# Patient Record
Sex: MALE | Race: ASIAN | Employment: OTHER | URBAN - METROPOLITAN AREA
[De-identification: names, ages, dates, MRNs, and addresses within clinical notes are randomized per-mention and may not be internally consistent; named-entity substitution may affect disease eponyms.]

---

## 2017-05-19 ENCOUNTER — OFFICE VISIT (OUTPATIENT)
Dept: VASCULAR SURGERY | Age: 77
End: 2017-05-19

## 2017-05-19 ENCOUNTER — PROCEDURE VISIT (OUTPATIENT)
Dept: VASCULAR SURGERY | Age: 77
End: 2017-05-19

## 2017-05-19 VITALS
WEIGHT: 172 LBS | DIASTOLIC BLOOD PRESSURE: 60 MMHG | HEIGHT: 65 IN | BODY MASS INDEX: 28.66 KG/M2 | SYSTOLIC BLOOD PRESSURE: 112 MMHG

## 2017-05-19 DIAGNOSIS — Z48.812 POSTOP CAROTID ENDARTERECTOMY SURVEILLANCE, ENCOUNTER FOR: Primary | ICD-10-CM

## 2017-05-19 DIAGNOSIS — Z48.812 ENCOUNTER FOR POSTOPERATIVE CAROTID ENDARTERECTOMY SURVEILLANCE: Primary | ICD-10-CM

## 2017-05-19 PROCEDURE — G8598 ASA/ANTIPLAT THER USED: HCPCS | Performed by: SURGERY

## 2017-05-19 PROCEDURE — 1123F ACP DISCUSS/DSCN MKR DOCD: CPT | Performed by: SURGERY

## 2017-05-19 PROCEDURE — 93880 EXTRACRANIAL BILAT STUDY: CPT | Performed by: SURGERY

## 2017-05-19 PROCEDURE — 4040F PNEUMOC VAC/ADMIN/RCVD: CPT | Performed by: SURGERY

## 2017-05-19 PROCEDURE — G8427 DOCREV CUR MEDS BY ELIG CLIN: HCPCS | Performed by: SURGERY

## 2017-05-19 PROCEDURE — 1036F TOBACCO NON-USER: CPT | Performed by: SURGERY

## 2017-05-19 PROCEDURE — G8420 CALC BMI NORM PARAMETERS: HCPCS | Performed by: SURGERY

## 2017-05-19 PROCEDURE — 99214 OFFICE O/P EST MOD 30 MIN: CPT | Performed by: SURGERY

## 2017-05-19 RX ORDER — METHOCARBAMOL 500 MG/1
500 TABLET, FILM COATED ORAL 4 TIMES DAILY
COMMUNITY
End: 2017-08-17 | Stop reason: ALTCHOICE

## 2017-05-19 RX ORDER — DARIFENACIN HYDROBROMIDE 7.5 MG/1
7.5 TABLET, EXTENDED RELEASE ORAL DAILY
COMMUNITY
End: 2017-08-24 | Stop reason: CLARIF

## 2017-05-19 RX ORDER — GLIMEPIRIDE 4 MG/1
4 TABLET ORAL
COMMUNITY
End: 2018-06-08 | Stop reason: ALTCHOICE

## 2017-08-17 ENCOUNTER — OFFICE VISIT (OUTPATIENT)
Dept: CARDIOLOGY CLINIC | Age: 77
End: 2017-08-17

## 2017-08-17 VITALS
HEART RATE: 81 BPM | WEIGHT: 173 LBS | OXYGEN SATURATION: 94 % | HEIGHT: 65 IN | DIASTOLIC BLOOD PRESSURE: 60 MMHG | BODY MASS INDEX: 28.82 KG/M2 | SYSTOLIC BLOOD PRESSURE: 130 MMHG

## 2017-08-17 DIAGNOSIS — R06.02 SOB (SHORTNESS OF BREATH): ICD-10-CM

## 2017-08-17 DIAGNOSIS — Z98.61 CAD S/P PERCUTANEOUS CORONARY ANGIOPLASTY: Primary | ICD-10-CM

## 2017-08-17 DIAGNOSIS — I25.10 CAD S/P PERCUTANEOUS CORONARY ANGIOPLASTY: Primary | ICD-10-CM

## 2017-08-17 PROCEDURE — G8598 ASA/ANTIPLAT THER USED: HCPCS | Performed by: INTERNAL MEDICINE

## 2017-08-17 PROCEDURE — 99215 OFFICE O/P EST HI 40 MIN: CPT | Performed by: INTERNAL MEDICINE

## 2017-08-17 PROCEDURE — G8427 DOCREV CUR MEDS BY ELIG CLIN: HCPCS | Performed by: INTERNAL MEDICINE

## 2017-08-17 PROCEDURE — 1036F TOBACCO NON-USER: CPT | Performed by: INTERNAL MEDICINE

## 2017-08-17 PROCEDURE — 1123F ACP DISCUSS/DSCN MKR DOCD: CPT | Performed by: INTERNAL MEDICINE

## 2017-08-17 PROCEDURE — 4040F PNEUMOC VAC/ADMIN/RCVD: CPT | Performed by: INTERNAL MEDICINE

## 2017-08-17 PROCEDURE — G8419 CALC BMI OUT NRM PARAM NOF/U: HCPCS | Performed by: INTERNAL MEDICINE

## 2017-08-17 RX ORDER — TOLTERODINE TARTRATE 2 MG/1
2 TABLET, EXTENDED RELEASE ORAL 2 TIMES DAILY
COMMUNITY

## 2017-08-24 ENCOUNTER — OFFICE VISIT (OUTPATIENT)
Dept: CARDIOLOGY CLINIC | Age: 77
End: 2017-08-24

## 2017-08-24 ENCOUNTER — HOSPITAL ENCOUNTER (OUTPATIENT)
Dept: OTHER | Age: 77
Discharge: HOME | End: 2017-08-24
Attending: INTERNAL MEDICINE | Admitting: INTERNAL MEDICINE

## 2017-08-24 VITALS
WEIGHT: 173 LBS | SYSTOLIC BLOOD PRESSURE: 132 MMHG | OXYGEN SATURATION: 95 % | DIASTOLIC BLOOD PRESSURE: 60 MMHG | HEART RATE: 80 BPM | BODY MASS INDEX: 28.82 KG/M2 | HEIGHT: 65 IN

## 2017-08-24 DIAGNOSIS — I65.23 BILATERAL CAROTID ARTERY STENOSIS: ICD-10-CM

## 2017-08-24 DIAGNOSIS — I10 ESSENTIAL HYPERTENSION: ICD-10-CM

## 2017-08-24 DIAGNOSIS — E78.5 HYPERLIPIDEMIA, UNSPECIFIED HYPERLIPIDEMIA TYPE: Primary | ICD-10-CM

## 2017-08-24 DIAGNOSIS — I50.31 CHF (CONGESTIVE HEART FAILURE), NYHA CLASS III, ACUTE, DIASTOLIC (HCC): ICD-10-CM

## 2017-08-24 DIAGNOSIS — R06.02 SOB (SHORTNESS OF BREATH): ICD-10-CM

## 2017-08-24 DIAGNOSIS — Z98.61 CAD S/P PERCUTANEOUS CORONARY ANGIOPLASTY: ICD-10-CM

## 2017-08-24 DIAGNOSIS — I25.10 CAD S/P PERCUTANEOUS CORONARY ANGIOPLASTY: ICD-10-CM

## 2017-08-24 LAB — PRO-BNP: 630 PG/ML (ref 0–449)

## 2017-08-24 PROCEDURE — G8427 DOCREV CUR MEDS BY ELIG CLIN: HCPCS | Performed by: NURSE PRACTITIONER

## 2017-08-24 PROCEDURE — G8598 ASA/ANTIPLAT THER USED: HCPCS | Performed by: NURSE PRACTITIONER

## 2017-08-24 PROCEDURE — 1123F ACP DISCUSS/DSCN MKR DOCD: CPT | Performed by: NURSE PRACTITIONER

## 2017-08-24 PROCEDURE — 4040F PNEUMOC VAC/ADMIN/RCVD: CPT | Performed by: NURSE PRACTITIONER

## 2017-08-24 PROCEDURE — 1036F TOBACCO NON-USER: CPT | Performed by: NURSE PRACTITIONER

## 2017-08-24 PROCEDURE — G8419 CALC BMI OUT NRM PARAM NOF/U: HCPCS | Performed by: NURSE PRACTITIONER

## 2017-08-24 PROCEDURE — 99214 OFFICE O/P EST MOD 30 MIN: CPT | Performed by: NURSE PRACTITIONER

## 2017-08-24 PROCEDURE — 1111F DSCHRG MED/CURRENT MED MERGE: CPT | Performed by: NURSE PRACTITIONER

## 2017-08-24 RX ORDER — LOSARTAN POTASSIUM AND HYDROCHLOROTHIAZIDE 25; 100 MG/1; MG/1
0.5 TABLET ORAL DAILY
Qty: 30 TABLET | Refills: 5 | Status: SHIPPED | OUTPATIENT
Start: 2017-08-24 | End: 2017-08-24 | Stop reason: DRUGHIGH

## 2017-08-24 RX ORDER — ATORVASTATIN CALCIUM 40 MG/1
40 TABLET, FILM COATED ORAL DAILY
Qty: 30 TABLET | Refills: 11 | Status: SHIPPED | OUTPATIENT
Start: 2017-08-24 | End: 2018-10-16 | Stop reason: SDUPTHER

## 2017-08-24 RX ORDER — ATORVASTATIN CALCIUM 40 MG/1
40 TABLET, FILM COATED ORAL DAILY
Qty: 30 TABLET | Refills: 6 | Status: SHIPPED | OUTPATIENT
Start: 2017-08-24 | End: 2017-08-24 | Stop reason: SDUPTHER

## 2017-08-24 RX ORDER — LOSARTAN POTASSIUM AND HYDROCHLOROTHIAZIDE 12.5; 1 MG/1; MG/1
1 TABLET ORAL DAILY
Qty: 30 TABLET | Refills: 3
Start: 2017-08-24 | End: 2017-08-25 | Stop reason: DRUGHIGH

## 2017-08-25 RX ORDER — FUROSEMIDE 20 MG/1
20 TABLET ORAL DAILY
Qty: 30 TABLET | Refills: 5 | Status: SHIPPED | OUTPATIENT
Start: 2017-08-25 | End: 2018-04-18 | Stop reason: SDUPTHER

## 2017-08-25 RX ORDER — LOSARTAN POTASSIUM 100 MG/1
100 TABLET ORAL DAILY
Qty: 90 TABLET | Refills: 3 | Status: SHIPPED | OUTPATIENT
Start: 2017-08-25 | End: 2018-06-08 | Stop reason: CLARIF

## 2017-08-25 RX ORDER — LOSARTAN POTASSIUM 100 MG/1
100 TABLET ORAL DAILY
Qty: 30 TABLET | Refills: 3 | Status: SHIPPED | OUTPATIENT
Start: 2017-08-25 | End: 2017-08-25 | Stop reason: SDUPTHER

## 2017-08-29 ENCOUNTER — TELEPHONE (OUTPATIENT)
Dept: CARDIOLOGY CLINIC | Age: 77
End: 2017-08-29

## 2017-12-21 ENCOUNTER — PROCEDURE VISIT (OUTPATIENT)
Dept: VASCULAR SURGERY | Age: 77
End: 2017-12-21

## 2017-12-21 DIAGNOSIS — Z48.812 ENCOUNTER FOR POSTOPERATIVE CAROTID ENDARTERECTOMY SURVEILLANCE: Primary | ICD-10-CM

## 2018-04-18 ENCOUNTER — OFFICE VISIT (OUTPATIENT)
Dept: CARDIOLOGY CLINIC | Age: 78
End: 2018-04-18

## 2018-04-18 VITALS
SYSTOLIC BLOOD PRESSURE: 114 MMHG | DIASTOLIC BLOOD PRESSURE: 86 MMHG | HEIGHT: 65 IN | OXYGEN SATURATION: 95 % | BODY MASS INDEX: 27.99 KG/M2 | WEIGHT: 168 LBS | HEART RATE: 77 BPM

## 2018-04-18 DIAGNOSIS — Z98.61 CAD S/P PERCUTANEOUS CORONARY ANGIOPLASTY: ICD-10-CM

## 2018-04-18 DIAGNOSIS — I10 ESSENTIAL HYPERTENSION: Primary | ICD-10-CM

## 2018-04-18 DIAGNOSIS — I25.10 CAD S/P PERCUTANEOUS CORONARY ANGIOPLASTY: ICD-10-CM

## 2018-04-18 DIAGNOSIS — R06.02 SOB (SHORTNESS OF BREATH): ICD-10-CM

## 2018-04-18 PROCEDURE — 99214 OFFICE O/P EST MOD 30 MIN: CPT | Performed by: INTERNAL MEDICINE

## 2018-04-18 PROCEDURE — G8427 DOCREV CUR MEDS BY ELIG CLIN: HCPCS | Performed by: INTERNAL MEDICINE

## 2018-04-18 PROCEDURE — 1123F ACP DISCUSS/DSCN MKR DOCD: CPT | Performed by: INTERNAL MEDICINE

## 2018-04-18 PROCEDURE — G8419 CALC BMI OUT NRM PARAM NOF/U: HCPCS | Performed by: INTERNAL MEDICINE

## 2018-04-18 PROCEDURE — 4040F PNEUMOC VAC/ADMIN/RCVD: CPT | Performed by: INTERNAL MEDICINE

## 2018-04-18 PROCEDURE — G8598 ASA/ANTIPLAT THER USED: HCPCS | Performed by: INTERNAL MEDICINE

## 2018-04-18 PROCEDURE — 1036F TOBACCO NON-USER: CPT | Performed by: INTERNAL MEDICINE

## 2018-04-18 RX ORDER — CANAGLIFLOZIN 100 MG/1
TABLET, FILM COATED ORAL
COMMUNITY
Start: 2018-04-03 | End: 2021-08-19

## 2018-04-18 RX ORDER — POTASSIUM CHLORIDE 20 MEQ/1
20 TABLET, EXTENDED RELEASE ORAL DAILY
Qty: 30 TABLET | Refills: 11 | Status: ON HOLD | OUTPATIENT
Start: 2018-04-18 | End: 2018-06-01 | Stop reason: HOSPADM

## 2018-04-18 RX ORDER — FUROSEMIDE 20 MG/1
20 TABLET ORAL DAILY
Qty: 30 TABLET | Refills: 6 | Status: SHIPPED | OUTPATIENT
Start: 2018-04-18 | End: 2018-05-10

## 2018-04-23 ENCOUNTER — HOSPITAL ENCOUNTER (OUTPATIENT)
Dept: OTHER | Age: 78
Discharge: HOME | End: 2018-04-23
Attending: INTERNAL MEDICINE | Admitting: INTERNAL MEDICINE

## 2018-04-23 DIAGNOSIS — I10 ESSENTIAL HYPERTENSION: ICD-10-CM

## 2018-04-23 DIAGNOSIS — R06.02 SOB (SHORTNESS OF BREATH): ICD-10-CM

## 2018-04-24 ENCOUNTER — TELEPHONE (OUTPATIENT)
Dept: CARDIOLOGY CLINIC | Age: 78
End: 2018-04-24

## 2018-04-24 LAB
ANION GAP SERPL CALCULATED.3IONS-SCNC: 16 MMOL/L (ref 3–16)
BUN BLDV-MCNC: 23 MG/DL (ref 7–20)
CALCIUM SERPL-MCNC: 9.4 MG/DL (ref 8.3–10.6)
CHLORIDE BLD-SCNC: 99 MMOL/L (ref 99–110)
CO2: 27 MMOL/L (ref 21–32)
CREAT SERPL-MCNC: 0.8 MG/DL (ref 0.8–1.3)
GFR AFRICAN AMERICAN: >60
GFR NON-AFRICAN AMERICAN: >60
GLUCOSE BLD-MCNC: 317 MG/DL (ref 70–99)
POTASSIUM SERPL-SCNC: 5 MMOL/L (ref 3.5–5.1)
PRO-BNP: 542 PG/ML (ref 0–449)
SODIUM BLD-SCNC: 142 MMOL/L (ref 136–145)

## 2018-05-09 ENCOUNTER — OFFICE VISIT (OUTPATIENT)
Dept: CARDIOLOGY CLINIC | Age: 78
End: 2018-05-09

## 2018-05-09 ENCOUNTER — HOSPITAL ENCOUNTER (OUTPATIENT)
Dept: OTHER | Age: 78
Discharge: HOME | End: 2018-05-09
Attending: INTERNAL MEDICINE | Admitting: INTERNAL MEDICINE

## 2018-05-09 VITALS
HEART RATE: 92 BPM | OXYGEN SATURATION: 94 % | HEIGHT: 65 IN | BODY MASS INDEX: 28.16 KG/M2 | WEIGHT: 169 LBS | DIASTOLIC BLOOD PRESSURE: 60 MMHG | SYSTOLIC BLOOD PRESSURE: 110 MMHG

## 2018-05-09 DIAGNOSIS — I50.31 ACUTE DIASTOLIC CONGESTIVE HEART FAILURE, NYHA CLASS 3 (HCC): ICD-10-CM

## 2018-05-09 DIAGNOSIS — E78.5 HYPERLIPIDEMIA, UNSPECIFIED HYPERLIPIDEMIA TYPE: ICD-10-CM

## 2018-05-09 DIAGNOSIS — R06.02 SOB (SHORTNESS OF BREATH): Primary | ICD-10-CM

## 2018-05-09 DIAGNOSIS — I10 ESSENTIAL HYPERTENSION: ICD-10-CM

## 2018-05-09 DIAGNOSIS — R06.02 SOB (SHORTNESS OF BREATH): ICD-10-CM

## 2018-05-09 LAB
ANION GAP SERPL CALCULATED.3IONS-SCNC: 14 MMOL/L (ref 3–16)
BUN BLDV-MCNC: 23 MG/DL (ref 7–20)
CALCIUM SERPL-MCNC: 9.2 MG/DL (ref 8.3–10.6)
CHLORIDE BLD-SCNC: 97 MMOL/L (ref 99–110)
CO2: 26 MMOL/L (ref 21–32)
CREAT SERPL-MCNC: 0.9 MG/DL (ref 0.8–1.3)
GFR AFRICAN AMERICAN: >60
GFR NON-AFRICAN AMERICAN: >60
GLUCOSE BLD-MCNC: 277 MG/DL (ref 70–99)
POTASSIUM SERPL-SCNC: 4.6 MMOL/L (ref 3.5–5.1)
PRO-BNP: 584 PG/ML (ref 0–449)
SODIUM BLD-SCNC: 137 MMOL/L (ref 136–145)

## 2018-05-09 PROCEDURE — 4040F PNEUMOC VAC/ADMIN/RCVD: CPT | Performed by: INTERNAL MEDICINE

## 2018-05-09 PROCEDURE — 99214 OFFICE O/P EST MOD 30 MIN: CPT | Performed by: INTERNAL MEDICINE

## 2018-05-09 PROCEDURE — G8419 CALC BMI OUT NRM PARAM NOF/U: HCPCS | Performed by: INTERNAL MEDICINE

## 2018-05-09 PROCEDURE — G8427 DOCREV CUR MEDS BY ELIG CLIN: HCPCS | Performed by: INTERNAL MEDICINE

## 2018-05-09 PROCEDURE — 1123F ACP DISCUSS/DSCN MKR DOCD: CPT | Performed by: INTERNAL MEDICINE

## 2018-05-09 PROCEDURE — G8598 ASA/ANTIPLAT THER USED: HCPCS | Performed by: INTERNAL MEDICINE

## 2018-05-09 PROCEDURE — 1036F TOBACCO NON-USER: CPT | Performed by: INTERNAL MEDICINE

## 2018-05-10 ENCOUNTER — TELEPHONE (OUTPATIENT)
Dept: CARDIOLOGY CLINIC | Age: 78
End: 2018-05-10

## 2018-05-10 RX ORDER — TORSEMIDE 100 MG/1
50 TABLET ORAL DAILY
Qty: 30 TABLET | Refills: 3 | Status: ON HOLD | OUTPATIENT
Start: 2018-05-10 | End: 2018-06-01

## 2018-05-15 ENCOUNTER — HOSPITAL ENCOUNTER (OUTPATIENT)
Dept: NON INVASIVE DIAGNOSTICS | Age: 78
Discharge: HOME | End: 2018-05-15
Attending: INTERNAL MEDICINE | Admitting: INTERNAL MEDICINE

## 2018-05-15 DIAGNOSIS — R06.02 SOB (SHORTNESS OF BREATH): ICD-10-CM

## 2018-05-15 DIAGNOSIS — R06.02 SHORTNESS OF BREATH: ICD-10-CM

## 2018-05-15 LAB
LV EF: 48 %
LVEF MODALITY: NORMAL

## 2018-05-16 ENCOUNTER — TELEPHONE (OUTPATIENT)
Dept: CARDIOLOGY CLINIC | Age: 78
End: 2018-05-16

## 2018-05-22 ENCOUNTER — OFFICE VISIT (OUTPATIENT)
Dept: CARDIOLOGY CLINIC | Age: 78
End: 2018-05-22

## 2018-05-22 VITALS
BODY MASS INDEX: 27.49 KG/M2 | DIASTOLIC BLOOD PRESSURE: 50 MMHG | HEIGHT: 65 IN | OXYGEN SATURATION: 94 % | HEART RATE: 70 BPM | SYSTOLIC BLOOD PRESSURE: 96 MMHG | WEIGHT: 165 LBS

## 2018-05-22 DIAGNOSIS — I65.23 BILATERAL CAROTID ARTERY STENOSIS: ICD-10-CM

## 2018-05-22 DIAGNOSIS — I10 ESSENTIAL HYPERTENSION: ICD-10-CM

## 2018-05-22 DIAGNOSIS — I25.10 CAD S/P PERCUTANEOUS CORONARY ANGIOPLASTY: Primary | ICD-10-CM

## 2018-05-22 DIAGNOSIS — I50.31 ACUTE DIASTOLIC CONGESTIVE HEART FAILURE, NYHA CLASS 3 (HCC): ICD-10-CM

## 2018-05-22 DIAGNOSIS — R06.02 SOB (SHORTNESS OF BREATH): ICD-10-CM

## 2018-05-22 DIAGNOSIS — E78.5 HYPERLIPIDEMIA, UNSPECIFIED HYPERLIPIDEMIA TYPE: ICD-10-CM

## 2018-05-22 DIAGNOSIS — Z98.61 CAD S/P PERCUTANEOUS CORONARY ANGIOPLASTY: Primary | ICD-10-CM

## 2018-05-22 PROCEDURE — G8427 DOCREV CUR MEDS BY ELIG CLIN: HCPCS | Performed by: INTERNAL MEDICINE

## 2018-05-22 PROCEDURE — G8419 CALC BMI OUT NRM PARAM NOF/U: HCPCS | Performed by: INTERNAL MEDICINE

## 2018-05-22 PROCEDURE — G8598 ASA/ANTIPLAT THER USED: HCPCS | Performed by: INTERNAL MEDICINE

## 2018-05-22 PROCEDURE — 1123F ACP DISCUSS/DSCN MKR DOCD: CPT | Performed by: INTERNAL MEDICINE

## 2018-05-22 PROCEDURE — 1036F TOBACCO NON-USER: CPT | Performed by: INTERNAL MEDICINE

## 2018-05-22 PROCEDURE — 99214 OFFICE O/P EST MOD 30 MIN: CPT | Performed by: INTERNAL MEDICINE

## 2018-05-22 PROCEDURE — 4040F PNEUMOC VAC/ADMIN/RCVD: CPT | Performed by: INTERNAL MEDICINE

## 2018-05-31 PROBLEM — I25.10 CAD IN NATIVE ARTERY: Status: ACTIVE | Noted: 2018-05-31

## 2018-06-01 PROBLEM — I50.32 CHRONIC DIASTOLIC CONGESTIVE HEART FAILURE (HCC): Status: ACTIVE | Noted: 2018-06-01

## 2018-06-08 ENCOUNTER — TELEPHONE (OUTPATIENT)
Dept: CARDIOLOGY CLINIC | Age: 78
End: 2018-06-08

## 2018-06-08 RX ORDER — CARVEDILOL 6.25 MG/1
6.25 TABLET ORAL 2 TIMES DAILY WITH MEALS
COMMUNITY
End: 2018-06-14 | Stop reason: SDUPTHER

## 2018-06-08 RX ORDER — LOSARTAN POTASSIUM 50 MG/1
50 TABLET ORAL DAILY
COMMUNITY
End: 2018-06-14 | Stop reason: SDUPTHER

## 2018-06-14 ENCOUNTER — OFFICE VISIT (OUTPATIENT)
Dept: CARDIOLOGY CLINIC | Age: 78
End: 2018-06-14

## 2018-06-14 ENCOUNTER — HOSPITAL ENCOUNTER (OUTPATIENT)
Dept: OTHER | Age: 78
Discharge: HOME | End: 2018-06-14
Attending: NURSE PRACTITIONER | Admitting: NURSE PRACTITIONER

## 2018-06-14 VITALS
BODY MASS INDEX: 27.94 KG/M2 | SYSTOLIC BLOOD PRESSURE: 126 MMHG | WEIGHT: 167.7 LBS | HEIGHT: 65 IN | HEART RATE: 79 BPM | DIASTOLIC BLOOD PRESSURE: 68 MMHG

## 2018-06-14 DIAGNOSIS — I65.23 BILATERAL CAROTID ARTERY STENOSIS: ICD-10-CM

## 2018-06-14 DIAGNOSIS — Z98.61 CAD S/P PERCUTANEOUS CORONARY ANGIOPLASTY: ICD-10-CM

## 2018-06-14 DIAGNOSIS — I50.32 CHRONIC DIASTOLIC CONGESTIVE HEART FAILURE (HCC): Primary | ICD-10-CM

## 2018-06-14 DIAGNOSIS — I25.10 CAD S/P PERCUTANEOUS CORONARY ANGIOPLASTY: ICD-10-CM

## 2018-06-14 DIAGNOSIS — E78.2 MIXED HYPERLIPIDEMIA: ICD-10-CM

## 2018-06-14 DIAGNOSIS — I10 ESSENTIAL HYPERTENSION: ICD-10-CM

## 2018-06-14 LAB
ANION GAP SERPL CALCULATED.3IONS-SCNC: 12 MMOL/L (ref 3–16)
BUN BLDV-MCNC: 27 MG/DL (ref 7–20)
CALCIUM SERPL-MCNC: 10.1 MG/DL (ref 8.3–10.6)
CHLORIDE BLD-SCNC: 95 MMOL/L (ref 99–110)
CO2: 31 MMOL/L (ref 21–32)
CREAT SERPL-MCNC: 0.9 MG/DL (ref 0.8–1.3)
GFR AFRICAN AMERICAN: >60
GFR NON-AFRICAN AMERICAN: >60
GLUCOSE BLD-MCNC: 241 MG/DL (ref 70–99)
POTASSIUM SERPL-SCNC: 4.8 MMOL/L (ref 3.5–5.1)
SODIUM BLD-SCNC: 138 MMOL/L (ref 136–145)

## 2018-06-14 PROCEDURE — G8598 ASA/ANTIPLAT THER USED: HCPCS | Performed by: NURSE PRACTITIONER

## 2018-06-14 PROCEDURE — G8419 CALC BMI OUT NRM PARAM NOF/U: HCPCS | Performed by: NURSE PRACTITIONER

## 2018-06-14 PROCEDURE — 4040F PNEUMOC VAC/ADMIN/RCVD: CPT | Performed by: NURSE PRACTITIONER

## 2018-06-14 PROCEDURE — G8427 DOCREV CUR MEDS BY ELIG CLIN: HCPCS | Performed by: NURSE PRACTITIONER

## 2018-06-14 PROCEDURE — 1123F ACP DISCUSS/DSCN MKR DOCD: CPT | Performed by: NURSE PRACTITIONER

## 2018-06-14 PROCEDURE — 1036F TOBACCO NON-USER: CPT | Performed by: NURSE PRACTITIONER

## 2018-06-14 PROCEDURE — 99214 OFFICE O/P EST MOD 30 MIN: CPT | Performed by: NURSE PRACTITIONER

## 2018-06-14 RX ORDER — TORSEMIDE 10 MG/1
10 TABLET ORAL DAILY
Qty: 30 TABLET | Refills: 3 | Status: SHIPPED | OUTPATIENT
Start: 2018-06-14 | End: 2018-06-27 | Stop reason: SDUPTHER

## 2018-06-14 RX ORDER — CARVEDILOL 3.12 MG/1
3.12 TABLET ORAL 2 TIMES DAILY WITH MEALS
Qty: 30 TABLET | Refills: 3 | Status: SHIPPED | OUTPATIENT
Start: 2018-06-14 | End: 2018-07-27 | Stop reason: SDUPTHER

## 2018-06-14 RX ORDER — LOSARTAN POTASSIUM 25 MG/1
25 TABLET ORAL DAILY
Qty: 30 TABLET | Refills: 3 | Status: SHIPPED | OUTPATIENT
Start: 2018-06-14 | End: 2018-06-27 | Stop reason: SDUPTHER

## 2018-06-27 ENCOUNTER — NURSE ONLY (OUTPATIENT)
Dept: CARDIOLOGY CLINIC | Age: 78
End: 2018-06-27

## 2018-06-27 VITALS — SYSTOLIC BLOOD PRESSURE: 112 MMHG | DIASTOLIC BLOOD PRESSURE: 70 MMHG

## 2018-06-27 RX ORDER — TORSEMIDE 10 MG/1
10 TABLET ORAL PRN
Qty: 30 TABLET | Refills: 3
Start: 2018-06-27 | End: 2019-02-07 | Stop reason: SDUPTHER

## 2018-06-27 RX ORDER — LOSARTAN POTASSIUM 25 MG/1
12.5 TABLET ORAL DAILY
Qty: 30 TABLET | Refills: 3
Start: 2018-06-27 | End: 2018-10-16 | Stop reason: SDUPTHER

## 2018-07-17 ENCOUNTER — OFFICE VISIT (OUTPATIENT)
Dept: CARDIOLOGY CLINIC | Age: 78
End: 2018-07-17

## 2018-07-17 VITALS
SYSTOLIC BLOOD PRESSURE: 136 MMHG | DIASTOLIC BLOOD PRESSURE: 82 MMHG | WEIGHT: 167 LBS | BODY MASS INDEX: 27.79 KG/M2 | HEART RATE: 97 BPM | OXYGEN SATURATION: 94 %

## 2018-07-17 DIAGNOSIS — I10 ESSENTIAL HYPERTENSION: ICD-10-CM

## 2018-07-17 DIAGNOSIS — Z98.61 CAD S/P PERCUTANEOUS CORONARY ANGIOPLASTY: Primary | ICD-10-CM

## 2018-07-17 DIAGNOSIS — I50.31 ACUTE DIASTOLIC CONGESTIVE HEART FAILURE, NYHA CLASS 3 (HCC): ICD-10-CM

## 2018-07-17 DIAGNOSIS — I65.23 BILATERAL CAROTID ARTERY STENOSIS: ICD-10-CM

## 2018-07-17 DIAGNOSIS — I25.10 CAD S/P PERCUTANEOUS CORONARY ANGIOPLASTY: Primary | ICD-10-CM

## 2018-07-17 DIAGNOSIS — E78.2 MIXED HYPERLIPIDEMIA: ICD-10-CM

## 2018-07-17 PROCEDURE — 1101F PT FALLS ASSESS-DOCD LE1/YR: CPT | Performed by: NURSE PRACTITIONER

## 2018-07-17 PROCEDURE — 4040F PNEUMOC VAC/ADMIN/RCVD: CPT | Performed by: NURSE PRACTITIONER

## 2018-07-17 PROCEDURE — G8598 ASA/ANTIPLAT THER USED: HCPCS | Performed by: NURSE PRACTITIONER

## 2018-07-17 PROCEDURE — 1036F TOBACCO NON-USER: CPT | Performed by: NURSE PRACTITIONER

## 2018-07-17 PROCEDURE — G8419 CALC BMI OUT NRM PARAM NOF/U: HCPCS | Performed by: NURSE PRACTITIONER

## 2018-07-17 PROCEDURE — G8427 DOCREV CUR MEDS BY ELIG CLIN: HCPCS | Performed by: NURSE PRACTITIONER

## 2018-07-17 PROCEDURE — 1123F ACP DISCUSS/DSCN MKR DOCD: CPT | Performed by: NURSE PRACTITIONER

## 2018-07-17 PROCEDURE — 99214 OFFICE O/P EST MOD 30 MIN: CPT | Performed by: NURSE PRACTITIONER

## 2018-07-17 NOTE — PROGRESS NOTES
minutes as needed for Chest pain (No more than 3 tablets in a 15 minute period. ). 25 tablet 2    glucose blood VI test strips (ASCENSIA AUTODISC VI;ONE TOUCH ULTRA TEST VI) strip 1 each by Does not apply route daily. As needed.  clopidogrel (PLAVIX) 75 MG tablet Take 1 tablet by mouth daily. 30 tablet 11    metformin (GLUCOPHAGE) 1000 MG tablet Take 1 tablet by mouth 2 times daily (with meals). 90 tablet 3    aspirin 81 MG EC tablet Take 81 mg by mouth daily.  ferrous sulfate 325 (65 FE) MG tablet Take 325 mg by mouth 2 times daily.  gabapentin (NEURONTIN) 600 MG tablet Take 600 mg by mouth 3 times daily.  therapeutic multivitamin-minerals (THERAGRAN-M) tablet Take 2 tablets by mouth daily. No current facility-administered medications for this visit. REVIEW OF SYSTEMS:   CONSTITUTIONAL: No major weight gain or loss, fatigue, weakness, night sweats or fever. There's been no change in energy level, sleep pattern, or activity level. HEENT: No new vision difficulties or ringing in the ears. RESPIRATORY: No new SOB, PND, orthopnea or cough. CARDIOVASCULAR: See HPI  GI: No nausea, vomiting, diarrhea, constipation, abdominal pain or changes in bowel habits. : No urinary frequency, urgency, incontinence hematuria or dysuria. SKIN: No cyanosis or skin lesions. MUSCULOSKELETAL: No new muscle or joint pain. NEUROLOGICAL: No syncope or TIA-like symptoms. PSYCHIATRIC: No anxiety, pain, insomnia or depression    Objective:   PHYSICAL EXAM:        VITALS:    Vitals:    07/17/18 1115   BP: 136/82   Pulse: 97   SpO2: 94%           CONSTITUTIONAL: Cooperative, no apparent distress, and appears well nourished / developed  NEUROLOGIC:  Awake and orientated to person, place and time. PSYCH: Calm affect. SKIN: Warm and dry. HEENT: Sclera non-icteric, normocephalic, neck supple, no elevation of JVP, normal carotid pulses with no bruits and thyroid normal size.   LUNGS:  Clear

## 2018-07-26 RX ORDER — CARVEDILOL 3.12 MG/1
TABLET ORAL
Qty: 60 TABLET | Refills: 2 | OUTPATIENT
Start: 2018-07-26

## 2018-07-27 RX ORDER — CARVEDILOL 3.12 MG/1
3.12 TABLET ORAL 2 TIMES DAILY WITH MEALS
Qty: 60 TABLET | Refills: 5 | Status: SHIPPED | OUTPATIENT
Start: 2018-07-27 | End: 2018-10-16 | Stop reason: SDUPTHER

## 2018-09-14 RX ORDER — LOSARTAN POTASSIUM 25 MG/1
TABLET ORAL
Qty: 30 TABLET | Refills: 2 | Status: SHIPPED | OUTPATIENT
Start: 2018-09-14 | End: 2018-10-16 | Stop reason: SDUPTHER

## 2018-10-15 NOTE — PROGRESS NOTES
[address]     Outpatient Follow Up Note    CHIEF COMPLAINT / HPI: Follow Up secondary to coronary artery disease/ HTN/ Hyperlipidemia/ CHF       Jame Gibbs is 66 y.o. male who presents today for a routine follow up  related to the above mentioned issues. Subjective:   Since the time of last office visit, the patient admits their symptoms have improved. Jame Gibbs is being seen secondary to HTN/ Hyperlipidemia/ and CAD. He was admitted on 5/31/2018 and had an elective RHC, LHC, and PCI of the cx/OM1. Rhythm has been sinus with an intermittent LBBB. At today's visit patient is doing well. He denies any chest pain, SOB, palpitations, dizziness, or edema. With regard to medication therapy the patient has been compliant with prescribed regimen. They have tolerated therapy to date. Only complaint is lower, left back pain starting three months ago. Describes pain as aching discomfort upon waking, improves with ambulation and worsens with laying and sitting. Pain rated 3/10, Not currently taking any medication to improve pain. Remains able to walk daily and complete ADLs.  Visit translated through  services    Past Medical History:   Diagnosis Date    [facility]     CAD (coronary artery disease)     CHF (congestive heart failure), NYHA class III ([facility]     Diabetes mellitus ([facility]     HTN (hypertension) 10/26/2011    Hyperlipidemia 10/26/2011     Social History:    History   Smoking Status    Never Smoker   Smokeless Tobacco    Never Used     Current Medications:  Current Outpatient Prescriptions   Medication Sig Dispense Refill    losartan (COZAAR) 25 MG tablet TAKE ONE TABLET BY MOUTH DAILY 30 tablet 2    carvedilol (COREG) 3.125 MG tablet Take 1 tablet by mouth 2 times daily (with meals) 60 tablet 5    losartan (COZAAR) 25 MG tablet Take 0.5 tablets by mouth daily 30 tablet 3    torsemide (DEMADEX) 10 MG tablet Take 1 tablet by mouth as needed (daily prn) 30 tablet

## 2018-10-16 ENCOUNTER — OFFICE VISIT (OUTPATIENT)
Dept: CARDIOLOGY CLINIC | Age: 78
End: 2018-10-16
Payer: COMMERCIAL

## 2018-10-16 VITALS
DIASTOLIC BLOOD PRESSURE: 80 MMHG | BODY MASS INDEX: 29.37 KG/M2 | SYSTOLIC BLOOD PRESSURE: 132 MMHG | HEART RATE: 90 BPM | HEIGHT: 64 IN | WEIGHT: 172 LBS | OXYGEN SATURATION: 94 %

## 2018-10-16 DIAGNOSIS — I65.23 BILATERAL CAROTID ARTERY STENOSIS: ICD-10-CM

## 2018-10-16 DIAGNOSIS — I50.32 CHRONIC DIASTOLIC CONGESTIVE HEART FAILURE (HCC): ICD-10-CM

## 2018-10-16 DIAGNOSIS — E78.5 HYPERLIPIDEMIA, UNSPECIFIED HYPERLIPIDEMIA TYPE: ICD-10-CM

## 2018-10-16 DIAGNOSIS — I25.10 CAD IN NATIVE ARTERY: ICD-10-CM

## 2018-10-16 DIAGNOSIS — I10 ESSENTIAL HYPERTENSION: Primary | ICD-10-CM

## 2018-10-16 PROCEDURE — 99214 OFFICE O/P EST MOD 30 MIN: CPT | Performed by: NURSE PRACTITIONER

## 2018-10-16 RX ORDER — LOSARTAN POTASSIUM 25 MG/1
TABLET ORAL
Qty: 30 TABLET | Refills: 11 | Status: SHIPPED | OUTPATIENT
Start: 2018-10-16 | End: 2019-02-07 | Stop reason: SDUPTHER

## 2018-10-16 RX ORDER — ATORVASTATIN CALCIUM 40 MG/1
40 TABLET, FILM COATED ORAL DAILY
Qty: 30 TABLET | Refills: 11 | Status: SHIPPED | OUTPATIENT
Start: 2018-10-16 | End: 2019-02-07 | Stop reason: SDUPTHER

## 2018-10-16 RX ORDER — CLOPIDOGREL BISULFATE 75 MG/1
75 TABLET ORAL DAILY
Qty: 30 TABLET | Refills: 11 | Status: SHIPPED | OUTPATIENT
Start: 2018-10-16 | End: 2019-02-07 | Stop reason: SDUPTHER

## 2018-10-16 RX ORDER — CARVEDILOL 3.12 MG/1
3.12 TABLET ORAL 2 TIMES DAILY WITH MEALS
Qty: 60 TABLET | Refills: 11 | Status: SHIPPED | OUTPATIENT
Start: 2018-10-16 | End: 2019-02-07 | Stop reason: SDUPTHER

## 2019-01-07 RX ORDER — TORSEMIDE 100 MG/1
TABLET ORAL
Qty: 30 TABLET | Refills: 0 | Status: SHIPPED | OUTPATIENT
Start: 2019-01-07 | End: 2019-02-04 | Stop reason: SDUPTHER

## 2019-02-05 RX ORDER — TORSEMIDE 100 MG/1
100 TABLET ORAL DAILY
Qty: 30 TABLET | Refills: 0 | Status: SHIPPED | OUTPATIENT
Start: 2019-02-05 | End: 2021-05-12 | Stop reason: SDUPTHER

## 2019-02-07 ENCOUNTER — HOSPITAL ENCOUNTER (OUTPATIENT)
Age: 79
Discharge: HOME | End: 2019-02-07
Payer: COMMERCIAL

## 2019-02-07 ENCOUNTER — HOSPITAL ENCOUNTER (OUTPATIENT)
Dept: GENERAL RADIOLOGY | Age: 79
Discharge: HOME | End: 2019-02-07
Payer: COMMERCIAL

## 2019-02-07 ENCOUNTER — OFFICE VISIT (OUTPATIENT)
Dept: CARDIOLOGY CLINIC | Age: 79
End: 2019-02-07
Payer: COMMERCIAL

## 2019-02-07 VITALS
HEART RATE: 80 BPM | OXYGEN SATURATION: 92 % | BODY MASS INDEX: 29.45 KG/M2 | WEIGHT: 172.5 LBS | SYSTOLIC BLOOD PRESSURE: 124 MMHG | HEIGHT: 64 IN | DIASTOLIC BLOOD PRESSURE: 70 MMHG

## 2019-02-07 DIAGNOSIS — I50.32 CHRONIC DIASTOLIC CONGESTIVE HEART FAILURE (HCC): ICD-10-CM

## 2019-02-07 DIAGNOSIS — I25.10 CAD S/P PERCUTANEOUS CORONARY ANGIOPLASTY: Primary | ICD-10-CM

## 2019-02-07 DIAGNOSIS — I10 ESSENTIAL HYPERTENSION: ICD-10-CM

## 2019-02-07 DIAGNOSIS — Z98.61 CAD S/P PERCUTANEOUS CORONARY ANGIOPLASTY: Primary | ICD-10-CM

## 2019-02-07 DIAGNOSIS — E78.2 MIXED HYPERLIPIDEMIA: ICD-10-CM

## 2019-02-07 DIAGNOSIS — E78.5 HYPERLIPIDEMIA, UNSPECIFIED HYPERLIPIDEMIA TYPE: ICD-10-CM

## 2019-02-07 LAB
ANION GAP SERPL CALCULATED.3IONS-SCNC: 19 MMOL/L (ref 3–16)
BUN BLDV-MCNC: 17 MG/DL (ref 7–20)
CALCIUM SERPL-MCNC: 10.1 MG/DL (ref 8.3–10.6)
CHLORIDE BLD-SCNC: 98 MMOL/L (ref 99–110)
CO2: 26 MMOL/L (ref 21–32)
CREAT SERPL-MCNC: 0.9 MG/DL (ref 0.8–1.3)
GFR AFRICAN AMERICAN: >60
GFR NON-AFRICAN AMERICAN: >60
GLUCOSE BLD-MCNC: 115 MG/DL (ref 70–99)
POTASSIUM SERPL-SCNC: 4.5 MMOL/L (ref 3.5–5.1)
PRO-BNP: 595 PG/ML (ref 0–449)
SODIUM BLD-SCNC: 143 MMOL/L (ref 136–145)

## 2019-02-07 PROCEDURE — 71046 X-RAY EXAM CHEST 2 VIEWS: CPT

## 2019-02-07 PROCEDURE — 36415 COLL VENOUS BLD VENIPUNCTURE: CPT

## 2019-02-07 PROCEDURE — 99214 OFFICE O/P EST MOD 30 MIN: CPT | Performed by: INTERNAL MEDICINE

## 2019-02-07 PROCEDURE — 83880 ASSAY OF NATRIURETIC PEPTIDE: CPT

## 2019-02-07 PROCEDURE — 80048 BASIC METABOLIC PNL TOTAL CA: CPT

## 2019-02-07 RX ORDER — LOSARTAN POTASSIUM 25 MG/1
TABLET ORAL
Qty: 90 TABLET | Refills: 3 | Status: SHIPPED | OUTPATIENT
Start: 2019-02-07 | End: 2021-05-14

## 2019-02-07 RX ORDER — CARVEDILOL 3.12 MG/1
3.12 TABLET ORAL 2 TIMES DAILY WITH MEALS
Qty: 180 TABLET | Refills: 3 | Status: SHIPPED | OUTPATIENT
Start: 2019-02-07 | End: 2020-03-09

## 2019-02-07 RX ORDER — FUROSEMIDE 20 MG/1
20 TABLET ORAL 2 TIMES DAILY
COMMUNITY
End: 2019-02-07 | Stop reason: ALTCHOICE

## 2019-02-07 RX ORDER — TORSEMIDE 10 MG/1
10 TABLET ORAL PRN
Qty: 90 TABLET | Refills: 3 | Status: SHIPPED | OUTPATIENT
Start: 2019-02-07 | End: 2021-04-23 | Stop reason: ALTCHOICE

## 2019-02-07 RX ORDER — CLOPIDOGREL BISULFATE 75 MG/1
75 TABLET ORAL DAILY
Qty: 90 TABLET | Refills: 3 | Status: SHIPPED | OUTPATIENT
Start: 2019-02-07

## 2019-02-07 RX ORDER — ATORVASTATIN CALCIUM 40 MG/1
40 TABLET, FILM COATED ORAL DAILY
Qty: 90 TABLET | Refills: 3 | Status: SHIPPED | OUTPATIENT
Start: 2019-02-07 | End: 2020-03-09

## 2019-02-07 RX ORDER — AMITRIPTYLINE HYDROCHLORIDE 25 MG/1
25 TABLET, FILM COATED ORAL NIGHTLY
COMMUNITY

## 2019-02-11 ENCOUNTER — TELEPHONE (OUTPATIENT)
Dept: CARDIOLOGY CLINIC | Age: 79
End: 2019-02-11

## 2019-03-11 RX ORDER — TORSEMIDE 100 MG/1
TABLET ORAL
Qty: 30 TABLET | Refills: 5 | Status: SHIPPED | OUTPATIENT
Start: 2019-03-11 | End: 2021-04-23 | Stop reason: SDUPTHER

## 2020-03-09 RX ORDER — ATORVASTATIN CALCIUM 40 MG/1
TABLET, FILM COATED ORAL
Qty: 90 TABLET | Refills: 0 | Status: SHIPPED | OUTPATIENT
Start: 2020-03-09 | End: 2021-12-10 | Stop reason: SDUPTHER

## 2020-03-09 RX ORDER — CARVEDILOL 3.12 MG/1
TABLET ORAL
Qty: 180 TABLET | Refills: 0 | Status: SHIPPED | OUTPATIENT
Start: 2020-03-09 | End: 2021-05-14 | Stop reason: SDUPTHER

## 2021-03-04 ENCOUNTER — HOSPITAL ENCOUNTER (EMERGENCY)
Age: 81
Discharge: HOME | End: 2021-03-04
Attending: EMERGENCY MEDICINE
Payer: COMMERCIAL

## 2021-03-04 ENCOUNTER — APPOINTMENT (OUTPATIENT)
Dept: CT IMAGING | Age: 81
End: 2021-03-04
Payer: COMMERCIAL

## 2021-03-04 VITALS
BODY MASS INDEX: 29.99 KG/M2 | RESPIRATION RATE: 24 BRPM | TEMPERATURE: 98.1 F | HEIGHT: 65 IN | SYSTOLIC BLOOD PRESSURE: 118 MMHG | WEIGHT: 180 LBS | HEART RATE: 97 BPM | OXYGEN SATURATION: 93 % | DIASTOLIC BLOOD PRESSURE: 55 MMHG

## 2021-03-04 DIAGNOSIS — I50.32 CHRONIC DIASTOLIC HEART FAILURE (HCC): Primary | ICD-10-CM

## 2021-03-04 LAB
A/G RATIO: 1 (ref 1.1–2.2)
ALBUMIN SERPL-MCNC: 4.3 G/DL (ref 3.4–5)
ALP BLD-CCNC: 129 U/L (ref 40–129)
ALT SERPL-CCNC: 38 U/L (ref 10–40)
ANION GAP SERPL CALCULATED.3IONS-SCNC: 13 MMOL/L (ref 3–16)
AST SERPL-CCNC: 32 U/L (ref 15–37)
BASOPHILS ABSOLUTE: 0.1 K/UL (ref 0–0.2)
BASOPHILS RELATIVE PERCENT: 0.5 %
BILIRUB SERPL-MCNC: 0.5 MG/DL (ref 0–1)
BUN BLDV-MCNC: 22 MG/DL (ref 7–20)
CALCIUM SERPL-MCNC: 9.9 MG/DL (ref 8.3–10.6)
CHLORIDE BLD-SCNC: 95 MMOL/L (ref 99–110)
CO2: 29 MMOL/L (ref 21–32)
CREAT SERPL-MCNC: 1.1 MG/DL (ref 0.8–1.3)
EKG ATRIAL RATE: 100 BPM
EKG DIAGNOSIS: NORMAL
EKG P AXIS: 62 DEGREES
EKG P-R INTERVAL: 192 MS
EKG Q-T INTERVAL: 400 MS
EKG QRS DURATION: 150 MS
EKG QTC CALCULATION (BAZETT): 516 MS
EKG R AXIS: -25 DEGREES
EKG T AXIS: 134 DEGREES
EKG VENTRICULAR RATE: 100 BPM
EOSINOPHILS ABSOLUTE: 0.4 K/UL (ref 0–0.6)
EOSINOPHILS RELATIVE PERCENT: 4.5 %
GFR AFRICAN AMERICAN: >60
GFR NON-AFRICAN AMERICAN: >60
GLOBULIN: 4.5 G/DL
GLUCOSE BLD-MCNC: 241 MG/DL (ref 70–99)
HCT VFR BLD CALC: 43.5 % (ref 40.5–52.5)
HEMOGLOBIN: 14.2 G/DL (ref 13.5–17.5)
LYMPHOCYTES ABSOLUTE: 1.7 K/UL (ref 1–5.1)
LYMPHOCYTES RELATIVE PERCENT: 17.9 %
MCH RBC QN AUTO: 29.1 PG (ref 26–34)
MCHC RBC AUTO-ENTMCNC: 32.6 G/DL (ref 31–36)
MCV RBC AUTO: 89.1 FL (ref 80–100)
MONOCYTES ABSOLUTE: 0.9 K/UL (ref 0–1.3)
MONOCYTES RELATIVE PERCENT: 9.6 %
NEUTROPHILS ABSOLUTE: 6.3 K/UL (ref 1.7–7.7)
NEUTROPHILS RELATIVE PERCENT: 67.5 %
PDW BLD-RTO: 15 % (ref 12.4–15.4)
PLATELET # BLD: 373 K/UL (ref 135–450)
PMV BLD AUTO: 8 FL (ref 5–10.5)
POTASSIUM REFLEX MAGNESIUM: 3.7 MMOL/L (ref 3.5–5.1)
PRO-BNP: 3304 PG/ML (ref 0–449)
RBC # BLD: 4.88 M/UL (ref 4.2–5.9)
SODIUM BLD-SCNC: 137 MMOL/L (ref 136–145)
TOTAL PROTEIN: 8.8 G/DL (ref 6.4–8.2)
TROPONIN: <0.01 NG/ML
WBC # BLD: 9.4 K/UL (ref 4–11)

## 2021-03-04 PROCEDURE — 84484 ASSAY OF TROPONIN QUANT: CPT

## 2021-03-04 PROCEDURE — 71260 CT THORAX DX C+: CPT

## 2021-03-04 PROCEDURE — 85025 COMPLETE CBC W/AUTO DIFF WBC: CPT

## 2021-03-04 PROCEDURE — 93010 ELECTROCARDIOGRAM REPORT: CPT | Performed by: INTERNAL MEDICINE

## 2021-03-04 PROCEDURE — 99283 EMERGENCY DEPT VISIT LOW MDM: CPT

## 2021-03-04 PROCEDURE — 80053 COMPREHEN METABOLIC PANEL: CPT

## 2021-03-04 PROCEDURE — 36415 COLL VENOUS BLD VENIPUNCTURE: CPT

## 2021-03-04 PROCEDURE — 83880 ASSAY OF NATRIURETIC PEPTIDE: CPT

## 2021-03-04 PROCEDURE — 6360000004 HC RX CONTRAST MEDICATION: Performed by: EMERGENCY MEDICINE

## 2021-03-04 PROCEDURE — 93005 ELECTROCARDIOGRAM TRACING: CPT | Performed by: EMERGENCY MEDICINE

## 2021-03-04 RX ORDER — VITAMIN B COMPLEX
1 CAPSULE ORAL DAILY
COMMUNITY
End: 2021-08-04

## 2021-03-04 RX ADMIN — IOPAMIDOL 75 ML: 755 INJECTION, SOLUTION INTRAVENOUS at 14:39

## 2021-03-04 NOTE — ED NOTES
PT in from home with son who states he has been experiencing shortness of breath since arriving home from his recent trip to [facility]. PT states he saw his PCP yesterday but wanted him to be evaluated for possible pulmonary emboli. PT's son states that he did not take his diuretic medication during his trip. Lung sounds are diminished bilaterally with bilateral coarse crackles and dry cough. PT denies any chest pain. PT is alert and oriented X4. PT placed on telemetry monitor with ST elevation capabilities. Will continue with current plan of care.      Connor Chandra RN  03/04/21 6749

## 2021-03-04 NOTE — ED PROVIDER NOTES
[facility]        Pt Name: Francis Marie  MRN: [id number]  [place] 1940  Date of evaluation: 3/4/2021  Provider: Mariza Clifford MD  PCP: Verner Camel, MD    This patient was seen and evaluated by the attending physician Mariza Clifford MD.      [facility]       Chief Complaint   Patient presents with    Shortness of Breath     pcp said to bring to ER for possible afib; prostate enzyme elevated per lab work performed at [address] [facility]   (Location/Symptom, Timing/Onset, Context/Setting, Quality, Duration, Modifying Factors, Severity)  Note limiting factors. Francis Marie is a [de-identified] y.o. male here today with his son for chief complaint of complains of exertional dyspnea and variable oxygen saturations. Recently came back from [facility] had his first coronavirus vaccine. No fevers chills sweats nausea or diarrhea. No chest pain. History of CHF for which he has been somewhat noncompliant with his diuretic ever since coming back [facility]. Nursing Notes were all reviewed and agreed with or any disagreements were addressed  in the HPI. REVIEW OF SYSTEMS    (2-9 systems for level 4, 10 or more for level 5)     Review of Systems    Positives and Pertinent negatives as per HPI. Except as noted abovein the ROS, all other systems were reviewed and negative.        PAST MEDICAL HISTORY     Past Medical History:   Diagnosis Date    Acute MI ([address]     CAD (coronary artery disease)     CHF (congestive heart failure), NYHA class III ([address]     Diabetes mellitus ([address]     HTN (hypertension) 10/26/2011    Hyperlipidemia 10/26/2011         SURGICAL HISTORY     Past Surgical History:   Procedure Laterality Date    CARDIAC SURGERY      stent  x1   2008    CAROTID ENDARTERECTOMY Left 11/02/2016    LEFT CAROTID ENDARTERECTOMY WITH PATCH ANGIOPLASTY    CORONARY ANGIOPLASTY WITH STENT PLACEMENT  05/31/2018  EYE SURGERY Bilateral     cataracts w/lens         CURRENTMEDICATIONS       Previous Medications    AMITRIPTYLINE (ELAVIL) 25 MG TABLET    Take 25 mg by mouth nightly    ASPIRIN 81 MG EC TABLET    Take 81 mg by mouth daily. ATORVASTATIN (LIPITOR) 40 MG TABLET    TAKE ONE TABLET BY MOUTH DAILY    B COMPLEX VITAMINS CAPSULE    Take 1 capsule by mouth daily    CARVEDILOL (COREG) 3.125 MG TABLET    TAKE ONE TABLET BY MOUTH TWICE A DAY WITH MEALS    CLOPIDOGREL (PLAVIX) 75 MG TABLET    Take 1 tablet by mouth daily    DAPAGLIFLOZIN (FARXIGA) 5 MG TABLET    Take 5 mg by mouth every morning    DULAGLUTIDE (TRULICITY) 8.95 LX/3.2TY SOPN    Inject into the skin    FAMOTIDINE (PEPCID) 20 MG TABLET    Take 20 mg by mouth 2 times daily    FERROUS SULFATE 325 (65 FE) MG TABLET    Take 325 mg by mouth 2 times daily. GABAPENTIN (NEURONTIN) 600 MG TABLET    Take 600 mg by mouth 2 times daily. Brian Medeiros GLUCOSE BLOOD VI TEST STRIPS (ASCENSIA AUTODISC VI;ONE TOUCH ULTRA TEST VI) STRIP    1 each by Does not apply route daily. As needed. INSULIN GLARGINE (BASAGLAR KWIKPEN) 100 UNIT/ML INJECTION PEN    Inject 60 Units into the skin nightly    INVOKANA 100 MG TABS TABLET        LOSARTAN (COZAAR) 25 MG TABLET    TAKE ONE TABLET BY MOUTH DAILY    METFORMIN (GLUCOPHAGE) 1000 MG TABLET    Take 1 tablet by mouth 2 times daily (with meals). NITROGLYCERIN (NITROSTAT) 0.4 MG SL TABLET    Place 1 tablet under the tongue every 5 minutes as needed for Chest pain (No more than 3 tablets in a 15 minute period.). THERAPEUTIC MULTIVITAMIN-MINERALS (THERAGRAN-M) TABLET    Take 2 tablets by mouth daily.     TOLTERODINE (DETROL) 2 MG TABLET    Take 2 mg by mouth 2 times daily    TORSEMIDE (DEMADEX) 10 MG TABLET    Take 1 tablet by mouth as needed (daily prn)    TORSEMIDE (DEMADEX) 100 MG TABLET    Take 1 tablet by mouth daily    TORSEMIDE (DEMADEX) 100 MG TABLET    TAKE ONE-HALF TABLET BY MOUTH DAILY         ALLERGIES Penicillins    FAMILYHISTORY       Family History   Problem Relation Age of Onset    Heart Attack Brother     Heart Disease Brother     Heart Attack Brother           SOCIAL HISTORY       Social History     Socioeconomic History    Marital status:      Spouse name: None    Number of children: None    Years of education: None    Highest education level: None   Occupational History    None   Social Needs    Financial resource strain: None    Food insecurity     Worry: None     Inability: None    Transportation needs     Medical: None     Non-medical: None   Tobacco Use    Smoking status: Never Smoker    Smokeless tobacco: Never Used   Substance and Sexual Activity    Alcohol use: No    Drug use: No    Sexual activity: Yes     Partners: Female   Lifestyle    Physical activity     Days per week: None     Minutes per session: None    Stress: None   Relationships    Social connections     Talks on phone: None     Gets together: None     Attends Hoahaoism service: None     Active member of club or organization: None     Attends meetings of clubs or organizations: None     Relationship status: None    Intimate partner violence     Fear of current or ex partner: None     Emotionally abused: None     Physically abused: None     Forced sexual activity: None   Other Topics Concern    None   Social History Narrative    None       SCREENINGS             PHYSICAL EXAM    (up to 7 for level 4, 8 or more for level 5)     ED Triage Vitals [03/04/21 1310]   BP Temp Temp Source Pulse Resp SpO2 Height Weight   123/71 98.1 °F (36.7 °C) Oral 99 22 93 % 5' 5\" (1.651 m) 180 lb (81.6 kg)       Physical Exam     General Appearance:  Alert, cooperative, no distress, appears stated age. Head:  Normocephalic, without obvious abnormality, atraumatic. Eyes:  conjunctiva/corneas clear, EOM's intact. Sclera anicteric. ENT: Mucous membranes moist.   Neck: Supple, symmetrical, trachea midline, no adenopathy.   No jugular venous distention. Lungs:   No Respiratory Distress. Faint crackles to left lung apex. Chest Wall:  Atraumatic   Heart:  RRR   Abdomen:   Soft, NT, ND   Extremities:  Full range of motion. Pulses: Symmetrioc x4   Skin:  No rashes or lesions to exposed skin. Neurologic: Alert and oriented X 3. Motor grossly normal.  Speech clear. DIAGNOSTIC RESULTS   LABS:    Labs Reviewed   COMPREHENSIVE METABOLIC PANEL W/ REFLEX TO MG FOR LOW K - Abnormal; Notable for the following components:       Result Value    Chloride 95 (*)     Glucose 241 (*)     BUN 22 (*)     Total Protein 8.8 (*)     Albumin/Globulin Ratio 1.0 (*)     All other components within normal limits    Narrative:     Performed at:  [facility] [address], [facility], [facility] [employer]   Phone [phone]   BRAIN NATRIURETIC PEPTIDE - Abnormal; Notable for the following components:    Pro-BNP 3,304 (*)     All other components within normal limits    Narrative:     Performed at:  [facility] [address], [facility], [facility] [employer]   Phone [phone]   CBC WITH AUTO DIFFERENTIAL    Narrative:     Performed at:  [facility] [address], [facility], [facility] [employer]   Phone [phone]   TROPONIN    Narrative:     Performed at:  [facility] [address], [facility], [facility] [employer]   Phone [phone]       All other labs were within normal range or not returned as of this dictation. EKG   EKG is reviewed myself. Dated today at [id number]. Rate 100. Sinus rhythm. Left lower branch block. No significant change from June 2018.      Damián Glez MD  03/04/21 1314    RADIOLOGY:   Non-plain film images such as CT, Ultrasound and MRI are read by the radiologist. Plain radiographic images are visualized andpreliminarily interpreted by the  ED Provider with the below findings:        Interpretation perthe Prescriptions    No medications on file       DISCONTINUED MEDICATIONS:  Discontinued Medications    No medications on file              (Please note that portions ofthis note were completed with a voice recognition program.  Efforts were made to edit the dictations but occasionally words are mis-transcribed.)    Vladimir Desai MD (electronically signed)            Michelle Salazar MD  03/04/21 8912

## 2021-03-04 NOTE — ED TRIAGE NOTES
Pt presented to ER per son. Labs were performed at [facility]. Elevated BPH, possible afib. PCP directed to come to ER to be evaluated. Came back from [place] last Thursday.

## 2021-03-04 NOTE — PROGRESS NOTES
[facility], [place]   Cardiac Followup    Referring Provider:  Prachi Santiago MD     No chief complaint on file. Devika Montesinos is a [de-identified] y.o. male who is here today for follow up for a history of coronary artery disease- left heart cath 2017 and then a repeat right and left cath 2018 with PTCA Cx/OM1 with 2.0 x 12 JOANNA, hypertension, hyperlipidemia, CHF and SOB. Most recent echocardiogram from from 5/2018 showed an EF of 45-50%, mild to moderate tricuspid regurgitation. He presented to the ER yesterday (3/5/2021) with complaints of SOB and concerns by PCP for atrial fib. EKG showed NSR with LBBB, BNP 3,304, CT chest showed no evidence of PE, interstitial fibrosis. .  Past Medical History:   has a past medical history of Acute MI ([facility] CAD (coronary artery disease), CHF (congestive heart failure), NYHA class III ([facility] Diabetes mellitus ([facility] HTN (hypertension), and Hyperlipidemia. Surgical History:   has a past surgical history that includes Cardiac surgery; eye surgery (Bilateral); Carotid endarterectomy (Left, 11/02/2016); and Coronary angioplasty with stent (05/31/2018). Social History:   reports that he has never smoked. He has never used smokeless tobacco. He reports that he does not drink alcohol or use drugs. Family History:  family history includes Heart Attack in his brother and brother; Heart Disease in his brother. Home Medications:  Prior to Admission medications    Medication Sig Start Date End Date Taking? Authorizing Provider   carvedilol (COREG) 6.25 MG tablet Take 1 tablet by mouth 2 times daily (with meals). 8/10/12  Yes Mirza Pennington MD   atorvastatin (LIPITOR) 20 MG tablet Take 1 tablet by mouth daily. 1/26/12 1/25/13 Yes Mirza Pennington MD   losartan (COZAAR) 50 MG tablet Take 50 mg by mouth daily. Yes Historical Provider, MD   clopidogrel (PLAVIX) 75 MG tablet Take 1 tablet by mouth daily.  10/27/11  Yes Mirza Pennington MD   metformin (GLUCOPHAGE) 1000 MG tablet Take 1 tablet by mouth 2 times daily (with meals). 10/28/11  Yes Juana Rucker MD   aspirin 81 MG EC tablet Take 81 mg by mouth daily. Yes Historical Provider, MD   glyBURIDE (DIABETA) 5 MG tablet Take 5 mg by mouth 2 times daily (with meals). Yes Historical Provider, MD   ferrous sulfate 325 (65 FE) MG tablet Take 325 mg by mouth 2 times daily. Yes Historical Provider, MD   gabapentin (NEURONTIN) 600 MG tablet Take 600 mg by mouth 3 times daily. Yes Historical Provider, MD   therapeutic multivitamin-minerals (THERAGRAN-M) tablet Take 2 tablets by mouth daily. Yes Historical Provider, MD   nitroGLYCERIN (NITROSTAT) 0.4 MG SL tablet Place 1 tablet under the tongue every 5 minutes as needed. 11/8/11   [facility]        Allergies:  Penicillins     Review of Systems:   · Constitutional: there has been no unanticipated weight loss. There's been no change in energy level, sleep pattern, or activity level. · Eyes: No visual changes or diplopia. No scleral icterus. · ENT: No Headaches, hearing loss or vertigo. No mouth sores or sore throat. · Cardiovascular: Reviewed in HPI  · Respiratory: No cough or wheezing, no sputum production. No hematemesis. · Gastrointestinal: No abdominal pain, appetite loss, blood in stools. No change in bowel or bladder habits. · Genitourinary: No dysuria, trouble voiding, or hematuria. · Musculoskeletal:  No gait disturbance, weakness or joint complaints. · Integumentary: No rash or pruritis. · Neurological: No headache, diplopia, change in muscle strength, numbness or tingling. No change in gait, balance, coordination, mood, affect, memory, mentation, behavior. · Psychiatric: No anxiety, no depression. · Endocrine: No malaise, fatigue or temperature intolerance. No excessive thirst, fluid intake, or urination. No tremor.   · Hematologic/Lymphatic: No abnormal bruising or bleeding, blood clots or swollen lymph nodes.  · Allergic/Immunologic: No nasal congestion or hives. Physical Examination:    There were no vitals filed for this visit. Constitutional and General Appearance: NAD   Respiratory:  · Normal excursion and expansion without use of accessory muscles  Resp Auscultation: rales in bases                Cardiovascular:  · The apical impulses not displaced  · Heart tones are crisp and normal  · Elev JVP  · The carotid bruit bilaterally L > R  · Normal S1S2, No S3, 1/6 systolic murmur  · Peripheral pulses are symmetrical and full  · There is no clubbing, cyanosis of the extremities. · No edema  · Femoral Arteries: 2+ and equal  · Pedal Pulses: 2+ and equal   Abdomen:  · No masses or tenderness  · Liver/Spleen: No Abnormalities Noted  Neurological/Psychiatric:  · Alert and oriented in all spheres  · Moves all extremities well  · Exhibits normal gait balance and coordination  · No abnormalities of mood, affect, memory, mentation, or behavior are noted    Myoview 10/2013  There is an inferior fixed defect consistent with diaphragmatic attenuation. Normal LV function with ejection fraction of 68%. Abnormal EKG response. Similar to 2008 and 2009 studies. ECHO: 4/2016   Normal left ventricle size, wall thickness and systolic function with an   estimated ejection fraction of 55%. No regional wall motion abnormalities are seen.   Diastolic filling parameters suggests grade I diastolic dysfunction.   The aortic valve appears thickened/calcified with decreased leaflet mobility   but no significant stenosis by velocity measurements.   The aortic valve is tricuspid .   The maximum pressure gradient of 10 mmHg and a mean pressure gradient of 5 mmHg.   Trace aortic valve regurgitation.   MIld mitral and tricuspid regurgitation.   Systolic pulmonary artery pressure (SPAP) is normal and estimated at 27 mmHg (RA pressure 3 mmHg).   Carotid 10/2016   -The right internal carotid artery appears to have a 1-15% diameter reducing  stenosis based on velocity criteria.    -The left internal carotid artery appears to have a 50-79% diameter reducing    stenosis based on velocity criteria, however ICA/CCA PSV ratio of 6.2    suggest nearing 80%.       Carotid dopplers 5/19/17  Right:  1-15% stenosis of the internal carotid artery based on velocity criteria and  B-Mode image. <50% stenosis of the common carotid artery. <50% stenosis of the external carotid artery. Unremarkable subclavian and vertebral arteries. Left:  50-80% stenosis of the internal carotid artery based on velocity criteria,  however this may be secondary to the recent endarterectomy. There is no  significant plaque visualized. <50% stenosis of the common carotid artery. <50% stenosis of the external carotid artery. Unremarkable subclavian and vertebral arteries. CT 10/2016  Severe, focal stenosis of the left internal carotid artery bulb, 95% diameter. Mild narrowing of the petrous segment of the right carotid artery, 20%   diameter. Moderate sinusitis of the paranasal sinuses. Cath 8/2017  LM 20%  LAD prox stent patent, mid 40%  Cx 20%              OM1 95%              OM2 90%  RCA 40%              RPDA 99% ostium              RPL 50% ostium and distal  LVEF 55%  Normal right heart pressures     PTCA OM1              2.0 x 15 angiosculpt cutting balloon  PTCA OM3              2.0 x 6 cutting balloon  PTCA RPDA              2.0 x 6 cutting balloon    Echo 5/2018  Left ventricular cavity size is normal. Normal left ventricular wall  thickness. Left ventricular function is low normal with ejection fraction  estimated at 45-50%. Posterolateral wall appears hypokinetic. Diastolic  filling parameters suggest grade I diastolic dysfunction. Thickening of leaflets of mitral valve. Mitral annular calcification is  present. Mild mitral regurgitation is present. The left atrium is dilated. Aortic valve appears sclerotic but opens adequately.   Mild to moderate tricuspid regurgitation. Estimated pulmonary artery  systolic pressure is at 40 mmHg     Right and left heart cath 5/31/18  LM 20%  LAD 30% prox in stent  Cx 95% prox extending into OM1              OM1 95% ostium  RCA 50%              RPL 50%  LVEF 40%  RA 4,  RV 23/2,  PA 22/5/11,  W 2     PTCA Cx/OM1              2.0 x 12 JOANNA  Low right heart pressures noted assoc w/ hypotension. Hold torsemide. Reduce to 20 mg daily at discharge. EKG 3/4/2021  Normal sinus rhythm  Left bundle branch block  Abnormal ECG    CT chest 34/2/2021  Impression 1. No evidence of pulmonary embolism. 2. Findings consistent with interstitial fibrosis. Assessment:   1. Hyperlipidemia: stable. Gets checked thru PCP. Reviewed [id number]   2. HTN (hypertension):  -- well controlled   3. CAD- PTCA Cx/OM1, 2.0 x 12 JOANNA   4. Chest pain - no recurrence post PCI 8/2017. remains stable   6. Carotid stenosis - s/p L-CEA  7. Leg pain   8. LEWIS - no sig improvement w/ tx of CHF in past and cath post diuretics showed low pressures. Do not believe cardiac but will ck CXR and labs. Suspect deconditioning. Mild to moderate tricuspid regurgitation    Plan:    Patient not seen in clinic. Reports return from [facility] 1 week ago. Now with SOB and hypoxia. Instructed to have covid test. Resume torsemide (had been held for 5 days during travel). Merlin Vallejo M.D., Kathie Dumont

## 2021-03-05 ENCOUNTER — OFFICE VISIT (OUTPATIENT)
Dept: CARDIOLOGY CLINIC | Age: 81
End: 2021-03-05

## 2021-03-05 ENCOUNTER — CARE COORDINATION (OUTPATIENT)
Dept: CARE COORDINATION | Age: 81
End: 2021-03-05

## 2021-03-05 VITALS
DIASTOLIC BLOOD PRESSURE: 74 MMHG | HEIGHT: 65 IN | SYSTOLIC BLOOD PRESSURE: 118 MMHG | OXYGEN SATURATION: 96 % | WEIGHT: 159 LBS | BODY MASS INDEX: 26.49 KG/M2 | HEART RATE: 107 BPM

## 2021-03-05 DIAGNOSIS — Z98.61 CAD S/P PERCUTANEOUS CORONARY ANGIOPLASTY: Primary | ICD-10-CM

## 2021-03-05 DIAGNOSIS — I10 ESSENTIAL HYPERTENSION: ICD-10-CM

## 2021-03-05 DIAGNOSIS — I25.110 CORONARY ARTERY DISEASE INVOLVING NATIVE CORONARY ARTERY OF NATIVE HEART WITH UNSTABLE ANGINA PECTORIS (HCC): ICD-10-CM

## 2021-03-05 DIAGNOSIS — R06.02 SOB (SHORTNESS OF BREATH): ICD-10-CM

## 2021-03-05 DIAGNOSIS — I50.32 CHRONIC DIASTOLIC CONGESTIVE HEART FAILURE (HCC): ICD-10-CM

## 2021-03-05 DIAGNOSIS — I25.10 CAD S/P PERCUTANEOUS CORONARY ANGIOPLASTY: Primary | ICD-10-CM

## 2021-03-05 NOTE — CARE COORDINATION
Patient contacted regarding recent visit for viral symptoms.  contacted the patient by telephone to perform post discharge call. Call within 2 business days of discharge: Yes    Contacted pt using [email or web address].  ID: [id number] used.  left message on machine requesting a return call. If no return call, will attempt to reach pt again.

## 2021-03-05 NOTE — LETTER
[address]   Cardiac Followup    Referring Provider:  Maria Fernanda Mckeon MD     No chief complaint on file. Shane Hernández is a [de-identified] y.o. male who is here today for follow up for a history of coronary artery disease- left heart cath 2017 and then a repeat right and left cath 2018 with PTCA Cx/OM1 with 2.0 x 12 JOANNA, hypertension, hyperlipidemia, CHF and SOB. Most recent echocardiogram from from 5/2018 showed an EF of 45-50%, mild to moderate tricuspid regurgitation. He presented to the ER yesterday (3/5/2021) with complaints of SOB and concerns by PCP for atrial fib. EKG showed NSR with LBBB, BNP 3,304, CT chest showed no evidence of PE, interstitial fibrosis. .  Past Medical History:   has a past medical history of Acute MI ([facility] [address] CAD (coronary artery disease), CHF (congestive heart failure), NYHA class III ([facility] [address] Diabetes mellitus ([facility] [address] HTN (hypertension), and Hyperlipidemia. Surgical History:   has a past surgical history that includes Cardiac surgery; eye surgery (Bilateral); Carotid endarterectomy (Left, 11/02/2016); and Coronary angioplasty with stent (05/31/2018). Social History:   reports that he has never smoked. He has never used smokeless tobacco. He reports that he does not drink alcohol or use drugs. Family History:  family history includes Heart Attack in his brother and brother; Heart Disease in his brother. Home Medications:  Prior to Admission medications    Medication Sig Start Date End Date Taking? Authorizing Provider   carvedilol (COREG) 6.25 MG tablet Take 1 tablet by mouth 2 times daily (with meals). 8/10/12  Yes Harvey Salazar MD   atorvastatin (LIPITOR) 20 MG tablet Take 1 tablet by mouth daily. 1/26/12 1/25/13 Yes Harvey Salazar MD   losartan (COZAAR) 50 MG tablet Take 50 mg by mouth daily. Yes Historical Provider, MD   clopidogrel (PLAVIX) 75 MG tablet Take 1 tablet by mouth daily.  10/27/11  Yes Harvey Salazar MD metformin (GLUCOPHAGE) 1000 MG tablet Take 1 tablet by mouth 2 times daily (with meals). 10/28/11  Yes Zulema Vivar MD   aspirin 81 MG EC tablet Take 81 mg by mouth daily. Yes Historical Provider, MD   glyBURIDE (DIABETA) 5 MG tablet Take 5 mg by mouth 2 times daily (with meals). Yes Historical Provider, MD   ferrous sulfate 325 (65 FE) MG tablet Take 325 mg by mouth 2 times daily. Yes Historical Provider, MD   gabapentin (NEURONTIN) 600 MG tablet Take 600 mg by mouth 3 times daily. Yes Historical Provider, MD   therapeutic multivitamin-minerals (THERAGRAN-M) tablet Take 2 tablets by mouth daily. Yes Historical Provider, MD   nitroGLYCERIN (NITROSTAT) 0.4 MG SL tablet Place 1 tablet under the tongue every 5 minutes as needed. 11/8/11   Lenora Mcwilliams, SADA        Allergies:  Penicillins     Review of Systems:   · Constitutional: there has been no unanticipated weight loss. There's been no change in energy level, sleep pattern, or activity level. · Eyes: No visual changes or diplopia. No scleral icterus. · ENT: No Headaches, hearing loss or vertigo. No mouth sores or sore throat. · Cardiovascular: Reviewed in HPI  · Respiratory: No cough or wheezing, no sputum production. No hematemesis. · Gastrointestinal: No abdominal pain, appetite loss, blood in stools. No change in bowel or bladder habits. · Genitourinary: No dysuria, trouble voiding, or hematuria. · Musculoskeletal:  No gait disturbance, weakness or joint complaints. · Integumentary: No rash or pruritis. · Neurological: No headache, diplopia, change in muscle strength, numbness or tingling. No change in gait, balance, coordination, mood, affect, memory, mentation, behavior. · Psychiatric: No anxiety, no depression. · Endocrine: No malaise, fatigue or temperature intolerance. No excessive thirst, fluid intake, or urination. No tremor. · Hematologic/Lymphatic: No abnormal bruising or bleeding, blood clots or swollen lymph nodes. · Allergic/Immunologic: No nasal congestion or hives. Physical Examination:    There were no vitals filed for this visit. Constitutional and General Appearance: NAD   Respiratory:  · Normal excursion and expansion without use of accessory muscles  Resp Auscultation: rales in bases                Cardiovascular:  · The apical impulses not displaced  · Heart tones are crisp and normal  · Elev JVP  · The carotid bruit bilaterally L > R  · Normal S1S2, No S3, 1/6 systolic murmur  · Peripheral pulses are symmetrical and full  · There is no clubbing, cyanosis of the extremities. · No edema  · Femoral Arteries: 2+ and equal  · Pedal Pulses: 2+ and equal   Abdomen:  · No masses or tenderness  · Liver/Spleen: No Abnormalities Noted  Neurological/Psychiatric:  · Alert and oriented in all spheres  · Moves all extremities well  · Exhibits normal gait balance and coordination  · No abnormalities of mood, affect, memory, mentation, or behavior are noted    Myoview 10/2013  There is an inferior fixed defect consistent with diaphragmatic attenuation. Normal LV function with ejection fraction of 68%. Abnormal EKG response. Similar to 2008 and 2009 studies. ECHO: 4/2016   Normal left ventricle size, wall thickness and systolic function with an   estimated ejection fraction of 55%. No regional wall motion abnormalities are seen.   Diastolic filling parameters suggests grade I diastolic dysfunction.   The aortic valve appears thickened/calcified with decreased leaflet mobility   but no significant stenosis by velocity measurements.   The aortic valve is tricuspid .   The maximum pressure gradient of 10 mmHg and a mean pressure gradient of 5 mmHg.   Trace aortic valve regurgitation.   MIld mitral and tricuspid regurgitation.   Systolic pulmonary artery pressure (SPAP) is normal and estimated at 27 mmHg (RA pressure 3 mmHg). Carotid 10/2016   -The right internal carotid artery appears to have a 1-15% diameter reducing    stenosis based on velocity criteria.    -The left internal carotid artery appears to have a 50-79% diameter reducing    stenosis based on velocity criteria, however ICA/CCA PSV ratio of 6.2    suggest nearing 80%.       Carotid dopplers 5/19/17  Right:  1-15% stenosis of the internal carotid artery based on velocity criteria and  B-Mode image. <50% stenosis of the common carotid artery. <50% stenosis of the external carotid artery. Unremarkable subclavian and vertebral arteries. Left:  50-80% stenosis of the internal carotid artery based on velocity criteria,  however this may be secondary to the recent endarterectomy. There is no  significant plaque visualized. <50% stenosis of the common carotid artery. <50% stenosis of the external carotid artery. Unremarkable subclavian and vertebral arteries. CT 10/2016  Severe, focal stenosis of the left internal carotid artery bulb, 95% diameter. Mild narrowing of the petrous segment of the right carotid artery, 20%   diameter. Moderate sinusitis of the paranasal sinuses. Cath 8/2017  LM 20%  LAD prox stent patent, mid 40%  Cx 20%              OM1 95%              OM2 90%  RCA 40%              RPDA 99% ostium              RPL 50% ostium and distal  LVEF 55%  Normal right heart pressures     PTCA OM1              2.0 x 15 angiosculpt cutting balloon  PTCA OM3              2.0 x 6 cutting balloon  PTCA RPDA              2.0 x 6 cutting balloon    Echo 5/2018  Left ventricular cavity size is normal. Normal left ventricular wall  thickness. Left ventricular function is low normal with ejection fraction  estimated at 45-50%. Posterolateral wall appears hypokinetic. Diastolic  filling parameters suggest grade I diastolic dysfunction. Thickening of leaflets of mitral valve. Mitral annular calcification is  present. Mild mitral regurgitation is present. The left atrium is dilated. Aortic valve appears sclerotic but opens adequately. Mild to moderate tricuspid regurgitation. Estimated pulmonary artery  systolic pressure is at 40 mmHg     Right and left heart cath 5/31/18  LM 20%  LAD 30% prox in stent  Cx 95% prox extending into OM1              OM1 95% ostium  RCA 50%              RPL 50%  LVEF 40%  RA 4,  RV 23/2,  PA 22/5/11,  W 2     PTCA Cx/OM1              2.0 x 12 JOANNA  Low right heart pressures noted assoc w/ hypotension. Hold torsemide. Reduce to 20 mg daily at discharge. EKG 3/4/2021  Normal sinus rhythm  Left bundle branch block  Abnormal ECG    CT chest 34/2/2021  Impression 1. No evidence of pulmonary embolism. 2. Findings consistent with interstitial fibrosis. Assessment:   1. Hyperlipidemia: stable. Gets checked thru PCP. Reviewed [id number]   2. HTN (hypertension):  -- well controlled   3. CAD- PTCA Cx/OM1, 2.0 x 12 JOANNA   4. Chest pain - no recurrence post PCI 8/2017. remains stable   6. Carotid stenosis - s/p L-CEA  7. Leg pain   8. LEWIS - no sig improvement w/ tx of CHF in past and cath post diuretics showed low pressures. Do not believe cardiac but will ck CXR and labs. Suspect deconditioning. Mild to moderate tricuspid regurgitation    Plan:    Patient not seen in clinic. Reports return from [facility] 1 week ago. Now with SOB and hypoxia. Instructed to have covid test. Resume torsemide (had been held for 5 days during travel). Christin Donovan M.D., Meek Maynard

## 2021-03-08 NOTE — CARE COORDINATION
Patient contacted regarding recent visit for viral symptoms.  contacted the patient by telephone to perform post discharge call. Verified name and  with family as identifiers. Provided introduction to self, and reason for call due to viral symptoms of infection and/or exposure to COVID-19. Call within 2 business days of discharge: Yes    Contacted pt using [employer].  ID: [id number] used. Spoke to son, Nurys Zazueta who spoke [place]. Son reports that pt is feeling \"better\" and symptoms are improving. Son reports that he has been in contact with PCP, Dr. Rolf Blevins and Cardiologist, Dr. Aidan Marcum office. Son reports that he is taking pt to have COVID testing completed today and is waiting for order to be put in through Dr. Aidan Marcum office. Son reports that he has sent Dr. Aidan Marcum a [employer] message and is waiting to hear back. Son was agreeable to follow up call next week for symptom recheck. Son thanked author for calling to follow up. Patient presented to emergency department/flu clinic with complaints of viral symptoms/exposure to COVID. Patient reports symptoms are improving. Due to no new or worsening symptoms the RN CTN/ACM was not notified for escalation. Discussed exposure protocols and quarantine with CDC Guidelines What To Do If You Are Sick    Family was given an opportunity for questions and concerns. Stay home except to get medical care    Separate yourself from other people and animals in your home    Call ahead before visiting your doctor    Wear a facemask    Cover your coughs and sneezes    Clean your hands often    Avoid sharing personal household items    Clean all high-touch surfaces everyday    Monitor your symptoms  Seek prompt medical attention if your illness is worsening (e.g., difficulty breathing). Before seeking care, call your healthcare provider and tell them that you have, or are being evaluated for, COVID-19.  Put on a facemask before you enter the facility. These steps will help the healthcare provider's office to keep other people in the office or waiting room from getting infected or exposed. Ask your healthcare provider to call the local or [facility] health department. Persons who are placed under If you have a medical emergency and need to call 911, notify the dispatch personnel that you have, or are being evaluated for COVID-19. If possible, put on a facemask before emergency medical services arrive. The family agrees to contact the [facility] exposure line [phone], local health department [address]: ([phone]) and PCP office for questions related to their healthcare. Author provided contact information for future reference. Patient/family/caregiver given information for [employer] and agrees to enroll no  Patient's preferred e-mail:    Patient's preferred phone number:   Based on Loop alert triggers, patient will be contacted by nurse care manager for worsening symptoms.

## 2021-03-09 ENCOUNTER — OFFICE VISIT (OUTPATIENT)
Dept: PRIMARY CARE CLINIC | Age: 81
End: 2021-03-09
Payer: COMMERCIAL

## 2021-03-09 DIAGNOSIS — Z20.828 EXPOSURE TO SARS-ASSOCIATED CORONAVIRUS: Primary | ICD-10-CM

## 2021-03-09 PROCEDURE — 99211 OFF/OP EST MAY X REQ PHY/QHP: CPT | Performed by: NURSE PRACTITIONER

## 2021-03-10 LAB — SARS-COV-2: NOT DETECTED

## 2021-03-11 NOTE — PROGRESS NOTES
Julius Serna received a viral test for COVID-19. They were educated on isolation and quarantine as appropriate. For any symptoms, they were directed to seek care from their PCP, given contact information to establish with a doctor, directed to an urgent care or the emergency room.

## 2021-03-15 ENCOUNTER — CARE COORDINATION (OUTPATIENT)
Dept: CARE COORDINATION | Age: 81
End: 2021-03-15

## 2021-03-15 NOTE — CARE COORDINATION
Patient contacted regarding COVID-19 risk and screening.  contacted the patient by telephone to perform follow-up call. Verified name and  with family as identifiers. Symptoms reviewed with family. Patient reports symptoms are improving. Due to no new or worsening symptoms the RN CTN/ACM was not notified for escalation. Spoke to son, Ubaldo Lee who speaks English and did not require . Son reports that pt is feeling \"better\" and SOB has improved. Son reports that pt's COVID test came back negative and is receiving his 2nd COVID vaccine this week. Son reports that pt has a follow up appointment with Cardiologist this Friday, 3/19. Son was agreeable to follow up call next week for symptom recheck. Son thanked author for calling to follow up. This author reviewed discharge instructions, medical action plan and red flags such as increased shortness of breath, increasing fever, worsening cough or chest pain with family who verbalized understanding. Discussed exposure protocols and quarantine with CDC Guidelines What To Do If You Are Sick    Family who was given an opportunity for questions and concerns. The family agrees to contact the [facility] exposure line [phone], [facility] department [address]: ([phone])[facility] PCP office for questions related to their healthcare. Author provided contact information for future reference.

## 2021-03-18 NOTE — PROGRESS NOTES
[address]   Cardiac Followup    Referring Provider:  Gabriel Santos MD     Chief Complaint   Patient presents with    Follow-up    Coronary Artery Disease    Congestive Heart Failure    Hypertension    Hyperlipidemia    Shortness of Breath     with any movement    Chest Pain     with cough    Headache     1/2 his head     Wing Kalani is a [de-identified] y.o. male who is here today for follow up for a history of coronary artery disease- left heart cath 2017 and then a repeat right and left cath 2018 with PTCA Cx/OM1 with 2.0 x 12 JOANNA, hypertension, hyperlipidemia, CHF and SOB. Most recent echocardiogram from from 5/2018 showed an EF of 45-50%, mild to moderate tricuspid regurgitation. He presented to the ER yesterday (3/5/2021) with complaints of SOB and concerns by PCP for atrial fib. EKG showed NSR with LBBB, BNP 3,304, CT chest showed no evidence of PE, interstitial fibrosis. Patient had been visiting [facility] and returned home with SOB and hypoxia. COVID testing negative 3/9/2021. Today her son states patient just got back from [facility] after being there for 2 years. He has been having SOB with activity like walking 10 stairs. Started after return from [facility]. He had stopped his torsemide for 5 days prior to traveling to come home. He has restarted his Torsemide at 100 mg daily and complains of dry mouth and thirst. (normall takes 60 mg daily) SOB has decreased some overall. No assoc chest pain. Resolves w/ rest. He has a cough and a sore throat he thinks is related to his mouth being dry. He has been drinking more due to thirst. He has some chest pain that only occurs when he coughs. Patient currently denies any weight gain, edema, palpitations, chest pain, dizziness, and syncope.       .  Past Medical History:   has a past medical history of Acute MI ([facility] [address] CAD (coronary artery disease), CHF (congestive heart failure), NYHA class III ([address] Diabetes mellitus ([address] HTN (hypertension), and Hyperlipidemia. Surgical History:   has a past surgical history that includes Cardiac surgery; eye surgery (Bilateral); Carotid endarterectomy (Left, 11/02/2016); and Coronary angioplasty with stent (05/31/2018). Social History:   reports that he has never smoked. He has never used smokeless tobacco. He reports that he does not drink alcohol or use drugs. Family History:  family history includes Heart Attack in his brother and brother; Heart Disease in his brother. Home Medications:  Prior to Admission medications    Medication Sig Start Date End Date Taking? Authorizing Provider   carvedilol (COREG) 6.25 MG tablet Take 1 tablet by mouth 2 times daily (with meals). 8/10/12  Yes Chris Ansari MD   atorvastatin (LIPITOR) 20 MG tablet Take 1 tablet by mouth daily. 1/26/12 1/25/13 Yes Chris Ansari MD   losartan (COZAAR) 50 MG tablet Take 50 mg by mouth daily. Yes Historical Provider, MD   clopidogrel (PLAVIX) 75 MG tablet Take 1 tablet by mouth daily. 10/27/11  Yes Chris Ansari MD   metformin (GLUCOPHAGE) 1000 MG tablet Take 1 tablet by mouth 2 times daily (with meals). 10/28/11  Yes Brandi Argueta MD   aspirin 81 MG EC tablet Take 81 mg by mouth daily. Yes Historical Provider, MD   glyBURIDE (DIABETA) 5 MG tablet Take 5 mg by mouth 2 times daily (with meals). Yes Historical Provider, MD   ferrous sulfate 325 (65 FE) MG tablet Take 325 mg by mouth 2 times daily. Yes Historical Provider, MD   gabapentin (NEURONTIN) 600 MG tablet Take 600 mg by mouth 3 times daily. Yes Historical Provider, MD   therapeutic multivitamin-minerals (THERAGRAN-M) tablet Take 2 tablets by mouth daily. Yes Historical Provider, MD   nitroGLYCERIN (NITROSTAT) 0.4 MG SL tablet Place 1 tablet under the tongue every 5 minutes as needed. 11/8/11   Ishaan Patterson NP        Allergies:  Penicillins     Review of Systems:   · Constitutional: there has been no unanticipated weight loss.  There's been no change in energy level, sleep pattern, or activity level. · Eyes: No visual changes or diplopia. No scleral icterus. · ENT: No Headaches, hearing loss or vertigo. No mouth sores or sore throat. · Cardiovascular: Reviewed in HPI  · Respiratory: No cough or wheezing, no sputum production. No hematemesis. · Gastrointestinal: No abdominal pain, appetite loss, blood in stools. No change in bowel or bladder habits. · Genitourinary: No dysuria, trouble voiding, or hematuria. · Musculoskeletal:  No gait disturbance, weakness or joint complaints. · Integumentary: No rash or pruritis. · Neurological: No headache, diplopia, change in muscle strength, numbness or tingling. No change in gait, balance, coordination, mood, affect, memory, mentation, behavior. · Psychiatric: No anxiety, no depression. · Endocrine: No malaise, fatigue or temperature intolerance. No excessive thirst, fluid intake, or urination. No tremor. · Hematologic/Lymphatic: No abnormal bruising or bleeding, blood clots or swollen lymph nodes. · Allergic/Immunologic: No nasal congestion or hives. Physical Examination:    Vitals:    03/19/21 0948   BP: 134/86   Pulse: 71   SpO2: 91%        Constitutional and General Appearance: NAD   Respiratory:  · Normal excursion and expansion without use of accessory muscles  Resp Auscultation: rales in bases                Cardiovascular:  · The apical impulses not displaced  · Heart tones are crisp and normal  · Elev JVP  · The carotid bruit bilaterally L > R  · Normal S1S2, No S3, 1/6 systolic murmur  · Peripheral pulses are symmetrical and full  · There is no clubbing, cyanosis of the extremities.   · No edema  · Femoral Arteries: 2+ and equal  · Pedal Pulses: 2+ and equal   Abdomen:  · No masses or tenderness  · Liver/Spleen: No Abnormalities Noted  Neurological/Psychiatric:  · Alert and oriented in all spheres  · Moves all extremities well  · Exhibits normal gait balance and coordination  · No abnormalities of mood, affect, memory, mentation, or behavior are noted    Myoview 10/2013  There is an inferior fixed defect consistent with diaphragmatic attenuation. Normal LV function with ejection fraction of 68%. Abnormal EKG response. Similar to 2008 and 2009 studies. ECHO: 4/2016   Normal left ventricle size, wall thickness and systolic function with an   estimated ejection fraction of 55%. No regional wall motion abnormalities are seen.   Diastolic filling parameters suggests grade I diastolic dysfunction.   The aortic valve appears thickened/calcified with decreased leaflet mobility   but no significant stenosis by velocity measurements.   The aortic valve is tricuspid .   The maximum pressure gradient of 10 mmHg and a mean pressure gradient of 5 mmHg.   Trace aortic valve regurgitation.   MIld mitral and tricuspid regurgitation.   Systolic pulmonary artery pressure (SPAP) is normal and estimated at 27 mmHg (RA pressure 3 mmHg). Carotid 10/2016   -The right internal carotid artery appears to have a 1-15% diameter reducing    stenosis based on velocity criteria.    -The left internal carotid artery appears to have a 50-79% diameter reducing    stenosis based on velocity criteria, however ICA/CCA PSV ratio of 6.2    suggest nearing 80%.       Carotid dopplers 5/19/17  Right:  1-15% stenosis of the internal carotid artery based on velocity criteria and  B-Mode image. <50% stenosis of the common carotid artery. <50% stenosis of the external carotid artery. Unremarkable subclavian and vertebral arteries. Left:  50-80% stenosis of the internal carotid artery based on velocity criteria,  however this may be secondary to the recent endarterectomy. There is no  significant plaque visualized. <50% stenosis of the common carotid artery. <50% stenosis of the external carotid artery. Unremarkable subclavian and vertebral arteries.       CT 10/2016  Severe, focal stenosis of the left internal carotid artery bulb, 95% diameter. Mild narrowing of the petrous segment of the right carotid artery, 20%   diameter. Moderate sinusitis of the paranasal sinuses. Cath 8/2017  LM 20%  LAD prox stent patent, mid 40%  Cx 20%              OM1 95%              OM2 90%  RCA 40%              RPDA 99% ostium              RPL 50% ostium and distal  LVEF 55%  Normal right heart pressures     PTCA OM1              2.0 x 15 angiosculpt cutting balloon  PTCA OM3              2.0 x 6 cutting balloon  PTCA RPDA              2.0 x 6 cutting balloon    Echo 5/2018  Left ventricular cavity size is normal. Normal left ventricular wall  thickness. Left ventricular function is low normal with ejection fraction  estimated at 45-50%. Posterolateral wall appears hypokinetic. Diastolic  filling parameters suggest grade I diastolic dysfunction. Thickening of leaflets of mitral valve. Mitral annular calcification is  present. Mild mitral regurgitation is present. The left atrium is dilated. Aortic valve appears sclerotic but opens adequately. Mild to moderate tricuspid regurgitation. Estimated pulmonary artery  systolic pressure is at 40 mmHg     Right and left heart cath 5/31/18  LM 20%  LAD 30% prox in stent  Cx 95% prox extending into OM1              OM1 95% ostium  RCA 50%              RPL 50%  LVEF 40%  RA 4,  RV 23/2,  PA 22/5/11,  W 2     PTCA Cx/OM1              2.0 x 12 JOANNA  Low right heart pressures noted assoc w/ hypotension. Hold torsemide. Reduce to 20 mg daily at discharge. EKG 3/4/2021  Normal sinus rhythm  Left bundle branch block  Abnormal ECG    CT chest 34/2/2021  Impression 1. No evidence of pulmonary embolism. 2. Findings consistent with interstitial fibrosis. Assessment:   1. Hyperlipidemia: stable. Gets checked thru PCP. 2. HTN (hypertension):  -- adequate control   3. CAD- PTCA Cx/OM1, 2.0 x 12 JOANNA   4. Chest pain - no recurrence post PCI 8/2017. remains stable   6.   Carotid stenosis - s/p

## 2021-03-19 ENCOUNTER — HOSPITAL ENCOUNTER (OUTPATIENT)
Age: 81
Discharge: HOME | End: 2021-03-19
Payer: COMMERCIAL

## 2021-03-19 ENCOUNTER — OFFICE VISIT (OUTPATIENT)
Dept: CARDIOLOGY CLINIC | Age: 81
End: 2021-03-19
Payer: COMMERCIAL

## 2021-03-19 VITALS
HEART RATE: 71 BPM | SYSTOLIC BLOOD PRESSURE: 134 MMHG | BODY MASS INDEX: 26.76 KG/M2 | OXYGEN SATURATION: 91 % | WEIGHT: 160.6 LBS | DIASTOLIC BLOOD PRESSURE: 86 MMHG | HEIGHT: 65 IN

## 2021-03-19 DIAGNOSIS — I65.23 BILATERAL CAROTID ARTERY STENOSIS: ICD-10-CM

## 2021-03-19 DIAGNOSIS — I50.32 CHRONIC DIASTOLIC CONGESTIVE HEART FAILURE (HCC): ICD-10-CM

## 2021-03-19 DIAGNOSIS — R06.02 SOB (SHORTNESS OF BREATH): ICD-10-CM

## 2021-03-19 DIAGNOSIS — I10 ESSENTIAL HYPERTENSION: ICD-10-CM

## 2021-03-19 DIAGNOSIS — E78.2 MIXED HYPERLIPIDEMIA: ICD-10-CM

## 2021-03-19 DIAGNOSIS — Z98.61 CAD S/P PERCUTANEOUS CORONARY ANGIOPLASTY: Primary | ICD-10-CM

## 2021-03-19 DIAGNOSIS — I25.110 CORONARY ARTERY DISEASE INVOLVING NATIVE CORONARY ARTERY OF NATIVE HEART WITH UNSTABLE ANGINA PECTORIS (HCC): ICD-10-CM

## 2021-03-19 DIAGNOSIS — I25.10 CAD S/P PERCUTANEOUS CORONARY ANGIOPLASTY: Primary | ICD-10-CM

## 2021-03-19 LAB
ANION GAP SERPL CALCULATED.3IONS-SCNC: 15 MMOL/L (ref 3–16)
BUN BLDV-MCNC: 23 MG/DL (ref 7–20)
CALCIUM SERPL-MCNC: 9.2 MG/DL (ref 8.3–10.6)
CHLORIDE BLD-SCNC: 94 MMOL/L (ref 99–110)
CO2: 30 MMOL/L (ref 21–32)
CREAT SERPL-MCNC: 0.9 MG/DL (ref 0.8–1.3)
GFR AFRICAN AMERICAN: >60
GFR NON-AFRICAN AMERICAN: >60
GLUCOSE BLD-MCNC: 266 MG/DL (ref 70–99)
POTASSIUM SERPL-SCNC: 4.7 MMOL/L (ref 3.5–5.1)
PRO-BNP: 1500 PG/ML (ref 0–449)
SODIUM BLD-SCNC: 139 MMOL/L (ref 136–145)

## 2021-03-19 PROCEDURE — 36415 COLL VENOUS BLD VENIPUNCTURE: CPT

## 2021-03-19 PROCEDURE — 80048 BASIC METABOLIC PNL TOTAL CA: CPT

## 2021-03-19 PROCEDURE — 83880 ASSAY OF NATRIURETIC PEPTIDE: CPT

## 2021-03-19 PROCEDURE — 99214 OFFICE O/P EST MOD 30 MIN: CPT | Performed by: INTERNAL MEDICINE

## 2021-03-19 NOTE — LETTER
Hyperlipidemia. Surgical History:   has a past surgical history that includes Cardiac surgery; eye surgery (Bilateral); Carotid endarterectomy (Left, 11/02/2016); and Coronary angioplasty with stent (05/31/2018). Social History:   reports that he has never smoked. He has never used smokeless tobacco. He reports that he does not drink alcohol or use drugs. Family History:  family history includes Heart Attack in his brother and brother; Heart Disease in his brother. Home Medications:  Prior to Admission medications    Medication Sig Start Date End Date Taking? Authorizing Provider   carvedilol (COREG) 6.25 MG tablet Take 1 tablet by mouth 2 times daily (with meals). 8/10/12  Yes Morgan Barrios MD   atorvastatin (LIPITOR) 20 MG tablet Take 1 tablet by mouth daily. 1/26/12 1/25/13 Yes Morgan Barrios MD   losartan (COZAAR) 50 MG tablet Take 50 mg by mouth daily. Yes Historical Provider, MD   clopidogrel (PLAVIX) 75 MG tablet Take 1 tablet by mouth daily. 10/27/11  Yes Morgan Barrios MD   metformin (GLUCOPHAGE) 1000 MG tablet Take 1 tablet by mouth 2 times daily (with meals). 10/28/11  Yes Edmond Proctor MD   aspirin 81 MG EC tablet Take 81 mg by mouth daily. Yes Historical Provider, MD   glyBURIDE (DIABETA) 5 MG tablet Take 5 mg by mouth 2 times daily (with meals). Yes Historical Provider, MD   ferrous sulfate 325 (65 FE) MG tablet Take 325 mg by mouth 2 times daily. Yes Historical Provider, MD   gabapentin (NEURONTIN) 600 MG tablet Take 600 mg by mouth 3 times daily. Yes Historical Provider, MD   therapeutic multivitamin-minerals (THERAGRAN-M) tablet Take 2 tablets by mouth daily. Yes Historical Provider, MD   nitroGLYCERIN (NITROSTAT) 0.4 MG SL tablet Place 1 tablet under the tongue every 5 minutes as needed. 11/8/11   Manisha Adams NP        Allergies:  Penicillins     Review of Systems:   · Constitutional: there has been no unanticipated weight loss.  There's been no change in energy level, sleep pattern, or activity level. · Eyes: No visual changes or diplopia. No scleral icterus. · ENT: No Headaches, hearing loss or vertigo. No mouth sores or sore throat. · Cardiovascular: Reviewed in HPI  · Respiratory: No cough or wheezing, no sputum production. No hematemesis. · Gastrointestinal: No abdominal pain, appetite loss, blood in stools. No change in bowel or bladder habits. · Genitourinary: No dysuria, trouble voiding, or hematuria. · Musculoskeletal:  No gait disturbance, weakness or joint complaints. · Integumentary: No rash or pruritis. · Neurological: No headache, diplopia, change in muscle strength, numbness or tingling. No change in gait, balance, coordination, mood, affect, memory, mentation, behavior. · Psychiatric: No anxiety, no depression. · Endocrine: No malaise, fatigue or temperature intolerance. No excessive thirst, fluid intake, or urination. No tremor. · Hematologic/Lymphatic: No abnormal bruising or bleeding, blood clots or swollen lymph nodes. · Allergic/Immunologic: No nasal congestion or hives. Physical Examination:    Vitals:    03/19/21 0948   BP: 134/86   Pulse: 71   SpO2: 91%        Constitutional and General Appearance: NAD   Respiratory:  · Normal excursion and expansion without use of accessory muscles  Resp Auscultation: rales in bases                Cardiovascular:  · The apical impulses not displaced  · Heart tones are crisp and normal  · Elev JVP  · The carotid bruit bilaterally L > R  · Normal S1S2, No S3, 1/6 systolic murmur  · Peripheral pulses are symmetrical and full  · There is no clubbing, cyanosis of the extremities.   · No edema  · Femoral Arteries: 2+ and equal  · Pedal Pulses: 2+ and equal   Abdomen:  · No masses or tenderness  · Liver/Spleen: No Abnormalities Noted  Neurological/Psychiatric:  · Alert and oriented in all spheres  · Moves all extremities well  · Exhibits normal gait balance and coordination  · No abnormalities of mood, affect, memory, mentation, or behavior are noted    Myoview 10/2013  There is an inferior fixed defect consistent with diaphragmatic attenuation. Normal LV function with ejection fraction of 68%. Abnormal EKG response. Similar to 2008 and 2009 studies. ECHO: 4/2016   Normal left ventricle size, wall thickness and systolic function with an   estimated ejection fraction of 55%. No regional wall motion abnormalities are seen.   Diastolic filling parameters suggests grade I diastolic dysfunction.   The aortic valve appears thickened/calcified with decreased leaflet mobility   but no significant stenosis by velocity measurements.   The aortic valve is tricuspid .   The maximum pressure gradient of 10 mmHg and a mean pressure gradient of 5 mmHg.   Trace aortic valve regurgitation.   MIld mitral and tricuspid regurgitation.   Systolic pulmonary artery pressure (SPAP) is normal and estimated at 27 mmHg (RA pressure 3 mmHg). Carotid 10/2016   -The right internal carotid artery appears to have a 1-15% diameter reducing    stenosis based on velocity criteria.    -The left internal carotid artery appears to have a 50-79% diameter reducing    stenosis based on velocity criteria, however ICA/CCA PSV ratio of 6.2    suggest nearing 80%.       Carotid dopplers 5/19/17  Right:  1-15% stenosis of the internal carotid artery based on velocity criteria and  B-Mode image. <50% stenosis of the common carotid artery. <50% stenosis of the external carotid artery. Unremarkable subclavian and vertebral arteries. Left:  50-80% stenosis of the internal carotid artery based on velocity criteria,  however this may be secondary to the recent endarterectomy. There is no  significant plaque visualized. <50% stenosis of the common carotid artery. <50% stenosis of the external carotid artery. Unremarkable subclavian and vertebral arteries.       CT 10/2016  Severe, focal stenosis of the left internal carotid artery bulb, 95% diameter. Mild narrowing of the petrous segment of the right carotid artery, 20%   diameter. Moderate sinusitis of the paranasal sinuses. Cath 8/2017  LM 20%  LAD prox stent patent, mid 40%  Cx 20%              OM1 95%              OM2 90%  RCA 40%              RPDA 99% ostium              RPL 50% ostium and distal  LVEF 55%  Normal right heart pressures     PTCA OM1              2.0 x 15 angiosculpt cutting balloon  PTCA OM3              2.0 x 6 cutting balloon  PTCA RPDA              2.0 x 6 cutting balloon    Echo 5/2018  Left ventricular cavity size is normal. Normal left ventricular wall  thickness. Left ventricular function is low normal with ejection fraction  estimated at 45-50%. Posterolateral wall appears hypokinetic. Diastolic  filling parameters suggest grade I diastolic dysfunction. Thickening of leaflets of mitral valve. Mitral annular calcification is  present. Mild mitral regurgitation is present. The left atrium is dilated. Aortic valve appears sclerotic but opens adequately. Mild to moderate tricuspid regurgitation. Estimated pulmonary artery  systolic pressure is at 40 mmHg     Right and left heart cath 5/31/18  LM 20%  LAD 30% prox in stent  Cx 95% prox extending into OM1              OM1 95% ostium  RCA 50%              RPL 50%  LVEF 40%  RA 4,  RV 23/2,  PA 22/5/11,  W 2     PTCA Cx/OM1              2.0 x 12 JOANNA  Low right heart pressures noted assoc w/ hypotension. Hold torsemide. Reduce to 20 mg daily at discharge. EKG 3/4/2021  Normal sinus rhythm  Left bundle branch block  Abnormal ECG    CT chest 34/2/2021  Impression 1. No evidence of pulmonary embolism. 2. Findings consistent with interstitial fibrosis. Assessment:   1. Hyperlipidemia: stable. Gets checked thru PCP. 2. HTN (hypertension):  -- adequate control   3. CAD- PTCA Cx/OM1, 2.0 x 12 JOANNA   4. Chest pain - no recurrence post PCI 8/2017. remains stable   6.   Carotid stenosis - s/p L-CEA

## 2021-03-22 ENCOUNTER — TELEPHONE (OUTPATIENT)
Dept: CARDIOLOGY CLINIC | Age: 81
End: 2021-03-22

## 2021-03-22 ENCOUNTER — CARE COORDINATION (OUTPATIENT)
Dept: CARE COORDINATION | Age: 81
End: 2021-03-22

## 2021-03-22 NOTE — TELEPHONE ENCOUNTER
----- Message from Addis Cueva MD sent at 3/22/2021  2:33 PM EDT -----  Call  Labs look ok  Test for CHF looks improved  Cont current plan

## 2021-03-22 NOTE — CARE COORDINATION
Patient contacted regarding COVID-19 risk and screening.  contacted the family by telephone to perform follow-up call. Verified name and  with family as identifiers. Symptoms reviewed with family. Patient reports symptoms are improving. Due to no new or worsening symptoms the RN CTN/ACM was not notified for escalation. Spoke to son, Nurys Zazueta who reports that pt is doing \"fine\" and has followed up with Cardiologist, Dr. Romie Sol and has made appointments for Echo and stress test. Son thanked author for calling to follow back up. This author reviewed discharge instructions, medical action plan and red flags such as increased shortness of breath, increasing fever, worsening cough or chest pain with family who verbalized understanding. Discussed exposure protocols and quarantine with CDC Guidelines What To Do If You Are Sick    Family who was given an opportunity for questions and concerns. The family agrees to contact the [facility] exposure line [phone], [facility] [address]: ([phone])[facility] PCP office for questions related to their healthcare. Author provided contact information for future reference.

## 2021-03-23 ENCOUNTER — OFFICE VISIT (OUTPATIENT)
Dept: ENT CLINIC | Age: 81
End: 2021-03-23
Payer: COMMERCIAL

## 2021-03-23 ENCOUNTER — OFFICE VISIT (OUTPATIENT)
Dept: PULMONOLOGY | Age: 81
End: 2021-03-23
Payer: COMMERCIAL

## 2021-03-23 ENCOUNTER — TELEPHONE (OUTPATIENT)
Dept: PULMONOLOGY | Age: 81
End: 2021-03-23

## 2021-03-23 VITALS
HEIGHT: 65 IN | BODY MASS INDEX: 26.66 KG/M2 | WEIGHT: 160 LBS | OXYGEN SATURATION: 98 % | DIASTOLIC BLOOD PRESSURE: 76 MMHG | SYSTOLIC BLOOD PRESSURE: 126 MMHG | HEART RATE: 65 BPM

## 2021-03-23 VITALS — HEART RATE: 109 BPM | SYSTOLIC BLOOD PRESSURE: 120 MMHG | DIASTOLIC BLOOD PRESSURE: 75 MMHG | TEMPERATURE: 97.5 F

## 2021-03-23 DIAGNOSIS — J39.2 THROAT DRYNESS: ICD-10-CM

## 2021-03-23 DIAGNOSIS — J84.10 PULMONARY FIBROSIS (HCC): Primary | ICD-10-CM

## 2021-03-23 DIAGNOSIS — I25.10 CORONARY ARTERY DISEASE INVOLVING NATIVE HEART WITHOUT ANGINA PECTORIS, UNSPECIFIED VESSEL OR LESION TYPE: ICD-10-CM

## 2021-03-23 DIAGNOSIS — H93.13 SUBJECTIVE TINNITUS OF BOTH EARS: ICD-10-CM

## 2021-03-23 DIAGNOSIS — R68.2 DRY MOUTH: ICD-10-CM

## 2021-03-23 DIAGNOSIS — H91.93 BILATERAL HEARING LOSS, UNSPECIFIED HEARING LOSS TYPE: Primary | ICD-10-CM

## 2021-03-23 DIAGNOSIS — R06.09 DOE (DYSPNEA ON EXERTION): ICD-10-CM

## 2021-03-23 PROCEDURE — 99204 OFFICE O/P NEW MOD 45 MIN: CPT | Performed by: INTERNAL MEDICINE

## 2021-03-23 PROCEDURE — 99203 OFFICE O/P NEW LOW 30 MIN: CPT | Performed by: OTOLARYNGOLOGY

## 2021-03-23 ASSESSMENT — ENCOUNTER SYMPTOMS
WHEEZING: 0
SHORTNESS OF BREATH: 1
ABDOMINAL PAIN: 0
RHINORRHEA: 0
SORE THROAT: 0
COUGH: 1
BLOOD IN STOOL: 0
SORE THROAT: 1
STRIDOR: 0
APNEA: 0
CONSTIPATION: 0
ABDOMINAL DISTENTION: 0
SINUS PAIN: 0
DIARRHEA: 0
CHEST TIGHTNESS: 0
CHOKING: 0
ANAL BLEEDING: 0
VOICE CHANGE: 0
RHINORRHEA: 0
SINUS PRESSURE: 0

## 2021-03-23 NOTE — PROGRESS NOTES
(DEMADEX) 100 MG tablet TAKE ONE-HALF TABLET BY MOUTH DAILY 30 tablet 5    insulin glargine (BASAGLAR KWIKPEN) 100 UNIT/ML injection pen Inject 60 Units into the skin nightly      dapagliflozin (FARXIGA) 5 MG tablet Take 5 mg by mouth every morning      amitriptyline (ELAVIL) 25 MG tablet Take 25 mg by mouth nightly      losartan (COZAAR) 25 MG tablet TAKE ONE TABLET BY MOUTH DAILY 90 tablet 3    clopidogrel (PLAVIX) 75 MG tablet Take 1 tablet by mouth daily 90 tablet 3    torsemide (DEMADEX) 10 MG tablet Take 1 tablet by mouth as needed (daily prn) 90 tablet 3    torsemide (DEMADEX) 100 MG tablet Take 1 tablet by mouth daily 30 tablet 0    INVOKANA 100 MG TABS tablet       tolterodine (DETROL) 2 MG tablet Take 2 mg by mouth 2 times daily      Dulaglutide (TRULICITY) 6.60 SH/1.1NP SOPN Inject into the skin      famotidine (PEPCID) 20 MG tablet Take 20 mg by mouth 2 times daily      nitroGLYCERIN (NITROSTAT) 0.4 MG SL tablet Place 1 tablet under the tongue every 5 minutes as needed for Chest pain (No more than 3 tablets in a 15 minute period. ). 25 tablet 2    glucose blood VI test strips (ASCENSIA AUTODISC VI;ONE TOUCH ULTRA TEST VI) strip 1 each by Does not apply route daily. As needed.  metformin (GLUCOPHAGE) 1000 MG tablet Take 1 tablet by mouth 2 times daily (with meals). (Patient taking differently: Take 500 mg by mouth 2 times daily (with meals) ) 90 tablet 3    aspirin 81 MG EC tablet Take 81 mg by mouth daily.  ferrous sulfate 325 (65 FE) MG tablet Take 325 mg by mouth 2 times daily.  gabapentin (NEURONTIN) 600 MG tablet Take 600 mg by mouth 2 times daily. Aicha Greaser therapeutic multivitamin-minerals (THERAGRAN-M) tablet Take 2 tablets by mouth daily.          Immunization History   Administered Date(s) Administered    COVID-19, Pfizer, AZAR, 30mcg/0.3mL 02/25/2021, 03/18/2021    Influenza Virus Vaccine 10/26/2011    Pneumococcal Polysaccharide ([id number]) 10/26/2011       Past Medical History:   Diagnosis Date    [facility]     CAD (coronary artery disease)     CH[facility]congestive [facility] failure[facility]     Diabetes mellitus [facility]     HTN (hypertension) 10/26/2011    Hyperlipidemia 10/26/2011     Past Surgical History:   Procedure Laterality Date    CARDIAC SURGERY      stent  x1   2008    CAROTID ENDARTERECTOMY Left 11/02/2016    LEFT CAROTID ENDARTERECTOMY WITH PATCH ANGIOPLASTY    CORONARY ANGIOPLASTY WITH STENT PLACEMENT  05/31/2018    EYE SURGERY Bilateral     cataracts w/lens     Family History   Problem Relation Age of Onset    Heart Attack Brother     Heart Disease Brother     Heart Attack Brother        Review of Systems:  Review of Systems   Constitutional: Positive for fatigue. Negative for activity change, appetite change and fever. HENT: Negative for congestion, ear discharge, ear pain, postnasal drip, rhinorrhea, sinus pressure, sneezing, sore throat, tinnitus and voice change. Respiratory: Positive for cough and shortness of breath. Negative for apnea, choking, chest tightness, wheezing and stridor. Cardiovascular: Negative for chest pain, palpitations and leg swelling. Gastrointestinal: Negative for abdominal distention, abdominal pain, anal bleeding, blood in stool, constipation and diarrhea. Musculoskeletal: Negative for arthralgias. Skin: Negative for pallor and rash. Allergic/Immunologic: Negative for environmental allergies. Neurological: Negative for dizziness, tremors, seizures, syncope, speech difficulty, weakness, light-headedness, numbness and headaches. Hematological: Negative for adenopathy. Does not bruise/bleed easily. Psychiatric/Behavioral: Negative for sleep disturbance. Vitals:    03/23/21 1621   BP: 126/76   Pulse: 65   SpO2: 98%   Weight: 160 lb (72.6 kg)   Height: 5' 5\" (1.651 m)     No flowsheet data found. Body mass index is 26.63 kg/m².      Wt Readings from Last 3 Encounters:   03/23/21 160 lb (72.6 kg)   03/19/21 160 lb 9.6 oz (72.8 kg)   03/05/21 159 lb (72.1 kg)     BP Readings from Last 3 Encounters:   03/23/21 126/76   03/23/21 120/75   03/19/21 134/86         Physical Exam  Constitutional:       General: He is not in acute distress. Appearance: He is well-developed. He is not diaphoretic. HENT:      Mouth/Throat:      Pharynx: No oropharyngeal exudate. Cardiovascular:      Rate and Rhythm: Normal rate and regular rhythm. Heart sounds: Normal heart sounds. No murmur. Pulmonary:      Effort: No respiratory distress. Breath sounds: Normal breath sounds. No wheezing or rales. Chest:      Chest wall: No tenderness. Abdominal:      General: There is no distension. Palpations: There is no mass. Tenderness: There is no abdominal tenderness. There is no guarding or rebound. Musculoskeletal:         General: No swelling, tenderness or deformity. Skin:     Coloration: Skin is not pale. Findings: No erythema or rash. Neurological:      Mental Status: He is alert and oriented to person, place, and time. Cranial Nerves: No cranial nerve deficit. Motor: No abnormal muscle tone. Coordination: Coordination normal.      Deep Tendon Reflexes: Reflexes normal.             Health Maintenance   Topic Date Due    DTaP/Tdap/Td vaccine (1 - Tdap) Never done    Shingles Vaccine (1 of 2) Never done    Lipid screen  08/18/2018    Annual Wellness Visit (AWV)  Never done    Flu vaccine (1) 09/01/2020    Potassium monitoring  03/19/2022    Creatinine monitoring  03/19/2022    Pneumococcal 65+ years Vaccine  Completed    COVID-19 Vaccine  Completed    Hepatitis A vaccine  Aged Out    Hib vaccine  Aged Out    Meningococcal (ACWY) vaccine  Aged Out          Assessment/Plan:    LEWIS  Pulmonary fibrosis  CAD/CHF    Agree that patient's dyspnea is likely to be related to worsening pulmonary fibrosis.   Patient also has history of CHF-recent noncompliance with torsemide noted in the chart, but however proBNP has significantly improved with recommencement of treatment. Reviewed patient CTA chest performed on 3/4, which shows evidence of pulmonary fibrosis-no clear evidence of honeycombing, though fibrotic changes are more pronounced at both bases. Will obtain high-resolution CT imaging in order to evaluate for the nature of the fibrosis ? IPF. No prior CT scans for comparison. Will also obtain autoimmune panel/ANCA/hypersensitivity pneumonitis panel to rule out various autoimmune disorders. We will also order complete PFT study and 6-minute walk test.    Patient likely has undiagnosed IPF-will await for high-resolution CT scan for confirmation. Might benefit from antifibrotic therapy/TK inhibitor such as pirfenidone/nintedanib, which we will discuss during next visit. Discussed plan with patient and son in the presence of . Return in about 3 weeks (around 4/13/2021).

## 2021-03-23 NOTE — PATIENT INSTRUCTIONS
· Return to see Clemente Duron for hearing testing and hearing aids. · Return for follow up after the hearing testing. · Most hearing aid users need ear cleaning and wax removal every 6-12 months. You may return here for that purpose. · NO Q-TIPS IN THE EARS  You should never clean your ears with a Q-tip, cotton tipped applicator, Natalie pin, paper clip, or any other instrument. I recommend only use of one the several ear wax removal kits available \"over the counter\" (eg. [employer]ch and Lomb or Murine, or [employer]) if you feel a need to try to remove ear wax. No other methods should be self used for this purpose as there is danger of injury to the ear and risk of irreparable and irreversible permanent hearing loss.

## 2021-03-23 NOTE — TELEPHONE ENCOUNTER
Please order complete PFT study and 6 minute walk test for this patient for evaluation of Dyspnea. This can be done at the same time as the HRCT. Please talk to patient's son about this.

## 2021-03-23 NOTE — PROGRESS NOTES
[facility]       NEW PATIENT VISIT      PCP:  Rosa Hill MD      REFERRED BY:   Dr. Mychal Mazariegos  Chief Complaint   Patient presents with    Hearing Loss    Tinnitus       HISTORY OF PRESENT ILLNESS       Billy Sahu is a [de-identified] y.o. male here with his son, Moustapha Stephens, for evaluation and treatment of hearing loss and tinnitus. His sunrise stated that Mr. Rita Arthur is suffering from hearing loss and tinnitus. He had a hearing test about two years ago which showed a hearing loss. \"He got hearing aids, but they did not work well, he did not enjoy them and most of the time he did not wear them. \"  Moustapha Stephens also reported that Mr. Rita Arthur is also suffering from \"a throat problem, with itchy throat and dryness. \"      REVIEW OF SYSTEMS   Review of Systems   Constitutional: Negative for chills and fever. HENT: Positive for hearing loss and sore throat (some due to dryness). Negative for ear discharge, ear pain, rhinorrhea and sinus pain. PAST MEDICAL HISTORY    Past Medical History:   Diagnosis Date    Acute MI ([address]     CAD (coronary artery disease)     CHF (congestive heart failure), NYHA class III ([address]     Diabetes mellitus ([address]     HTN (hypertension) 10/26/2011    Hyperlipidemia 10/26/2011         Past Surgical History:   Procedure Laterality Date    CARDIAC SURGERY      stent  x1   2008    CAROTID ENDARTERECTOMY Left 11/02/2016    LEFT CAROTID ENDARTERECTOMY WITH PATCH ANGIOPLASTY    CORONARY ANGIOPLASTY WITH STENT PLACEMENT  05/31/2018    EYE SURGERY Bilateral     cataracts w/lens         EXAMINATION      Vitals:    03/23/21 1437   BP: 120/75   Pulse: 109   Temp: 97.5 °F (36.4 °C)       Physical Exam  Vitals signs reviewed. Constitutional:       General: He is awake. He is not in acute distress. Appearance: Normal appearance. He is well-developed. He is not ill-appearing or toxic-appearing.    HENT: Head: Normocephalic and atraumatic. Salivary Glands: Right salivary gland is not diffusely enlarged or tender. Left salivary gland is not diffusely enlarged or tender. Right Ear: Tympanic membrane, ear canal and external ear normal. No decreased hearing noted. No drainage, swelling or tenderness. No middle ear effusion. There is no impacted cerumen. Tympanic membrane is not injected, scarred, perforated, erythematous, retracted or bulging. Tympanic membrane has normal mobility. Left Ear: Tympanic membrane, ear canal and external ear normal. No decreased hearing noted. No drainage, swelling or tenderness. No middle ear effusion. There is no impacted cerumen. Tympanic membrane is not injected, scarred, perforated, erythematous, retracted or bulging. Tympanic membrane has normal mobility. Ears:      Comments: Otomicroscopy was performed bilaterally. The right and left tympanic membranes were dull and thickened. Nose: Nose normal. No nasal deformity, septal deviation, mucosal edema, congestion or rhinorrhea. Right Turbinates: Not enlarged. Left Turbinates: Not enlarged. Right Sinus: No maxillary sinus tenderness or frontal sinus tenderness. Left Sinus: No maxillary sinus tenderness or frontal sinus tenderness. Mouth/Throat:      Lips: Pink. No lesions. Mouth: Mucous membranes are dry. No oral lesions. Tongue: No lesions. Palate: No mass and lesions. Pharynx: Oropharynx is clear. Uvula midline. No oropharyngeal exudate or posterior oropharyngeal erythema. Tonsils: No tonsillar exudate or tonsillar abscesses. Neck:      Musculoskeletal: Normal range of motion and neck supple. No neck rigidity or muscular tenderness. Thyroid: No thyroid mass, thyromegaly or thyroid tenderness. Trachea: No tracheal deviation. Lymphadenopathy:      Cervical: No cervical adenopathy. Neurological:      Mental Status: He is alert.            IMPRESSION / DIAGNOSES / Leslie Leger was seen today for hearing loss and tinnitus. Diagnoses and all orders for this visit:    Bilateral hearing loss, unspecified hearing loss type    Subjective tinnitus of both ears    Throat dryness    Dry mouth             RECOMMENDATIONS/PLAN      1. I recommended use of biotin or other over-the-counter dry mouth remedies including lozenges. If this is not effective, can try Salagen. 2. Patient was advised to return to see Dr. Leda Bravo for hearing testing and hearing aid evaluation. 3. Return for recheck/follow-up after hearing test.        Patient Instructions   · Return to see [facility] for hearing testing and hearing aids. · Return for follow up after the hearing testing. · Most hearing aid users need ear cleaning and wax removal every 6-12 months. You may return here for that purpose. · NO Q-TIPS IN THE EARS  You should never clean your ears with a Q-tip, cotton tipped applicator, Natalie pin, paper clip, or any other instrument. I recommend only use of one the several ear wax removal kits available \"over the counter\" (eg. [employer]ch and Lomb or Murine, or [employer]) if you feel a need to try to remove ear wax. No other methods should be self used for this purpose as there is danger of injury to the ear and risk of irreparable and irreversible permanent hearing loss.               MEDICAL DECISION MAKING    # and complexity of problems addressed:  70433 - Moderate  2 or more stable chronic illnesses    Amount and/or Complexity of Data to be Reviewed and Analyzed  71906 - Straightforward  no data or only 1 test or document reviewed or ordered    Risk of Complications and /or Morbidity or Mortality of Patient Management  72218 - Low   OTC drugs

## 2021-03-25 DIAGNOSIS — R06.02 SOB (SHORTNESS OF BREATH): Primary | ICD-10-CM

## 2021-04-05 ENCOUNTER — OFFICE VISIT (OUTPATIENT)
Dept: PRIMARY CARE CLINIC | Age: 81
End: 2021-04-05
Payer: COMMERCIAL

## 2021-04-05 DIAGNOSIS — Z20.828 EXPOSURE TO SARS-ASSOCIATED CORONAVIRUS: Primary | ICD-10-CM

## 2021-04-05 LAB — SARS-COV-2: NOT DETECTED

## 2021-04-05 PROCEDURE — 99211 OFF/OP EST MAY X REQ PHY/QHP: CPT | Performed by: NURSE PRACTITIONER

## 2021-04-05 NOTE — PATIENT INSTRUCTIONS

## 2021-04-09 ENCOUNTER — HOSPITAL ENCOUNTER (OUTPATIENT)
Dept: PULMONOLOGY | Age: 81
Discharge: HOME | End: 2021-04-09
Payer: COMMERCIAL

## 2021-04-09 ENCOUNTER — HOSPITAL ENCOUNTER (OUTPATIENT)
Dept: NON INVASIVE DIAGNOSTICS | Age: 81
Discharge: HOME | End: 2021-04-09
Payer: COMMERCIAL

## 2021-04-09 ENCOUNTER — HOSPITAL ENCOUNTER (OUTPATIENT)
Dept: CT IMAGING | Age: 81
Discharge: HOME | End: 2021-04-09
Payer: COMMERCIAL

## 2021-04-09 ENCOUNTER — HOSPITAL ENCOUNTER (OUTPATIENT)
Age: 81
Discharge: HOME | End: 2021-04-09
Payer: COMMERCIAL

## 2021-04-09 VITALS — RESPIRATION RATE: 16 BRPM | HEART RATE: 96 BPM | OXYGEN SATURATION: 97 %

## 2021-04-09 DIAGNOSIS — R06.02 SOB (SHORTNESS OF BREATH): ICD-10-CM

## 2021-04-09 DIAGNOSIS — J84.10 PULMONARY FIBROSIS (HCC): ICD-10-CM

## 2021-04-09 DIAGNOSIS — I10 ESSENTIAL HYPERTENSION: ICD-10-CM

## 2021-04-09 DIAGNOSIS — I50.32 CHRONIC DIASTOLIC CONGESTIVE HEART FAILURE (HCC): ICD-10-CM

## 2021-04-09 DIAGNOSIS — I25.110 CORONARY ARTERY DISEASE INVOLVING NATIVE CORONARY ARTERY OF NATIVE HEART WITH UNSTABLE ANGINA PECTORIS (HCC): ICD-10-CM

## 2021-04-09 LAB
DLCO %PRED: 46 %
DLCO PRED: NORMAL
DLCO/VA %PRED: NORMAL
DLCO/VA PRED: NORMAL
DLCO/VA: NORMAL
DLCO: NORMAL
EXPIRATORY TIME-POST: NORMAL
EXPIRATORY TIME: NORMAL
FEF 25-75% %CHNG: NORMAL
FEF 25-75% %PRED-POST: NORMAL
FEF 25-75% %PRED-PRE: NORMAL
FEF 25-75% PRED: NORMAL
FEF 25-75%-POST: NORMAL
FEF 25-75%-PRE: NORMAL
FEV1 %PRED-POST: 66 %
FEV1 %PRED-PRE: 62 %
FEV1 PRED: NORMAL
FEV1-POST: NORMAL
FEV1-PRE: NORMAL
FEV1/FVC %PRED-POST: NORMAL
FEV1/FVC %PRED-PRE: NORMAL
FEV1/FVC PRED: NORMAL
FEV1/FVC-POST: 120 %
FEV1/FVC-PRE: 116 %
FVC %PRED-POST: NORMAL
FVC %PRED-PRE: NORMAL
FVC PRED: NORMAL
FVC-POST: NORMAL
FVC-PRE: NORMAL
GAW %PRED: NORMAL
GAW PRED: NORMAL
GAW: NORMAL
IC %PRED: NORMAL
IC PRED: NORMAL
IC: NORMAL
LV EF: 20 %
LVEF MODALITY: NORMAL
MEP: NORMAL
MIP: NORMAL
MVV %PRED-PRE: NORMAL
MVV PRED: NORMAL
MVV-PRE: NORMAL
PEF %PRED-POST: NORMAL
PEF %PRED-PRE: NORMAL
PEF PRED: NORMAL
PEF%CHNG: NORMAL
PEF-POST: NORMAL
PEF-PRE: NORMAL
RAW %PRED: NORMAL
RAW PRED: NORMAL
RAW: NORMAL
RHEUMATOID FACTOR: <10 IU/ML
RV %PRED: NORMAL
RV PRED: NORMAL
RV: NORMAL
SEDIMENTATION RATE, ERYTHROCYTE: 67 MM/HR (ref 0–20)
SVC %PRED: NORMAL
SVC PRED: NORMAL
SVC: NORMAL
TLC %PRED: 54 %
TLC PRED: NORMAL
TLC: NORMAL
VA %PRED: NORMAL
VA PRED: NORMAL
VA: NORMAL
VTG %PRED: NORMAL
VTG PRED: NORMAL
VTG: NORMAL

## 2021-04-09 PROCEDURE — 6370000000 HC RX 637 (ALT 250 FOR IP): Performed by: INTERNAL MEDICINE

## 2021-04-09 PROCEDURE — 94729 DIFFUSING CAPACITY: CPT

## 2021-04-09 PROCEDURE — 86431 RHEUMATOID FACTOR QUANT: CPT

## 2021-04-09 PROCEDURE — 83516 IMMUNOASSAY NONANTIBODY: CPT

## 2021-04-09 PROCEDURE — 86606 ASPERGILLUS ANTIBODY: CPT

## 2021-04-09 PROCEDURE — 86005 ALLG SPEC IGE MULTIALLG SCR: CPT

## 2021-04-09 PROCEDURE — 94726 PLETHYSMOGRAPHY LUNG VOLUMES: CPT

## 2021-04-09 PROCEDURE — 85652 RBC SED RATE AUTOMATED: CPT

## 2021-04-09 PROCEDURE — 86331 IMMUNODIFFUSION OUCHTERLONY: CPT

## 2021-04-09 PROCEDURE — 86038 ANTINUCLEAR ANTIBODIES: CPT

## 2021-04-09 PROCEDURE — 86200 CCP ANTIBODY: CPT

## 2021-04-09 PROCEDURE — 93306 TTE W/DOPPLER COMPLETE: CPT

## 2021-04-09 PROCEDURE — 94060 EVALUATION OF WHEEZING: CPT

## 2021-04-09 PROCEDURE — 71250 CT THORAX DX C-: CPT

## 2021-04-09 PROCEDURE — 36415 COLL VENOUS BLD VENIPUNCTURE: CPT

## 2021-04-09 PROCEDURE — 86003 ALLG SPEC IGE CRUDE XTRC EA: CPT

## 2021-04-09 PROCEDURE — 94200 LUNG FUNCTION TEST (MBC/MVV): CPT

## 2021-04-09 PROCEDURE — 94760 N-INVAS EAR/PLS OXIMETRY 1: CPT

## 2021-04-09 PROCEDURE — 94618 PULMONARY STRESS TESTING: CPT

## 2021-04-09 RX ORDER — ALBUTEROL SULFATE 90 UG/1
4 AEROSOL, METERED RESPIRATORY (INHALATION) ONCE
Status: COMPLETED | OUTPATIENT
Start: 2021-04-09 | End: 2021-04-09

## 2021-04-09 RX ADMIN — Medication 4 PUFF: at 12:46

## 2021-04-09 ASSESSMENT — PULMONARY FUNCTION TESTS
FEV1/FVC_POST: 120
FEV1/FVC_PRE: 116
FEV1_PERCENT_PREDICTED_PRE: 62
FEV1_PERCENT_PREDICTED_POST: 66

## 2021-04-09 NOTE — PROGRESS NOTES
Consulted with Dr. Bre Crespo regarding low EF with abnormal wall motion. Patient had already gone home. Patient does not speak english. Spoke with son on phone regarding low EF and instructed(per Dr. Bre Crespo) him to monitor oxygen saturation as well as patient well being. Also instructed to call or go to ER with any changes in status. Son stated that he will and always does monitor patient's O2 sats and condition and that he will call or go to ER with any changes.

## 2021-04-10 LAB
ANTI-NUCLEAR ANTIBODY (ANA): NEGATIVE
CYCLIC CITRULLINATED PEPTIDE ANTIBODY IGG: 0.7 U/ML (ref 0–2.9)

## 2021-04-11 LAB
MYELOPEROXIDASE AB: 2 AU/ML (ref 0–19)
SERINE PROTEASE 3 AB: 6 AU/ML (ref 0–19)

## 2021-04-12 ENCOUNTER — TELEPHONE (OUTPATIENT)
Dept: CARDIOLOGY CLINIC | Age: 81
End: 2021-04-12

## 2021-04-12 NOTE — TELEPHONE ENCOUNTER
----- Message from Lorri Kessler MD sent at 4/12/2021  2:03 PM EDT -----  The patient's echocardiogram suggests progression in their heart disease. His left ventricular function has declined significantly since our last assessment a couple years ago. Patient should schedule an appointment to see Dr. Villa Early to discuss optimization of his medical regimen, referral to advanced heart failure, and possibility of another heart catheterization.

## 2021-04-12 NOTE — TELEPHONE ENCOUNTER
Spoke with patient's son. Patient has stress test scheduled for tomorrow. He Is scheduled to see Trish Macedo next week.

## 2021-04-12 NOTE — PROCEDURES
Pulmonary Function Testing      Patient name:  Luci Medrano     [facility] Unit #:   [id number]   Date of test:  4/9/2021  Date of interpretation:   4/12/2021    Mr. Luci Medrano is a [de-identified]y.o. year-old non smoker. The spirometry data were acceptable and reproducible. Spirometry:  Flow volume loops were restricted. The FEV-1/FVC ratio was normal. The FEV-1 was 1.33 liters (62% of predicted), which was moderately decreased. The FVC was 1.6 liters (52% of predicted), which was decreased. Response to inhaled bronchodilators (albuterol) was not significant. Lung volumes:  Lung volumes were tested by plethysmography. The total lung capacity was 2.65 liters (54% of predicted), which was decreased. The residual volume was 1.02 liters (45% of predicted), which was decreased. The ratio of residual volume to total lung capacity (RV/TLC) was 39, which was normal.     Diffusion capacity was found to be 46% which is Moderately decreased. Six minute walk test: Pt could only walk 150 feet during the 6 minute walk test, very severe dyspnea noted during the test. O2 sats reached a sukumar of 89% on RA during the test.    Interpretation:  Restriction noted. Correlate for ILD +/- pulmonary vascular disease.     Comments:

## 2021-04-13 ENCOUNTER — TELEPHONE (OUTPATIENT)
Dept: CARDIOLOGY CLINIC | Age: 81
End: 2021-04-13

## 2021-04-13 ENCOUNTER — HOSPITAL ENCOUNTER (OUTPATIENT)
Dept: NON INVASIVE DIAGNOSTICS | Age: 81
Discharge: HOME | End: 2021-04-13
Payer: COMMERCIAL

## 2021-04-13 ENCOUNTER — OFFICE VISIT (OUTPATIENT)
Dept: PULMONOLOGY | Age: 81
End: 2021-04-13
Payer: COMMERCIAL

## 2021-04-13 VITALS
BODY MASS INDEX: 26.66 KG/M2 | DIASTOLIC BLOOD PRESSURE: 64 MMHG | SYSTOLIC BLOOD PRESSURE: 118 MMHG | WEIGHT: 160 LBS | OXYGEN SATURATION: 97 % | HEART RATE: 102 BPM | HEIGHT: 65 IN

## 2021-04-13 DIAGNOSIS — J84.112 IPF (IDIOPATHIC PULMONARY FIBROSIS) (HCC): Primary | ICD-10-CM

## 2021-04-13 DIAGNOSIS — I25.10 CORONARY ARTERY DISEASE INVOLVING NATIVE CORONARY ARTERY OF NATIVE HEART WITHOUT ANGINA PECTORIS: ICD-10-CM

## 2021-04-13 LAB
LV EF: 21 %
LVEF MODALITY: NORMAL

## 2021-04-13 PROCEDURE — 6360000002 HC RX W HCPCS: Performed by: INTERNAL MEDICINE

## 2021-04-13 PROCEDURE — 99214 OFFICE O/P EST MOD 30 MIN: CPT | Performed by: INTERNAL MEDICINE

## 2021-04-13 PROCEDURE — A9502 TC99M TETROFOSMIN: HCPCS | Performed by: INTERNAL MEDICINE

## 2021-04-13 PROCEDURE — 3430000000 HC RX DIAGNOSTIC RADIOPHARMACEUTICAL: Performed by: INTERNAL MEDICINE

## 2021-04-13 PROCEDURE — 93017 CV STRESS TEST TRACING ONLY: CPT | Performed by: INTERNAL MEDICINE

## 2021-04-13 PROCEDURE — 78452 HT MUSCLE IMAGE SPECT MULT: CPT | Performed by: INTERNAL MEDICINE

## 2021-04-13 RX ORDER — ALBUTEROL SULFATE 90 UG/1
2 AEROSOL, METERED RESPIRATORY (INHALATION) 4 TIMES DAILY PRN
Qty: 1 INHALER | Refills: 5 | Status: SHIPPED | OUTPATIENT
Start: 2021-04-13

## 2021-04-13 RX ORDER — AMINOPHYLLINE DIHYDRATE 25 MG/ML
100 INJECTION, SOLUTION INTRAVENOUS ONCE
Status: COMPLETED | OUTPATIENT
Start: 2021-04-13 | End: 2021-04-13

## 2021-04-13 RX ADMIN — TETROFOSMIN 30 MILLICURIE: 1.38 INJECTION, POWDER, LYOPHILIZED, FOR SOLUTION INTRAVENOUS at 10:09

## 2021-04-13 RX ADMIN — REGADENOSON 0.4 MG: 0.08 INJECTION, SOLUTION INTRAVENOUS at 10:01

## 2021-04-13 RX ADMIN — TETROFOSMIN 10 MILLICURIE: 1.38 INJECTION, POWDER, LYOPHILIZED, FOR SOLUTION INTRAVENOUS at 08:58

## 2021-04-13 RX ADMIN — AMINOPHYLLINE 100 MG: 25 INJECTION, SOLUTION INTRAVENOUS at 10:10

## 2021-04-13 ASSESSMENT — ENCOUNTER SYMPTOMS
WHEEZING: 0
STRIDOR: 0
VOICE CHANGE: 0
BACK PAIN: 0
BLOOD IN STOOL: 0
CONSTIPATION: 0
CHOKING: 0
CHEST TIGHTNESS: 0
ANAL BLEEDING: 0
ABDOMINAL DISTENTION: 0
ABDOMINAL PAIN: 0
SHORTNESS OF BREATH: 1
RHINORRHEA: 0
SINUS PRESSURE: 0
APNEA: 0
DIARRHEA: 0
SORE THROAT: 0
COUGH: 1

## 2021-04-13 NOTE — TELEPHONE ENCOUNTER
Spoke with pt, son, he says he would be willing to get his father scheduled for an angiogram if necessary? He wants to know if he can get him scheduled soon or if [facility] needs to review when he returns?

## 2021-04-13 NOTE — PROGRESS NOTES
Instructed on Lexiscan Stress Test Procedure including possible side effects/ adverse reactions. Patient verbalizes  understanding and denies having any questions . See [address] [place] [address] Cardiology

## 2021-04-13 NOTE — PROGRESS NOTES
Shirley Mail    YOB: 1940     Date of Service:  4/13/2021     Chief Complaint   Patient presents with    Results     CT, PFT, & LABS         HPI patient is here along with his son for the results of blood work, high-resolution CT scan and PFT study. Continues to be dyspneic with exertion. Has a chronic dry cough. No chest pain. Abnormal stress test was noted today. Allergies   Allergen Reactions    Penicillins Itching     Outpatient Medications Marked as Taking for the 4/13/21 encounter (Office Visit) with Agatha Merida MD   Medication Sig Dispense Refill    albuterol sulfate HFA (VENTOLIN HFA) 108 (90 Base) MCG/ACT inhaler Inhale 2 puffs into the lungs 4 times daily as needed for Wheezing 1 Inhaler 5    b complex vitamins capsule Take 1 capsule by mouth daily      carvedilol (COREG) 3.125 MG tablet TAKE ONE TABLET BY MOUTH TWICE A DAY WITH MEALS 180 tablet 0    atorvastatin (LIPITOR) 40 MG tablet TAKE ONE TABLET BY MOUTH DAILY 90 tablet 0    insulin glargine (BASAGLAR KWIKPEN) 100 UNIT/ML injection pen Inject 60 Units into the skin nightly      dapagliflozin (FARXIGA) 5 MG tablet Take 5 mg by mouth every morning      amitriptyline (ELAVIL) 25 MG tablet Take 25 mg by mouth nightly      losartan (COZAAR) 25 MG tablet TAKE ONE TABLET BY MOUTH DAILY 90 tablet 3    clopidogrel (PLAVIX) 75 MG tablet Take 1 tablet by mouth daily 90 tablet 3    torsemide (DEMADEX) 100 MG tablet Take 1 tablet by mouth daily 30 tablet 0    INVOKANA 100 MG TABS tablet       tolterodine (DETROL) 2 MG tablet Take 2 mg by mouth 2 times daily      Dulaglutide (TRULICITY) 0.19 PL/5.9OH SOPN Inject into the skin      famotidine (PEPCID) 20 MG tablet Take 20 mg by mouth 2 times daily      nitroGLYCERIN (NITROSTAT) 0.4 MG SL tablet Place 1 tablet under the tongue every 5 minutes as needed for Chest pain (No more than 3 tablets in a 15 minute period. ).  25 tablet 2    glucose blood VI test strips (ASCENSIA AUTODISC VI;ONE TOUCH ULTRA TEST VI) strip 1 each by Does not apply route daily. As needed.  metformin (GLUCOPHAGE) 1000 MG tablet Take 1 tablet by mouth 2 times daily (with meals). (Patient taking differently: Take 500 mg by mouth 2 times daily (with meals) ) 90 tablet 3    aspirin 81 MG EC tablet Take 81 mg by mouth daily.  ferrous sulfate 325 (65 FE) MG tablet Take 325 mg by mouth 2 times daily.  gabapentin (NEURONTIN) 600 MG tablet Take 600 mg by mouth 2 times daily. Angel Draper therapeutic multivitamin-minerals (THERAGRAN-M) tablet Take 2 tablets by mouth daily. Immunization History   Administered Date(s) Administered    COVID-19, Yoo Peter, PF, 30mcg/0.3mL 02/25/2021, 03/18/2021    Influenza Virus Vaccine 10/26/2011    Pneumococcal Polysaccharide ([id number]) 10/26/2011       Past Medical History:   Diagnosis Date    [facility]     CAD (coronary artery disease)     CHF (congestive heart failure), NYHA class III ([facility]     Diabetes mellitus ([facility]     HTN (hypertension) 10/26/2011    Hyperlipidemia 10/26/2011     Past Surgical History:   Procedure Laterality Date    CARDIAC SURGERY      stent  x1   2008    CAROTID ENDARTERECTOMY Left 11/02/2016    LEFT CAROTID ENDARTERECTOMY WITH PATCH ANGIOPLASTY    CORONARY ANGIOPLASTY WITH STENT PLACEMENT  05/31/2018    EYE SURGERY Bilateral     cataracts w/lens     Family History   Problem Relation Age of Onset    Heart Attack Brother     Heart Disease Brother     Heart Attack Brother        Review of Systems:  Review of Systems   Constitutional: Positive for fatigue. Negative for activity change, appetite change and fever. HENT: Negative for congestion, ear discharge, ear pain, postnasal drip, rhinorrhea, sinus pressure, sneezing, sore throat, tinnitus and voice change. Respiratory: Positive for cough and shortness of breath. Negative for apnea, choking, chest tightness, wheezing and stridor.     Cardiovascular: Negative for chest pain, palpitations and leg swelling. Gastrointestinal: Negative for abdominal distention, abdominal pain, anal bleeding, blood in stool, constipation and diarrhea. Musculoskeletal: Negative for arthralgias, back pain and gait problem. Skin: Negative for pallor and rash. Allergic/Immunologic: Negative for environmental allergies. Neurological: Negative for dizziness, tremors, seizures, syncope, speech difficulty, weakness, light-headedness, numbness and headaches. Hematological: Negative for adenopathy. Does not bruise/bleed easily. Psychiatric/Behavioral: Negative for sleep disturbance. Vitals:    04/13/21 1434   BP: 118/64   Pulse: 102   SpO2: 97%   Weight: 160 lb (72.6 kg)   Height: 5' 5\" (1.651 m)     No flowsheet data found. Body mass index is 26.63 kg/m². Wt Readings from Last 3 Encounters:   04/13/21 160 lb (72.6 kg)   03/23/21 160 lb (72.6 kg)   03/19/21 160 lb 9.6 oz (72.8 kg)     BP Readings from Last 3 Encounters:   04/13/21 118/64   03/23/21 126/76   03/23/21 120/75         Physical Exam  Constitutional:       General: He is not in acute distress. Appearance: He is well-developed. He is not diaphoretic. HENT:      Mouth/Throat:      Pharynx: No oropharyngeal exudate. Cardiovascular:      Rate and Rhythm: Normal rate and regular rhythm. Heart sounds: Normal heart sounds. No murmur. Pulmonary:      Effort: No respiratory distress. Breath sounds: Rales present. No wheezing. Chest:      Chest wall: No tenderness. Abdominal:      General: There is no distension. Palpations: There is no mass. Tenderness: There is no abdominal tenderness. There is no guarding or rebound. Musculoskeletal:         General: No swelling, tenderness or deformity. Skin:     Coloration: Skin is not pale. Findings: No erythema or rash. Neurological:      Mental Status: He is alert and oriented to person, place, and time. Cranial Nerves:  No cranial nerve deficit. Motor: No abnormal muscle tone. Coordination: Coordination normal.      Deep Tendon Reflexes: Reflexes normal.             Health Maintenance   Topic Date Due    DTaP/Tdap/Td vaccine (1 - Tdap) Never done    Shingles Vaccine (1 of 2) Never done    Lipid screen  08/18/2018    Annual Wellness Visit (AWV)  Never done    Flu vaccine (Season Ended) 09/01/2021    Potassium monitoring  03/19/2022    Creatinine monitoring  03/19/2022    Pneumococcal 65+ years Vaccine  Completed    COVID-19 Vaccine  Completed    Hepatitis A vaccine  Aged Out    Hib vaccine  Aged Out    Meningococcal (ACWY) vaccine  Aged Out          Assessment/Plan:    High resolution CT scan performed on 4/9 shows typical findings suggestive of IPF-bibasal subpleural reticular opacities with honeycombing. Rheumatologic work-up was fairly negative-except elevated ESR of 67. Patient would benefit from antifibrotic therapy such as pirfenidone or nintedanib-Will hold off, since patient is currently pursuing cardiac evaluation. 2D echocardiogram performed on 4/9 suggestive of significantly depressed EF of 20% with global hypokinesis. Nuclear stress test performed today suggestive of multiple fixed defects in the region of LAD and left circumflex. History of CAD status post stent in the past-now has worsening cardiac function. ? Plans for coronary angiogram.    Briefly explained treatment for IPF and side effects associated with medications. Will await cardiology work-up prior to instituting therapy. PFT from 4/9 suggestive of significant restriction, FVC 1.6 L [52% predicted], TLC 2.65 L [54% predicted and DLCO 46% predicted. Patient did not perform well on the 6-minute walk test-only able to walk 1 lap after which he had to rest.  Will commence patient on albuterol inhaler, which he would use as needed. Return in about 1 month (around 5/13/2021).

## 2021-04-13 NOTE — TELEPHONE ENCOUNTER
----- Message from Kelly Sheth MD sent at 4/13/2021 12:06 PM EDT -----  Please inform the patient that the test is abnormal.     Results were discussed with patient's son per  report.

## 2021-04-14 NOTE — TELEPHONE ENCOUNTER
Spoke with kaitlin, he will keep upcoming 4/23 appt with [facility] per [facility].

## 2021-04-14 NOTE — TELEPHONE ENCOUNTER
Pt has 4/23 appt with [facility] is that time frame ok?

## 2021-04-15 LAB
ALLERGEN BEEF: <0.1 KU/L
ALLERGEN PHOMA BETAE: <0.1 KU/L
ALLERGEN PORK: <0.1 KU/L
ALLERGEN SEE NOTE: ABNORMAL
ALLERGEN, FEATHER MIX: NEGATIVE KU/L
ASPERGILLUS FLAVUS ANTIBODIES: DETECTED
ASPERGILLUS FUMIGATUS #1: ABNORMAL
ASPERGILLUS FUMIGATUS #2: ABNORMAL
ASPERGILLUS FUMIGATUS #3: DETECTED
ASPERGILLUS FUMIGATUS #6: ABNORMAL
AUREOBASIDIUM PULLULANS: ABNORMAL
MICROPOLYSPORA FAENI: ABNORMAL
PIGEON SERUM ABS: ABNORMAL
SACCHAROMONOSPORA VIRIDIS: ABNORMAL
THERMOACTINOMYCES CANDIDUS AB: ABNORMAL
THERMOACTINOMYCES VULGARIS #1: ABNORMAL

## 2021-04-22 NOTE — PROGRESS NOTES
[facility]   Cardiac Followup    Referring Provider:  Timoteo Sales MD     Chief Complaint   Patient presents with    1 Month Follow-Up     Kayla Hargrove is a [de-identified] y.o. male who is here today for follow up for a history of coronary artery disease- left heart cath 2017 and then a repeat right and left cath 2018 with PTCA Cx/OM1 with 2.0 x 12 JOANNA, hypertension, hyperlipidemia, CHF and SOB. Echocardiogram from from 5/2018 showed an EF of 45-50%, mild to moderate tricuspid regurgitation. He presented to the ER yesterday (3/5/2021) with complaints of SOB and concerns by PCP for atrial fib. EKG showed NSR with LBBB, BNP 3,304, CT chest showed no evidence of PE, interstitial fibrosis. Patient had been visiting [facility] and returned home with SOB and hypoxia. COVID testing negative 3/9/2021. He was referred by pulmonary for an abnormal CT, LEWIS, and Pulmonary fibrosis. He was seen by Dr. Nadeem Hall MD. He underwent a PTT's with six minute walk test- Restriction noted. Correlate for ILD +/- pulmonary vascular   disease. Echocardiogram from 4/9/2021 showed an EF of 20%. Stress test on 4/13/2021 was abnormal.    Today he states continues to have SOB and is now is very SOB with stairs. His O2 Sats shows he is in the 80's with activity. No assoc chest pain. SOB a little better since last OV. Daily. He states he has no SOB at rest and his O2 Sats are 90s with he is not active. He will be following up with pulmonary in one week and is using an inhaler as needed for SOB and cough. He states he has palpitations that feels like a fluttering. He has had both COVID vaccines. Patient currently denies any weight gain, edema, chest pain, dizziness, and syncope. Past Medical History:   has a past medical history of Acute MI ([address] CAD (coronary artery disease), CHF (congestive heart failure), NYHA class III ([address] Diabetes mellitus ([address] HTN (hypertension), and Hyperlipidemia.     Surgical History:   has a past surgical history that includes Cardiac surgery; eye surgery (Bilateral); Carotid endarterectomy (Left, 11/02/2016); and Coronary angioplasty with stent (05/31/2018). Social History:   reports that he has never smoked. He has never used smokeless tobacco. He reports that he does not drink alcohol or use drugs. Family History:  family history includes Heart Attack in his brother and brother; Heart Disease in his brother. Home Medications:  Prior to Admission medications    Medication Sig Start Date End Date Taking? Authorizing Provider   carvedilol (COREG) 6.25 MG tablet Take 1 tablet by mouth 2 times daily (with meals). 8/10/12  Yes Neena Martell MD   atorvastatin (LIPITOR) 20 MG tablet Take 1 tablet by mouth daily. 1/26/12 1/25/13 Yes Neena Martell MD   losartan (COZAAR) 50 MG tablet Take 50 mg by mouth daily. Yes Historical Provider, MD   clopidogrel (PLAVIX) 75 MG tablet Take 1 tablet by mouth daily. 10/27/11  Yes Neena Martell MD   metformin (GLUCOPHAGE) 1000 MG tablet Take 1 tablet by mouth 2 times daily (with meals). 10/28/11  Yes Karine Harris MD   aspirin 81 MG EC tablet Take 81 mg by mouth daily. Yes Historical Provider, MD   glyBURIDE (DIABETA) 5 MG tablet Take 5 mg by mouth 2 times daily (with meals). Yes Historical Provider, MD   ferrous sulfate 325 (65 FE) MG tablet Take 325 mg by mouth 2 times daily. Yes Historical Provider, MD   gabapentin (NEURONTIN) 600 MG tablet Take 600 mg by mouth 3 times daily. Yes Historical Provider, MD   therapeutic multivitamin-minerals (THERAGRAN-M) tablet Take 2 tablets by mouth daily. Yes Historical Provider, MD   nitroGLYCERIN (NITROSTAT) 0.4 MG SL tablet Place 1 tablet under the tongue every 5 minutes as needed. 11/8/11   Tristin Martinez NP        Allergies:  Penicillins     Review of Systems:   · Constitutional: there has been no unanticipated weight loss.  There's been no change in energy level, sleep pattern, or affect, memory, mentation, or behavior are noted    Myoview 10/2013  There is an inferior fixed defect consistent with diaphragmatic attenuation. Normal LV function with ejection fraction of 68%. Abnormal EKG response. Similar to 2008 and 2009 studies. ECHO: 4/2016   Normal left ventricle size, wall thickness and systolic function with an   estimated ejection fraction of 55%. No regional wall motion abnormalities are seen.   Diastolic filling parameters suggests grade I diastolic dysfunction.   The aortic valve appears thickened/calcified with decreased leaflet mobility   but no significant stenosis by velocity measurements.   The aortic valve is tricuspid .   The maximum pressure gradient of 10 mmHg and a mean pressure gradient of 5 mmHg.   Trace aortic valve regurgitation.   MIld mitral and tricuspid regurgitation.   Systolic pulmonary artery pressure (SPAP) is normal and estimated at 27 mmHg (RA pressure 3 mmHg). Carotid 10/2016   -The right internal carotid artery appears to have a 1-15% diameter reducing    stenosis based on velocity criteria.    -The left internal carotid artery appears to have a 50-79% diameter reducing    stenosis based on velocity criteria, however ICA/CCA PSV ratio of 6.2    suggest nearing 80%.       Carotid dopplers 5/19/17  Right:  1-15% stenosis of the internal carotid artery based on velocity criteria and  B-Mode image. <50% stenosis of the common carotid artery. <50% stenosis of the external carotid artery. Unremarkable subclavian and vertebral arteries. Left:  50-80% stenosis of the internal carotid artery based on velocity criteria,  however this may be secondary to the recent endarterectomy. There is no  significant plaque visualized. <50% stenosis of the common carotid artery. <50% stenosis of the external carotid artery. Unremarkable subclavian and vertebral arteries. CT 10/2016  Severe, focal stenosis of the left internal carotid artery bulb, 95% diameter. Mild narrowing of the petrous segment of the right carotid artery, 20%   diameter. Moderate sinusitis of the paranasal sinuses. Cath 8/2017  LM 20%  LAD prox stent patent, mid 40%  Cx 20%              OM1 95%              OM2 90%  RCA 40%              RPDA 99% ostium              RPL 50% ostium and distal  LVEF 55%  Normal right heart pressures     PTCA OM1              2.0 x 15 angiosculpt cutting balloon  PTCA OM3              2.0 x 6 cutting balloon  PTCA RPDA              2.0 x 6 cutting balloon    Echo 5/2018  Left ventricular cavity size is normal. Normal left ventricular wall  thickness. Left ventricular function is low normal with ejection fraction  estimated at 45-50%. Posterolateral wall appears hypokinetic. Diastolic  filling parameters suggest grade I diastolic dysfunction. Thickening of leaflets of mitral valve. Mitral annular calcification is  present. Mild mitral regurgitation is present. The left atrium is dilated. Aortic valve appears sclerotic but opens adequately. Mild to moderate tricuspid regurgitation. Estimated pulmonary artery  systolic pressure is at 40 mmHg     Right and left heart cath 5/31/18  LM 20%  LAD 30% prox in stent  Cx 95% prox extending into OM1              OM1 95% ostium  RCA 50%              RPL 50%  LVEF 40%  RA 4,  RV 23/2,  PA 22/5/11,  W 2     PTCA Cx/OM1              2.0 x 12 JOANNA  Low right heart pressures noted assoc w/ hypotension. Hold torsemide. Reduce to 20 mg daily at discharge. EKG 3/4/2021  Normal sinus rhythm  Left bundle branch block  Abnormal ECG    CT chest 34/2/2021  Impression 1. No evidence of pulmonary embolism. 2. Findings consistent with interstitial fibrosis. Echocardiogram 4/9/2021  Summary   LV systolic function is severlly reduced with EF estimated at 20%. Severe global HK overall. Diastolic filling pressures appear normal.   Aortic valve appears sclerotic but opens adequately.    Mitral valve leaflets appear mildly thickened. Mild mitral and tricuspid regurgitation. Systolic pulmonary artery pressure (SPAP) is estimated at 32mmHg (right   atrial pressure 3 mmHg). PFT study 4/9/2021  Six minute walk test: Pt could only walk 150 feet during the 6   minute walk test, very severe dyspnea noted during the test. O2   sats reached a sukumar of 89% on RA during the test.   Interpretation:   Restriction noted. Correlate for ILD +/- pulmonary vascular   disease. Stress test 4/13/2021  Conclusions    Summary  LV enlargement with severe global hypokinesis and EF 21  Medium-large sized anteroseptal fixed defect of moderate intensity  consistent with infarction in the territory of the proximal LAD . Medium-large sized inferoseptal fixed defect of moderate intensity  consistent with infarction in the territory of the mid and distal LCx and/or  RCA . Assessment:   1. Hyperlipidemia: stable. Gets checked thru PCP. 2. HTN (hypertension) -- adequate control   3. CAD- PTCA Cx/OM1, 2.0 x 12 JOANNA   4. Chest pain - no recurrence post PCI 8/2017. remains stable   6. Carotid stenosis - s/p L-CEA  8. LEWIS - multifactorial. Cardiac and pulm  Cardiomyopathy - new, severe. Contributing to SOB. Possible progression CAD vs nonischemic    Pulmonary fibrosis - new dx. Sig cause of SOB but also w/ new severe reduced LVEF    Plan:  Plan for right and left heart cath next week - pt requests [place] as closer to home    Continue current medications   Check blood pressure at home weekly  Follow up with me after the procedure     The scribes documentation has been prepared under my direction and personally reviewed by me in its entirety. I confirm that the note above accurately reflects all work, treatment, procedures, and medical decision making performed by me. Dr. Walker Cruz MD    Scribe's attestation: This note was scribed in the presence of Dr. Walker Cruz M.D. By Jose Hinson.  Sandra Ortiz M.D., [facility], FSCAI

## 2021-04-23 ENCOUNTER — TELEPHONE (OUTPATIENT)
Dept: CARDIOLOGY CLINIC | Age: 81
End: 2021-04-23

## 2021-04-23 ENCOUNTER — OFFICE VISIT (OUTPATIENT)
Dept: CARDIOLOGY CLINIC | Age: 81
End: 2021-04-23
Payer: COMMERCIAL

## 2021-04-23 VITALS
BODY MASS INDEX: 26.66 KG/M2 | DIASTOLIC BLOOD PRESSURE: 72 MMHG | HEART RATE: 100 BPM | HEIGHT: 65 IN | WEIGHT: 160 LBS | OXYGEN SATURATION: 98 % | SYSTOLIC BLOOD PRESSURE: 112 MMHG | TEMPERATURE: 97.8 F

## 2021-04-23 DIAGNOSIS — I42.9 CARDIOMYOPATHY, UNSPECIFIED TYPE (HCC): ICD-10-CM

## 2021-04-23 DIAGNOSIS — R06.02 SOB (SHORTNESS OF BREATH): Primary | ICD-10-CM

## 2021-04-23 DIAGNOSIS — Z98.61 CAD S/P PERCUTANEOUS CORONARY ANGIOPLASTY: Primary | ICD-10-CM

## 2021-04-23 DIAGNOSIS — I10 ESSENTIAL HYPERTENSION: ICD-10-CM

## 2021-04-23 DIAGNOSIS — I50.32 CHRONIC DIASTOLIC CONGESTIVE HEART FAILURE (HCC): ICD-10-CM

## 2021-04-23 DIAGNOSIS — I25.10 CAD S/P PERCUTANEOUS CORONARY ANGIOPLASTY: Primary | ICD-10-CM

## 2021-04-23 DIAGNOSIS — R06.02 SOB (SHORTNESS OF BREATH): ICD-10-CM

## 2021-04-23 DIAGNOSIS — E78.2 MIXED HYPERLIPIDEMIA: ICD-10-CM

## 2021-04-23 PROCEDURE — 99215 OFFICE O/P EST HI 40 MIN: CPT | Performed by: INTERNAL MEDICINE

## 2021-04-23 NOTE — TELEPHONE ENCOUNTER
Per Litzy at [facility] she is scheduling pt for 4/28/2021, PCR 11:30 pt to arrive 10:00AM. They do need PA and order. Any questions gregory Mcghee [phone].

## 2021-04-23 NOTE — TELEPHONE ENCOUNTER
Please call patient to schedule for a right and left heart cath with [facility] next Wednesday.  Can do a Rapid COVID test.

## 2021-04-23 NOTE — TELEPHONE ENCOUNTER
Patient was seen by Dr. Dain Sanchez today and needs a right and left heart cath. Patient's son would like this to be done in [facility] with any internationalist there. Please let us know if the [facility] office needs to start prior auth and who will be doing the procedure.

## 2021-04-23 NOTE — PATIENT INSTRUCTIONS
Plan:  Plan for right and left heart cath next Wednesday (can do a rapid COVID if needed)   Continue current medications   Check blood pressure at home weekly  Follow up with me after the procedure

## 2021-04-28 ENCOUNTER — HOSPITAL ENCOUNTER (OUTPATIENT)
Dept: CARDIAC CATH/INVASIVE PROCEDURES | Age: 81
Discharge: HOME | End: 2021-04-28
Attending: INTERNAL MEDICINE | Admitting: INTERNAL MEDICINE
Payer: COMMERCIAL

## 2021-04-28 VITALS
HEART RATE: 106 BPM | TEMPERATURE: 97.8 F | WEIGHT: 160 LBS | DIASTOLIC BLOOD PRESSURE: 87 MMHG | BODY MASS INDEX: 26.66 KG/M2 | HEIGHT: 65 IN | SYSTOLIC BLOOD PRESSURE: 134 MMHG | OXYGEN SATURATION: 99 %

## 2021-04-28 LAB
ANION GAP SERPL CALCULATED.3IONS-SCNC: 9 MMOL/L (ref 3–16)
BUN BLDV-MCNC: 20 MG/DL (ref 7–20)
CALCIUM SERPL-MCNC: 9.5 MG/DL (ref 8.3–10.6)
CHLORIDE BLD-SCNC: 99 MMOL/L (ref 99–110)
CO2: 30 MMOL/L (ref 21–32)
CREAT SERPL-MCNC: 0.9 MG/DL (ref 0.8–1.3)
EKG ATRIAL RATE: 106 BPM
EKG DIAGNOSIS: NORMAL
EKG P AXIS: -18 DEGREES
EKG P-R INTERVAL: 248 MS
EKG Q-T INTERVAL: 346 MS
EKG QRS DURATION: 156 MS
EKG QTC CALCULATION (BAZETT): 459 MS
EKG R AXIS: -27 DEGREES
EKG T AXIS: 131 DEGREES
EKG VENTRICULAR RATE: 106 BPM
GFR AFRICAN AMERICAN: >60
GFR NON-AFRICAN AMERICAN: >60
GLUCOSE BLD-MCNC: 138 MG/DL (ref 70–99)
HCT VFR BLD CALC: 39.9 % (ref 40.5–52.5)
HEMOGLOBIN: 13.2 G/DL (ref 13.5–17.5)
LEFT VENTRICULAR EJECTION FRACTION MODE: NORMAL
LV EF: 20 %
MCH RBC QN AUTO: 28.7 PG (ref 26–34)
MCHC RBC AUTO-ENTMCNC: 33.1 G/DL (ref 31–36)
MCV RBC AUTO: 86.8 FL (ref 80–100)
PDW BLD-RTO: 14.8 % (ref 12.4–15.4)
PLATELET # BLD: 247 K/UL (ref 135–450)
PMV BLD AUTO: 8.8 FL (ref 5–10.5)
POTASSIUM SERPL-SCNC: 4 MMOL/L (ref 3.5–5.1)
RBC # BLD: 4.6 M/UL (ref 4.2–5.9)
SODIUM BLD-SCNC: 138 MMOL/L (ref 136–145)
WBC # BLD: 11 K/UL (ref 4–11)

## 2021-04-28 PROCEDURE — 85027 COMPLETE CBC AUTOMATED: CPT

## 2021-04-28 PROCEDURE — 93005 ELECTROCARDIOGRAM TRACING: CPT | Performed by: INTERNAL MEDICINE

## 2021-04-28 PROCEDURE — C1751 CATH, INF, PER/CENT/MIDLINE: HCPCS

## 2021-04-28 PROCEDURE — 92928 PRQ TCAT PLMT NTRAC ST 1 LES: CPT | Performed by: INTERNAL MEDICINE

## 2021-04-28 PROCEDURE — C1725 CATH, TRANSLUMIN NON-LASER: HCPCS

## 2021-04-28 PROCEDURE — 2709999900 HC NON-CHARGEABLE SUPPLY

## 2021-04-28 PROCEDURE — 6360000004 HC RX CONTRAST MEDICATION: Performed by: INTERNAL MEDICINE

## 2021-04-28 PROCEDURE — 99153 MOD SED SAME PHYS/QHP EA: CPT

## 2021-04-28 PROCEDURE — 6360000002 HC RX W HCPCS

## 2021-04-28 PROCEDURE — C1887 CATHETER, GUIDING: HCPCS

## 2021-04-28 PROCEDURE — 99152 MOD SED SAME PHYS/QHP 5/>YRS: CPT

## 2021-04-28 PROCEDURE — C1760 CLOSURE DEV, VASC: HCPCS

## 2021-04-28 PROCEDURE — 36415 COLL VENOUS BLD VENIPUNCTURE: CPT

## 2021-04-28 PROCEDURE — 93460 R&L HRT ART/VENTRICLE ANGIO: CPT | Performed by: INTERNAL MEDICINE

## 2021-04-28 PROCEDURE — 93010 ELECTROCARDIOGRAM REPORT: CPT | Performed by: INTERNAL MEDICINE

## 2021-04-28 PROCEDURE — 85347 COAGULATION TIME ACTIVATED: CPT

## 2021-04-28 PROCEDURE — C1874 STENT, COATED/COV W/DEL SYS: HCPCS

## 2021-04-28 PROCEDURE — 2500000003 HC RX 250 WO HCPCS

## 2021-04-28 PROCEDURE — 93458 L HRT ARTERY/VENTRICLE ANGIO: CPT

## 2021-04-28 PROCEDURE — C9600 PERC DRUG-EL COR STENT SING: HCPCS

## 2021-04-28 PROCEDURE — 80048 BASIC METABOLIC PNL TOTAL CA: CPT

## 2021-04-28 PROCEDURE — C1894 INTRO/SHEATH, NON-LASER: HCPCS

## 2021-04-28 PROCEDURE — C1769 GUIDE WIRE: HCPCS

## 2021-04-28 RX ADMIN — IOPAMIDOL 77 ML: 755 INJECTION, SOLUTION INTRAVENOUS at 12:55

## 2021-04-28 NOTE — OP NOTE
Patient:  Sierra Ballesteros   :   1940    Procedural Summary  ~Consent:   Obtained written and verbal consent      Risks/benefits explained in detail  ~Procedure:    Left Heart Catheterization  ~Medications:    Procedural sedation with minimal conscious sedation  ~Complications:   None  ~Blood Loss:    <10cc  ~Specimens:    None obtained  ~Pre-sedation re-evaluation: Performed immediately prior to procedure. Medication and Procedural Reconciliation:  An independent trained observer pushed medications at my direction. We monitored the patient's level of consciousness and vital signs/physiologic status throughout the procedure duration (see start and stop times below). Sedation: 2 mg Versed, 100 mcg Fentanyl  Sedation start: 1146  Sedation stop: 1258    Cardiac Cath PCI, LVG, RHC:  Anatomy:   LM-mid 20%   LAD-prox 90% ISR  Cx-small diffuse up to 50%  RCA-mid 20%, distal 50%  RPDA- nml  LVEF- 20%  LVG- severe apical, anterior hypokinesis  LVEDP- 14    Hemodynamics:  RA- mean 10/8 (7)  RV- 51/0  PAWP-31   PA- 51/23    C. O. 4.04/3.54  C. I. 2.25/1.97  PA Sat 57  AO Sat 90    Intervention  ~Successful PCI to LAD with 3.0x32 JOANNA. PD with 3.5x15 NC to 22atm. Excellent Result. Contrast: 77  Flouro Time: 17.2  Access: R CFA/CFV. Perc stick in sfa (high bifurcation)    Impression  ~Coronary Angiography w/ severe single vessel CAD  ~LVG with LVEF of 20 and LAD regional wall motion abnormalities  ~Successful complex angioplasty and stenting of LAD        Recommendation  ~Aggressive medical treatment and risk factor modification  ~1. Post cath IVF. Bedrest.   2. Recommend beta blocker, acei high potency statin, aspirin and plavix for 6-12 months. 3. Referral to cardiac rehab placed  4. Patient has been advised on the importance of regular exercise of at least 20-30 minutes daily. 5. Patient counseled about and offered assistance for smoking cessation   6. Follow up in 2 weeks with cardiology.  Check echo in 90

## 2021-04-28 NOTE — H&P
[facility]   Cardiac Followup    Referring Provider:  Zahira Farmer MD     No chief complaint on file. Sierra Ballesteros is a [de-identified] y.o. male who is here today for follow up for a history of coronary artery disease- left heart cath 2017 and then a repeat right and left cath 2018 with PTCA Cx/OM1 with 2.0 x 12 OJANNA, hypertension, hyperlipidemia, CHF and SOB. Echocardiogram from from 5/2018 showed an EF of 45-50%, mild to moderate tricuspid regurgitation. He presented to the ER yesterday (3/5/2021) with complaints of SOB and concerns by PCP for atrial fib. EKG showed NSR with LBBB, BNP 3,304, CT chest showed no evidence of PE, interstitial fibrosis. Patient had been visiting [facility] and returned home with SOB and hypoxia. COVID testing negative 3/9/2021. He was referred by pulmonary for an abnormal CT, LEWIS, and Pulmonary fibrosis. He was seen by Dr. Gayathri Mena MD. He underwent a PTT's with six minute walk test- Restriction noted. Correlate for ILD +/- pulmonary vascular   disease. Echocardiogram from 4/9/2021 showed an EF of 20%. Stress test on 4/13/2021 was abnormal.    Today he states continues to have SOB and is now is very SOB with stairs. His O2 Sats shows he is in the 80's with activity. No assoc chest pain. SOB a little better since last OV. Daily. He states he has no SOB at rest and his O2 Sats are 90s with he is not active. He will be following up with pulmonary in one week and is using an inhaler as needed for SOB and cough. He states he has palpitations that feels like a fluttering. He has had both COVID vaccines. Patient currently denies any weight gain, edema, chest pain, dizziness, and syncope. Past Medical History:   has a past medical history of Acute MI ([address] CAD (coronary artery disease), CHF (congestive heart failure), NYHA class III ([address] Diabetes mellitus ([address] HTN (hypertension), and Hyperlipidemia.     Surgical History:   has a past surgical history that includes Cardiac surgery; eye surgery (Bilateral); Carotid endarterectomy (Left, 11/02/2016); and Coronary angioplasty with stent (05/31/2018). Social History:   reports that he has never smoked. He has never used smokeless tobacco. He reports that he does not drink alcohol or use drugs. Family History:  family history includes Heart Attack in his brother and brother; Heart Disease in his brother. Home Medications:  Prior to Admission medications    Medication Sig Start Date End Date Taking? Authorizing Provider   carvedilol (COREG) 6.25 MG tablet Take 1 tablet by mouth 2 times daily (with meals). 8/10/12  Yes Macy Pka MD   atorvastatin (LIPITOR) 20 MG tablet Take 1 tablet by mouth daily. 1/26/12 1/25/13 Yes Macy Pak MD   losartan (COZAAR) 50 MG tablet Take 50 mg by mouth daily. Yes Historical Provider, MD   clopidogrel (PLAVIX) 75 MG tablet Take 1 tablet by mouth daily. 10/27/11  Yes Macy Pak MD   metformin (GLUCOPHAGE) 1000 MG tablet Take 1 tablet by mouth 2 times daily (with meals). 10/28/11  Yes Gideon Cardenas MD   aspirin 81 MG EC tablet Take 81 mg by mouth daily. Yes Historical Provider, MD   glyBURIDE (DIABETA) 5 MG tablet Take 5 mg by mouth 2 times daily (with meals). Yes Historical Provider, MD   ferrous sulfate 325 (65 FE) MG tablet Take 325 mg by mouth 2 times daily. Yes Historical Provider, MD   gabapentin (NEURONTIN) 600 MG tablet Take 600 mg by mouth 3 times daily. Yes Historical Provider, MD   therapeutic multivitamin-minerals (THERAGRAN-M) tablet Take 2 tablets by mouth daily. Yes Historical Provider, MD   nitroGLYCERIN (NITROSTAT) 0.4 MG SL tablet Place 1 tablet under the tongue every 5 minutes as needed. 11/8/11   Tori Gregory NP        Allergies:  Penicillins     Review of Systems:   · Constitutional: there has been no unanticipated weight loss. There's been no change in energy level, sleep pattern, or activity level.      · Eyes: No visual changes or diplopia. No scleral icterus. · ENT: No Headaches, hearing loss or vertigo. No mouth sores or sore throat. · Cardiovascular: Reviewed in HPI  · Respiratory: No cough or wheezing, no sputum production. No hematemesis. · Gastrointestinal: No abdominal pain, appetite loss, blood in stools. No change in bowel or bladder habits. · Genitourinary: No dysuria, trouble voiding, or hematuria. · Musculoskeletal:  No gait disturbance, weakness or joint complaints. · Integumentary: No rash or pruritis. · Neurological: No headache, diplopia, change in muscle strength, numbness or tingling. No change in gait, balance, coordination, mood, affect, memory, mentation, behavior. · Psychiatric: No anxiety, no depression. · Endocrine: No malaise, fatigue or temperature intolerance. No excessive thirst, fluid intake, or urination. No tremor. · Hematologic/Lymphatic: No abnormal bruising or bleeding, blood clots or swollen lymph nodes. · Allergic/Immunologic: No nasal congestion or hives. Physical Examination:    There were no vitals filed for this visit. Constitutional and General Appearance: NAD   Respiratory:  · Normal excursion and expansion without use of accessory muscles  Resp Auscultation: rales in bases                Cardiovascular:  · The apical impulses not displaced  · Heart tones are crisp and normal  · Elev JVP  · The carotid bruit bilaterally L > R  · Normal S1S2, No S3, 1/6 systolic murmur  · Peripheral pulses are symmetrical and full  · There is no clubbing, cyanosis of the extremities.   · No edema  · Femoral Arteries: 2+ and equal  · Pedal Pulses: 2+ and equal   Abdomen:  · No masses or tenderness  · Liver/Spleen: No Abnormalities Noted  Neurological/Psychiatric:  · Alert and oriented in all spheres  · Moves all extremities well  · Exhibits normal gait balance and coordination  · No abnormalities of mood, affect, memory, mentation, or behavior are noted    Myoview 10/2013  There is an inferior fixed defect consistent with diaphragmatic attenuation. Normal LV function with ejection fraction of 68%. Abnormal EKG response. Similar to 2008 and 2009 studies. ECHO: 4/2016   Normal left ventricle size, wall thickness and systolic function with an   estimated ejection fraction of 55%. No regional wall motion abnormalities are seen.   Diastolic filling parameters suggests grade I diastolic dysfunction.   The aortic valve appears thickened/calcified with decreased leaflet mobility   but no significant stenosis by velocity measurements.   The aortic valve is tricuspid .   The maximum pressure gradient of 10 mmHg and a mean pressure gradient of 5 mmHg.   Trace aortic valve regurgitation.   MIld mitral and tricuspid regurgitation.   Systolic pulmonary artery pressure (SPAP) is normal and estimated at 27 mmHg (RA pressure 3 mmHg). Carotid 10/2016   -The right internal carotid artery appears to have a 1-15% diameter reducing    stenosis based on velocity criteria.    -The left internal carotid artery appears to have a 50-79% diameter reducing    stenosis based on velocity criteria, however ICA/CCA PSV ratio of 6.2    suggest nearing 80%.       Carotid dopplers 5/19/17  Right:  1-15% stenosis of the internal carotid artery based on velocity criteria and  B-Mode image. <50% stenosis of the common carotid artery. <50% stenosis of the external carotid artery. Unremarkable subclavian and vertebral arteries. Left:  50-80% stenosis of the internal carotid artery based on velocity criteria,  however this may be secondary to the recent endarterectomy. There is no  significant plaque visualized. <50% stenosis of the common carotid artery. <50% stenosis of the external carotid artery. Unremarkable subclavian and vertebral arteries. CT 10/2016  Severe, focal stenosis of the left internal carotid artery bulb, 95% diameter.    Mild narrowing of the petrous segment of the right carotid artery, 20% diameter. Moderate sinusitis of the paranasal sinuses. Cath 8/2017  LM 20%  LAD prox stent patent, mid 40%  Cx 20%              OM1 95%              OM2 90%  RCA 40%              RPDA 99% ostium              RPL 50% ostium and distal  LVEF 55%  Normal right heart pressures     PTCA OM1              2.0 x 15 angiosculpt cutting balloon  PTCA OM3              2.0 x 6 cutting balloon  PTCA RPDA              2.0 x 6 cutting balloon    Echo 5/2018  Left ventricular cavity size is normal. Normal left ventricular wall  thickness. Left ventricular function is low normal with ejection fraction  estimated at 45-50%. Posterolateral wall appears hypokinetic. Diastolic  filling parameters suggest grade I diastolic dysfunction. Thickening of leaflets of mitral valve. Mitral annular calcification is  present. Mild mitral regurgitation is present. The left atrium is dilated. Aortic valve appears sclerotic but opens adequately. Mild to moderate tricuspid regurgitation. Estimated pulmonary artery  systolic pressure is at 40 mmHg     Right and left heart cath 5/31/18  LM 20%  LAD 30% prox in stent  Cx 95% prox extending into OM1              OM1 95% ostium  RCA 50%              RPL 50%  LVEF 40%  RA 4,  RV 23/2,  PA 22/5/11,  W 2     PTCA Cx/OM1              2.0 x 12 JOANNA  Low right heart pressures noted assoc w/ hypotension. Hold torsemide. Reduce to 20 mg daily at discharge. EKG 3/4/2021  Normal sinus rhythm  Left bundle branch block  Abnormal ECG    CT chest 34/2/2021  Impression 1. No evidence of pulmonary embolism. 2. Findings consistent with interstitial fibrosis. Echocardiogram 4/9/2021  Summary   LV systolic function is severlly reduced with EF estimated at 20%. Severe global HK overall. Diastolic filling pressures appear normal.   Aortic valve appears sclerotic but opens adequately. Mitral valve leaflets appear mildly thickened. Mild mitral and tricuspid regurgitation.    Systolic pulmonary artery

## 2021-04-30 LAB
POC ACT LR: 279 SEC
POC ACT LR: 364 SEC

## 2021-05-06 ENCOUNTER — OFFICE VISIT (OUTPATIENT)
Dept: PULMONOLOGY | Age: 81
End: 2021-05-06
Payer: COMMERCIAL

## 2021-05-06 VITALS
OXYGEN SATURATION: 95 % | TEMPERATURE: 97.4 F | HEART RATE: 66 BPM | SYSTOLIC BLOOD PRESSURE: 110 MMHG | DIASTOLIC BLOOD PRESSURE: 70 MMHG

## 2021-05-06 DIAGNOSIS — J47.9 BRONCHIECTASIS WITHOUT COMPLICATION (HCC): ICD-10-CM

## 2021-05-06 DIAGNOSIS — I50.82 BIVENTRICULAR CONGESTIVE HEART FAILURE (HCC): Primary | ICD-10-CM

## 2021-05-06 DIAGNOSIS — J84.112 IPF (IDIOPATHIC PULMONARY FIBROSIS) (HCC): ICD-10-CM

## 2021-05-06 PROCEDURE — 99214 OFFICE O/P EST MOD 30 MIN: CPT | Performed by: INTERNAL MEDICINE

## 2021-05-06 RX ORDER — IPRATROPIUM BROMIDE AND ALBUTEROL SULFATE 2.5; .5 MG/3ML; MG/3ML
1 SOLUTION RESPIRATORY (INHALATION) EVERY 4 HOURS
Qty: 360 ML | Refills: 11 | Status: SHIPPED | OUTPATIENT
Start: 2021-05-06

## 2021-05-06 ASSESSMENT — ENCOUNTER SYMPTOMS
BLOOD IN STOOL: 0
ANAL BLEEDING: 0
RHINORRHEA: 0
VOICE CHANGE: 0
CHEST TIGHTNESS: 1
COUGH: 1
STRIDOR: 0
CONSTIPATION: 0
ABDOMINAL DISTENTION: 0
DIARRHEA: 0
ABDOMINAL PAIN: 0
SORE THROAT: 0
APNEA: 0
BACK PAIN: 0
WHEEZING: 0
SHORTNESS OF BREATH: 1
SINUS PRESSURE: 0
CHOKING: 0

## 2021-05-06 NOTE — PROGRESS NOTES
Keerthi Garcia    YOB: 1940     Date of Service:  5/6/2021     Chief Complaint   Patient presents with    Shortness of Breath     family monitors O2 sat's and pt's drop in the 80's with walking         HPI patient has been accompanied by his son to our office, who gave me most of the information. Patient had coronary angiogram on 4/28 which showed significant LAD disease status post PCI/stent. EF noted to be 20%. Patient is currently undergoing evaluation for BiV pacer/AICD. Patient has significant dyspnea with exertion-short distances, particularly associated with cough-patient apparently does not sleep well at night due to the cough. Mostly dry cough, but son has noticed that he becomes very congested. Son feels that patient is declining very quickly.     Allergies   Allergen Reactions    Penicillins Itching     No outpatient medications have been marked as taking for the 5/6/21 encounter (Office Visit) with Moriah Aranda MD.       Immunization History   Administered Date(s) Administered    COVID-19, Yoo Peter, PF, 30mcg/0.3mL 02/25/2021, 03/18/2021    Influenza Virus Vaccine 10/26/2011    Pneumococcal Polysaccharide ([id number]) 10/26/2011       Past Medical History:   Diagnosis Date    Acute MI ([address]     CAD (coronary artery disease)     CHF (congestive heart failure), NYHA class III ([address]     Diabetes mellitus ([address]     HTN (hypertension) 10/26/2011    Hyperlipidemia 10/26/2011     Past Surgical History:   Procedure Laterality Date    CARDIAC SURGERY      stent  x1   2008    CAROTID ENDARTERECTOMY Left 11/02/2016    LEFT CAROTID ENDARTERECTOMY WITH PATCH ANGIOPLASTY    CORONARY ANGIOPLASTY WITH STENT PLACEMENT  05/31/2018    EYE SURGERY Bilateral     cataracts w/lens     Family History   Problem Relation Age of Onset    Heart Attack Brother     Heart Disease Brother     Heart Attack Brother        Review of Systems:  Review of Systems   Constitutional: Positive for fatigue. Negative for activity change, appetite change and fever. HENT: Negative for congestion, ear discharge, ear pain, postnasal drip, rhinorrhea, sinus pressure, sneezing, sore throat, tinnitus and voice change. Respiratory: Positive for cough, chest tightness and shortness of breath. Negative for apnea, choking, wheezing and stridor. Cardiovascular: Negative for chest pain, palpitations and leg swelling. Gastrointestinal: Negative for abdominal distention, abdominal pain, anal bleeding, blood in stool, constipation and diarrhea. Musculoskeletal: Positive for gait problem. Negative for arthralgias and back pain. Skin: Negative for pallor and rash. Allergic/Immunologic: Negative for environmental allergies. Neurological: Negative for dizziness, tremors, seizures, syncope, speech difficulty, weakness, light-headedness, numbness and headaches. Hematological: Negative for adenopathy. Does not bruise/bleed easily. Psychiatric/Behavioral: Negative for sleep disturbance. Vitals:    05/06/21 1319   BP: 110/70   Pulse: 66   Temp: 97.4 °F (36.3 °C)   TempSrc: Temporal   SpO2: 95%     No flowsheet data found. There is no height or weight on file to calculate BMI. Wt Readings from Last 3 Encounters:   04/28/21 160 lb (72.6 kg)   04/23/21 160 lb (72.6 kg)   04/13/21 160 lb (72.6 kg)     BP Readings from Last 3 Encounters:   05/06/21 110/70   04/28/21 134/87   04/23/21 112/72         Physical Exam  Constitutional:       General: He is not in acute distress. Appearance: He is well-developed. He is not diaphoretic. HENT:      Mouth/Throat:      Pharynx: No oropharyngeal exudate. Cardiovascular:      Rate and Rhythm: Normal rate and regular rhythm. Heart sounds: Normal heart sounds. No murmur. Pulmonary:      Effort: No respiratory distress. Breath sounds: Rales present. No wheezing. Chest:      Chest wall: No tenderness.    Abdominal:      General: There is no distension. Palpations: There is no mass. Tenderness: There is no abdominal tenderness. There is no guarding or rebound. Musculoskeletal:         General: No swelling, tenderness or deformity. Skin:     Coloration: Skin is not pale. Findings: No erythema or rash. Neurological:      Mental Status: He is alert and oriented to person, place, and time. Cranial Nerves: No cranial nerve deficit. Motor: No abnormal muscle tone. Coordination: Coordination normal.      Deep Tendon Reflexes: Reflexes normal.             Health Maintenance   Topic Date Due    DTaP/Tdap/Td vaccine (1 - Tdap) Never done    Shingles Vaccine (1 of 2) Never done    Lipid screen  08/18/2018    Annual Wellness Visit (AWV)  Never done    Flu vaccine (Season Ended) 09/01/2021    Potassium monitoring  04/28/2022    Creatinine monitoring  04/28/2022    Pneumococcal 65+ years Vaccine  Completed    COVID-19 Vaccine  Completed    Hepatitis A vaccine  Aged Out    Hib vaccine  Aged Out    Meningococcal (ACWY) vaccine  Aged Out          Assessment/Plan:    Severe ischemic cardiomyopathy/status post LAD stent. Dyspnea with exertion, O2 sats has been noticed to drop in the low 80s with activity. Performed walk test in our office, patient qualifies for home oxygen which will be organized for. Patient is mobile within and around his home, will benefit from POC-2 L with activity. Patient will also receive a home concentrator for use particularly at nighttime. Patient is awaiting cardiology evaluation, will possibly benefit from LifeVest/AICD. IPF based on imaging-typical findings of bibasal subpleural reticular opacities and honeycombing. Rheumatologic work-up was negative except elevated ESR. I suspect patient's dyspnea is mostly related to cardiac issues, patient does have significant restriction FVC 1.6 L [52% predicted] and TLC of 54% predicted.   Will start patient on home nebulizer treatment, DuoNeb 4 times daily as needed-patient has significant bronchiectasis noted on CT imaging from IPF. We will request for a new home nebulizer machine. Not keen on starting patient on nintedanib/Ofev, given his poor cardiac function. This will be put on hold, in order to avoid any side effects/further decline in performance status. We will revisit this during next visit. Return in about 2 months (around 7/6/2021).

## 2021-05-07 ENCOUNTER — TELEPHONE (OUTPATIENT)
Dept: CARDIOLOGY CLINIC | Age: 81
End: 2021-05-07

## 2021-05-07 DIAGNOSIS — R06.02 SOB (SHORTNESS OF BREATH): Primary | ICD-10-CM

## 2021-05-07 NOTE — TELEPHONE ENCOUNTER
Son is asking that this patients appt be moved sooner than September . The patient got a stent last week and saw his pulmonary doctor yesterday . He has pulmonary fibrosis and son is concerned that his EF has madiha from he 42's to the 20's. He will take any appt any day any time . Please call son , if he doesn't answer at 1st number call second #.

## 2021-05-07 NOTE — TELEPHONE ENCOUNTER
Patient should be on good medical therapy for at least 3 months post stenting we can schedule follow up in 3 months with echo same day he was stented 4/23 I believe please schedule beginning of august end of July with PILLO

## 2021-05-09 ENCOUNTER — PATIENT MESSAGE (OUTPATIENT)
Dept: CARDIOLOGY CLINIC | Age: 81
End: 2021-05-09

## 2021-05-10 NOTE — TELEPHONE ENCOUNTER
From: Christina Goodman  To: Tiffany Blair MD  Sent: 5/9/2021 9:20 PM EDT  Subject: Tr Sanchez,    We need a prescription for torsemide 100 MG. [facility] messed it up. They had prescription for both torsemide 10 MG and torsemide 100 MG, I believe they deleted the prescription for torsemide 100 MG and now they don't have it in the system. I would appreciate your help (right now my father is taking [id number]). Also, he is complaining about short of breath a lot more after stent placement, and currently, he is on oxygen. I have moved his appointment with you from May 28 to May 14 to discuss the course of action. I tried to move up his appointment with Elctophisiologist Dr. Dino Carreon but still, it is in August. I believe he needs immediate attention.      Diana Turcios  [phone]

## 2021-05-11 NOTE — TELEPHONE ENCOUNTER
Call  Pt and pharmacy to clarify torsemide dose  We can discuss issues further at OV along w/ the EP appt

## 2021-05-11 NOTE — TELEPHONE ENCOUNTER
Spoke with pharmacy they state they have had an order from 12/2020 from Dr. Noemi Galeazzi for 10 mg torsemide, take 1/2 tablet daily. They have not had a recent 100 mg tablet order. Pt son would like enough 100 mg torsemide tablet to get him to the appt on Friday because his father is having a lot of SOB. Please advise.

## 2021-05-12 RX ORDER — TORSEMIDE 100 MG/1
100 TABLET ORAL DAILY
Qty: 30 TABLET | Refills: 0 | Status: SHIPPED | OUTPATIENT
Start: 2021-05-12 | End: 2021-06-07 | Stop reason: SDUPTHER

## 2021-05-12 NOTE — TELEPHONE ENCOUNTER
I spoke with patient's son. Patient is taking torsemide 100 mg daily. Also patient's son states patient continues to have SOB and has not been very active since his cath. He is wondering if we should move his EP appointment up. He has a follow up on 5/14/2021 and we will discuss then.

## 2021-05-14 ENCOUNTER — OFFICE VISIT (OUTPATIENT)
Dept: CARDIOLOGY CLINIC | Age: 81
End: 2021-05-14
Payer: COMMERCIAL

## 2021-05-14 ENCOUNTER — TELEPHONE (OUTPATIENT)
Dept: CARDIOLOGY CLINIC | Age: 81
End: 2021-05-14

## 2021-05-14 VITALS
SYSTOLIC BLOOD PRESSURE: 110 MMHG | DIASTOLIC BLOOD PRESSURE: 78 MMHG | HEART RATE: 103 BPM | OXYGEN SATURATION: 98 % | WEIGHT: 174.6 LBS | BODY MASS INDEX: 29.09 KG/M2 | HEIGHT: 65 IN

## 2021-05-14 DIAGNOSIS — I10 ESSENTIAL HYPERTENSION: ICD-10-CM

## 2021-05-14 DIAGNOSIS — I25.10 CAD S/P PERCUTANEOUS CORONARY ANGIOPLASTY: Primary | ICD-10-CM

## 2021-05-14 DIAGNOSIS — I65.23 BILATERAL CAROTID ARTERY STENOSIS: ICD-10-CM

## 2021-05-14 DIAGNOSIS — R06.02 SOB (SHORTNESS OF BREATH): ICD-10-CM

## 2021-05-14 DIAGNOSIS — E78.2 MIXED HYPERLIPIDEMIA: ICD-10-CM

## 2021-05-14 DIAGNOSIS — I50.32 CHRONIC DIASTOLIC CONGESTIVE HEART FAILURE (HCC): ICD-10-CM

## 2021-05-14 DIAGNOSIS — Z98.61 CAD S/P PERCUTANEOUS CORONARY ANGIOPLASTY: Primary | ICD-10-CM

## 2021-05-14 DIAGNOSIS — I25.110 CORONARY ARTERY DISEASE INVOLVING NATIVE CORONARY ARTERY OF NATIVE HEART WITH UNSTABLE ANGINA PECTORIS (HCC): ICD-10-CM

## 2021-05-14 PROCEDURE — 99214 OFFICE O/P EST MOD 30 MIN: CPT | Performed by: INTERNAL MEDICINE

## 2021-05-14 RX ORDER — SPIRONOLACTONE 25 MG/1
25 TABLET ORAL 2 TIMES DAILY
Qty: 180 TABLET | Refills: 1 | Status: SHIPPED | OUTPATIENT
Start: 2021-05-14 | End: 2021-05-17 | Stop reason: SDUPTHER

## 2021-05-14 RX ORDER — SACUBITRIL AND VALSARTAN 24; 26 MG/1; MG/1
1 TABLET, FILM COATED ORAL 2 TIMES DAILY
Qty: 60 TABLET | Refills: 2 | Status: SHIPPED | OUTPATIENT
Start: 2021-05-14 | End: 2021-10-22

## 2021-05-14 RX ORDER — CARVEDILOL 6.25 MG/1
TABLET ORAL
Qty: 180 TABLET | Refills: 3 | Status: SHIPPED | OUTPATIENT
Start: 2021-05-14 | End: 2021-06-17

## 2021-05-14 RX ORDER — LOSARTAN POTASSIUM 50 MG/1
TABLET ORAL
Qty: 90 TABLET | Refills: 3 | Status: CANCELLED | OUTPATIENT
Start: 2021-05-14

## 2021-05-14 NOTE — TELEPHONE ENCOUNTER
Please call patient to schedule as a new patient with [facility]. Patient needs to be seen in the next 4-6 weeks, June 16 is open.

## 2021-05-14 NOTE — PROGRESS NOTES
[facility]   Cardiac Followup    Referring Provider:  Ana Luisa Varma MD     Chief Complaint   Patient presents with    Follow-up    Coronary Artery Disease    Hypertension    Hyperlipidemia    Congestive Heart Failure    Cardiomyopathy    Shortness of Breath     sob has increased    Fatigue     more tired than normal    Edema     face and ankles    Chest Pain     when he breaths he has discomfort     Angelica Reeves is a [de-identified] y.o. male who is here today for follow up for a history of coronary artery disease- left heart cath 2017 and then a repeat right and left cath 2018 with PTCA Cx/OM1 with 2.0 x 12 JOANNA, hypertension, hyperlipidemia, CHF and SOB. Echocardiogram from from 5/2018 showed an EF of 45-50%, mild to moderate tricuspid regurgitation. He presented to the ER yesterday (3/5/2021) with complaints of SOB and concerns by PCP for atrial fib. EKG showed NSR with LBBB, BNP 3,304, CT chest showed no evidence of PE, interstitial fibrosis. Patient had been visiting [facility] and returned home with SOB and hypoxia. COVID testing negative 3/9/2021. He was referred by pulmonary for an abnormal CT, LEWIS, and Pulmonary fibrosis. He was seen by Dr. Rob Ocampo MD. He underwent a PTT's with six minute walk test- Restriction noted. Correlate for ILD +/- pulmonary vascular disease. Echocardiogram from 4/9/2021 showed an EF of 20%. Stress test on 4/13/2021 was abnormal. He underwent a right and left heart cath on 4/28/2021, Successful PCI to LAD with 3.0x32 JOANNA. PD with 3.5x15 NC to 22atm, LVG with LVEF of 20 and LAD regional wall motion abnormalities. Today he states since his procedure he has been having increased SOB related to activity. He has assoc chest tightness, weight gain. SOB all the time, everyday, worse w/ minimal activity. He has not been very active and is mostly being inactive.  His son states he has been having some facial swelling and his weight is up from 160 lbs on 4/28/2021 to 174 lbs today. He states he has been having chest pain with a deep breath and also feels SOB when he presses on his abdomen. He has been drinking a of water daily. Patient currently denies any palpitations, chest pain, dizziness, and syncope. Past Medical History:   has a past medical history of Acute MI ([facility] [address] CAD (coronary artery disease), CHF (congestive heart failure), NYHA class III ([facility] [address] Diabetes mellitus ([facility] [address] HTN (hypertension), and Hyperlipidemia. Surgical History:   has a past surgical history that includes Cardiac surgery; eye surgery (Bilateral); Carotid endarterectomy (Left, 11/02/2016); and Coronary angioplasty with stent (05/31/2018). Social History:   reports that he has never smoked. He has never used smokeless tobacco. He reports that he does not drink alcohol or use drugs. Family History:  family history includes Heart Attack in his brother and brother; Heart Disease in his brother. Home Medications:  Prior to Admission medications    Medication Sig Start Date End Date Taking? Authorizing Provider   carvedilol (COREG) 6.25 MG tablet Take 1 tablet by mouth 2 times daily (with meals). 8/10/12  Yes Edil Sutton MD   atorvastatin (LIPITOR) 20 MG tablet Take 1 tablet by mouth daily. 1/26/12 1/25/13 Yes Edil Sutton MD   losartan (COZAAR) 50 MG tablet Take 50 mg by mouth daily. Yes Historical Provider, MD   clopidogrel (PLAVIX) 75 MG tablet Take 1 tablet by mouth daily. 10/27/11  Yes Edil Sutton MD   metformin (GLUCOPHAGE) 1000 MG tablet Take 1 tablet by mouth 2 times daily (with meals). 10/28/11  Yes Jeramie Hanna MD   aspirin 81 MG EC tablet Take 81 mg by mouth daily. Yes Historical Provider, MD   glyBURIDE (DIABETA) 5 MG tablet Take 5 mg by mouth 2 times daily (with meals). Yes Historical Provider, MD   ferrous sulfate 325 (65 FE) MG tablet Take 325 mg by mouth 2 times daily.      Yes Historical Provider, MD   gabapentin (NEURONTIN) 600 MG tablet Unremarkable subclavian and vertebral arteries. Left:  50-80% stenosis of the internal carotid artery based on velocity criteria,  however this may be secondary to the recent endarterectomy. There is no  significant plaque visualized. <50% stenosis of the common carotid artery. <50% stenosis of the external carotid artery. Unremarkable subclavian and vertebral arteries. CT 10/2016  Severe, focal stenosis of the left internal carotid artery bulb, 95% diameter. Mild narrowing of the petrous segment of the right carotid artery, 20%   diameter. Moderate sinusitis of the paranasal sinuses. Cath 8/2017  LM 20%  LAD prox stent patent, mid 40%  Cx 20%              OM1 95%              OM2 90%  RCA 40%              RPDA 99% ostium              RPL 50% ostium and distal  LVEF 55%  Normal right heart pressures     PTCA OM1              2.0 x 15 angiosculpt cutting balloon  PTCA OM3              2.0 x 6 cutting balloon  PTCA RPDA              2.0 x 6 cutting balloon    Echo 5/2018  Left ventricular cavity size is normal. Normal left ventricular wall  thickness. Left ventricular function is low normal with ejection fraction  estimated at 45-50%. Posterolateral wall appears hypokinetic. Diastolic  filling parameters suggest grade I diastolic dysfunction. Thickening of leaflets of mitral valve. Mitral annular calcification is  present. Mild mitral regurgitation is present. The left atrium is dilated. Aortic valve appears sclerotic but opens adequately. Mild to moderate tricuspid regurgitation. Estimated pulmonary artery  systolic pressure is at 40 mmHg     Right and left heart cath 5/31/18  LM 20%  LAD 30% prox in stent  Cx 95% prox extending into OM1              OM1 95% ostium  RCA 50%              RPL 50%  LVEF 40%  RA 4,  RV 23/2,  PA 22/5/11,  W 2     PTCA Cx/OM1              2.0 x 12 JOANNA  Low right heart pressures noted assoc w/ hypotension. Hold torsemide.  Reduce to 20 mg daily at discharge. EKG 3/4/2021  Normal sinus rhythm  Left bundle branch block  Abnormal ECG    CT chest 34/2/2021  Impression 1. No evidence of pulmonary embolism. 2. Findings consistent with interstitial fibrosis. Echocardiogram 4/9/2021  Summary   LV systolic function is severlly reduced with EF estimated at 20%. Severe global HK overall. Diastolic filling pressures appear normal.   Aortic valve appears sclerotic but opens adequately. Mitral valve leaflets appear mildly thickened. Mild mitral and tricuspid regurgitation. Systolic pulmonary artery pressure (SPAP) is estimated at 32mmHg (right   atrial pressure 3 mmHg). PFT study 4/9/2021  Six minute walk test: Pt could only walk 150 feet during the 6   minute walk test, very severe dyspnea noted during the test. O2   sats reached a sukumar of 89% on RA during the test.   Interpretation:   Restriction noted. Correlate for ILD +/- pulmonary vascular   disease. Stress test 4/13/2021  Conclusions    Summary  LV enlargement with severe global hypokinesis and EF 21  Medium-large sized anteroseptal fixed defect of moderate intensity  consistent with infarction in the territory of the proximal LAD . Medium-large sized inferoseptal fixed defect of moderate intensity  consistent with infarction in the territory of the mid and distal LCx and/or  RCA . Right and left cath Dr. Chinmay Landis 4/28/2021  Cardiac Cath PCI, LVG, RHC:  Anatomy:   LM-mid 20%   LAD-prox 90% ISR  Cx-small diffuse up to 50%  RCA-mid 20%, distal 50%  RPDA- nml  LVEF- 20%  LVG- severe apical, anterior hypokinesis  LVEDP- 14     Hemodynamics:  RA- mean 10/8 (7)  RV- 51/0  PAWP-31   PA- 51/23     C. O. 4.04/3.54  C. I. 2.25/1.97  PA Sat 57  AO Sat 90     Intervention  ~Successful PCI to LAD with 3.0x32 JOANNA. PD with 3.5x15 NC to 22atm. Excellent Result.      Contrast: 77  Flouro Time: 17.2  Access: R CFA/CFV.  Perc stick in sfa (high bifurcation)     Impression  ~Coronary Angiography w/ severe single vessel CAD  ~LVG with LVEF of 20 and LAD regional wall motion abnormalities  ~Successful complex angioplasty and stenting of LAD       Assessment:   1. Hyperlipidemia: stable. Gets checked thru PCP. 2. HTN (hypertension) -- stable control   3. CAD- s/p PCI LAD 4/2021   4. Chest pain - no recurrence post PCI 8/2017. remains stable   6. Carotid stenosis - s/p L-CEA  8. LEWIS - multifactorial. Cardiac and pulm. Worse recently. Cardiomyopathy - new, severe. Contributing to SOB. Possible progression CAD vs nonischemic    Pulmonary fibrosis - new dx. Sig contributing to SOB but at this time CHF seems to be primary concern. Chronic sCHF - decompensated. Son notes pt drinks a lot of water   Chest tightness due to CHF  Cardiomyopathy - severe     Plan:  Start Aldactone (spironolactone) 25 daily in the mornings   No more than 2 liters of fluid daily- would mainly drink water   For the next 3 days take an extra torsemide 100 mg at 4:00 pm   Labs- Next Thursday BNP and BMP   Increase Coreg to 6.25 mg twice a day   Stop Losartan and after 2 days Start Entresto 24/26 twice a day   Call if you start to feel dizzy, light headed or top number of  blood pressure starts to run under 100 with medication changes. Would consider decreasing Enstresto to half a pill twice a day    Referral to heart failure team to be seen in 4-6 weeks   Plan to repeat echocardiogram after optimizing medical therapy as scheduled on 8/23/2021  Consider BiV ICD if EF remains <35%  Would go to the hospital if shortness of breath increases   Follow up with me in 2 weeks     Scribe's attestation: This note was scribed in the presence of Dr. Khanh Suazo M.D. By Cece Meeks RN    The scribes documentation has been prepared under my direction and personally reviewed by me in its entirety. I confirm that the note above accurately reflects all work, treatment, procedures, and medical decision making performed by me.     Dr. Khanh Suazo, MD Elsa Cunningham.  Hillary Rivero M.D., Pattie Hartman

## 2021-05-14 NOTE — LETTER
[facility]   Cardiac Followup    Referring Provider:  Ana Luisa Varma MD     Chief Complaint   Patient presents with    Follow-up    Coronary Artery Disease    Hypertension    Hyperlipidemia    Congestive Heart Failure    Cardiomyopathy    Shortness of Breath     sob has increased    Fatigue     more tired than normal    Edema     face and ankles    Chest Pain     when he breaths he has discomfort     Angelica Reeves is a [de-identified] y.o. male who is here today for follow up for a history of coronary artery disease- left heart cath 2017 and then a repeat right and left cath 2018 with PTCA Cx/OM1 with 2.0 x 12 JOANNA, hypertension, hyperlipidemia, CHF and SOB. Echocardiogram from from 5/2018 showed an EF of 45-50%, mild to moderate tricuspid regurgitation. He presented to the ER yesterday (3/5/2021) with complaints of SOB and concerns by PCP for atrial fib. EKG showed NSR with LBBB, BNP 3,304, CT chest showed no evidence of PE, interstitial fibrosis. Patient had been visiting [facility] and returned home with SOB and hypoxia. COVID testing negative 3/9/2021. He was referred by pulmonary for an abnormal CT, LEWIS, and Pulmonary fibrosis. He was seen by Dr. Rob Ocampo MD. He underwent a PTT's with six minute walk test- Restriction noted. Correlate for ILD +/- pulmonary vascular disease. Echocardiogram from 4/9/2021 showed an EF of 20%. Stress test on 4/13/2021 was abnormal. He underwent a right and left heart cath on 4/28/2021, Successful PCI to LAD with 3.0x32 JOANNA. PD with 3.5x15 NC to 22atm, LVG with LVEF of 20 and LAD regional wall motion abnormalities. Today he states since his procedure he has been having increased SOB related to activity. He has assoc chest tightness, weight gain. SOB all the time, everyday, worse w/ minimal activity. He has not been very active and is mostly being inactive.  His son states he has been having some facial swelling and his weight is up from 160 lbs on 4/28/2021 to 174 lbs today. He states he has been having chest pain with a deep breath and also feels SOB when he presses on his abdomen. He has been drinking a of water daily. Patient currently denies any palpitations, chest pain, dizziness, and syncope. Past Medical History:   has a past medical history of Acute MI ([facility] [address] CAD (coronary artery disease), CHF (congestive heart failure), NYHA class III ([facility] [address] Diabetes mellitus ([facility] [address] HTN (hypertension), and Hyperlipidemia. Surgical History:   has a past surgical history that includes Cardiac surgery; eye surgery (Bilateral); Carotid endarterectomy (Left, 11/02/2016); and Coronary angioplasty with stent (05/31/2018). Social History:   reports that he has never smoked. He has never used smokeless tobacco. He reports that he does not drink alcohol or use drugs. Family History:  family history includes Heart Attack in his brother and brother; Heart Disease in his brother. Home Medications:  Prior to Admission medications    Medication Sig Start Date End Date Taking? Authorizing Provider   carvedilol (COREG) 6.25 MG tablet Take 1 tablet by mouth 2 times daily (with meals). 8/10/12  Yes Mir Meng MD   atorvastatin (LIPITOR) 20 MG tablet Take 1 tablet by mouth daily. 1/26/12 1/25/13 Yes Mir Meng MD   losartan (COZAAR) 50 MG tablet Take 50 mg by mouth daily. Yes Historical Provider, MD   clopidogrel (PLAVIX) 75 MG tablet Take 1 tablet by mouth daily. 10/27/11  Yes Mir Meng MD   metformin (GLUCOPHAGE) 1000 MG tablet Take 1 tablet by mouth 2 times daily (with meals). 10/28/11  Yes Missy Moore MD   aspirin 81 MG EC tablet Take 81 mg by mouth daily. Yes Historical Provider, MD   glyBURIDE (DIABETA) 5 MG tablet Take 5 mg by mouth 2 times daily (with meals). Yes Historical Provider, MD   ferrous sulfate 325 (65 FE) MG tablet Take 325 mg by mouth 2 times daily.      Yes Historical Provider, MD   gabapentin (NEURONTIN) 600 MG tablet Take 600 mg by mouth 3 times daily. Yes Historical Provider, MD   therapeutic multivitamin-minerals (THERAGRAN-M) tablet Take 2 tablets by mouth daily. Yes Historical Provider, MD   nitroGLYCERIN (NITROSTAT) 0.4 MG SL tablet Place 1 tablet under the tongue every 5 minutes as needed. 11/8/11   Lu Lynn NP        Allergies:  Penicillins     Review of Systems:   · Constitutional: there has been no unanticipated weight loss. There's been no change in energy level, sleep pattern, or activity level. · Eyes: No visual changes or diplopia. No scleral icterus. · ENT: No Headaches, hearing loss or vertigo. No mouth sores or sore throat. · Cardiovascular: Reviewed in HPI  · Respiratory: No cough or wheezing, no sputum production. No hematemesis. · Gastrointestinal: No abdominal pain, appetite loss, blood in stools. No change in bowel or bladder habits. · Genitourinary: No dysuria, trouble voiding, or hematuria. · Musculoskeletal:  No gait disturbance, weakness or joint complaints. · Integumentary: No rash or pruritis. · Neurological: No headache, diplopia, change in muscle strength, numbness or tingling. No change in gait, balance, coordination, mood, affect, memory, mentation, behavior. · Psychiatric: No anxiety, no depression. · Endocrine: No malaise, fatigue or temperature intolerance. No excessive thirst, fluid intake, or urination. No tremor. · Hematologic/Lymphatic: No abnormal bruising or bleeding, blood clots or swollen lymph nodes. · Allergic/Immunologic: No nasal congestion or hives.     Physical Examination:    Vitals:    05/14/21 1144   BP: 110/78   Pulse: 103   SpO2: 98%        Constitutional and General Appearance: NAD   Respiratory:  · Normal excursion and expansion without use of accessory muscles  Resp Auscultation: rales in bases                Cardiovascular:  · The apical impulses not displaced  · Heart tones are crisp and normal  · Elev JVP  · The carotid bruit bilaterally L > R  · Normal S1S2, No S3, 1/6 systolic murmur  · Peripheral pulses are symmetrical and full  · There is no clubbing, cyanosis of the extremities. · No edema  · Femoral Arteries: 2+ and equal  · Pedal Pulses: 2+ and equal   Abdomen:  · No masses or tenderness  · Liver/Spleen: No Abnormalities Noted  Neurological/Psychiatric:  · Alert and oriented in all spheres  · Moves all extremities well  · Exhibits normal gait balance and coordination  · No abnormalities of mood, affect, memory, mentation, or behavior are noted    Myoview 10/2013  There is an inferior fixed defect consistent with diaphragmatic attenuation. Normal LV function with ejection fraction of 68%. Abnormal EKG response. Similar to 2008 and 2009 studies. ECHO: 4/2016   Normal left ventricle size, wall thickness and systolic function with an   estimated ejection fraction of 55%. No regional wall motion abnormalities are seen.   Diastolic filling parameters suggests grade I diastolic dysfunction.   The aortic valve appears thickened/calcified with decreased leaflet mobility   but no significant stenosis by velocity measurements.   The aortic valve is tricuspid .   The maximum pressure gradient of 10 mmHg and a mean pressure gradient of 5 mmHg.   Trace aortic valve regurgitation.   MIld mitral and tricuspid regurgitation.   Systolic pulmonary artery pressure (SPAP) is normal and estimated at 27 mmHg (RA pressure 3 mmHg). Carotid 10/2016   -The right internal carotid artery appears to have a 1-15% diameter reducing    stenosis based on velocity criteria.    -The left internal carotid artery appears to have a 50-79% diameter reducing    stenosis based on velocity criteria, however ICA/CCA PSV ratio of 6.2    suggest nearing 80%.       Carotid dopplers 5/19/17  Right:  1-15% stenosis of the internal carotid artery based on velocity criteria and  B-Mode image. <50% stenosis of the common carotid artery. <50% stenosis of the external carotid artery. Unremarkable subclavian and vertebral arteries. Left:  50-80% stenosis of the internal carotid artery based on velocity criteria,  however this may be secondary to the recent endarterectomy. There is no  significant plaque visualized. <50% stenosis of the common carotid artery. <50% stenosis of the external carotid artery. Unremarkable subclavian and vertebral arteries. CT 10/2016  Severe, focal stenosis of the left internal carotid artery bulb, 95% diameter. Mild narrowing of the petrous segment of the right carotid artery, 20%   diameter. Moderate sinusitis of the paranasal sinuses. Cath 8/2017  LM 20%  LAD prox stent patent, mid 40%  Cx 20%              OM1 95%              OM2 90%  RCA 40%              RPDA 99% ostium              RPL 50% ostium and distal  LVEF 55%  Normal right heart pressures     PTCA OM1              2.0 x 15 angiosculpt cutting balloon  PTCA OM3              2.0 x 6 cutting balloon  PTCA RPDA              2.0 x 6 cutting balloon    Echo 5/2018  Left ventricular cavity size is normal. Normal left ventricular wall  thickness. Left ventricular function is low normal with ejection fraction  estimated at 45-50%. Posterolateral wall appears hypokinetic. Diastolic  filling parameters suggest grade I diastolic dysfunction. Thickening of leaflets of mitral valve. Mitral annular calcification is  present. Mild mitral regurgitation is present. The left atrium is dilated. Aortic valve appears sclerotic but opens adequately. Mild to moderate tricuspid regurgitation. Estimated pulmonary artery  systolic pressure is at 40 mmHg     Right and left heart cath 5/31/18  LM 20%  LAD 30% prox in stent  Cx 95% prox extending into OM1              OM1 95% ostium  RCA 50%              RPL 50%  LVEF 40%  RA 4,  RV 23/2,  PA 22/5/11,  W 2     PTCA Cx/OM1              2.0 x 12 JOANNA  Low right heart pressures noted assoc w/ hypotension. Hold torsemide. Reduce to 20 mg daily at discharge. EKG 3/4/2021  Normal sinus rhythm  Left bundle branch block  Abnormal ECG    CT chest 34/2/2021  Impression 1. No evidence of pulmonary embolism. 2. Findings consistent with interstitial fibrosis. Echocardiogram 4/9/2021  Summary   LV systolic function is severlly reduced with EF estimated at 20%. Severe global HK overall. Diastolic filling pressures appear normal.   Aortic valve appears sclerotic but opens adequately. Mitral valve leaflets appear mildly thickened. Mild mitral and tricuspid regurgitation. Systolic pulmonary artery pressure (SPAP) is estimated at 32mmHg (right   atrial pressure 3 mmHg). PFT study 4/9/2021  Six minute walk test: Pt could only walk 150 feet during the 6   minute walk test, very severe dyspnea noted during the test. O2   sats reached a sukumar of 89% on RA during the test.   Interpretation:   Restriction noted. Correlate for ILD +/- pulmonary vascular   disease. Stress test 4/13/2021  Conclusions    Summary  LV enlargement with severe global hypokinesis and EF 21  Medium-large sized anteroseptal fixed defect of moderate intensity  consistent with infarction in the territory of the proximal LAD . Medium-large sized inferoseptal fixed defect of moderate intensity  consistent with infarction in the territory of the mid and distal LCx and/or  RCA . Right and left cath Dr. Dion Gavin 4/28/2021  Cardiac Cath PCI, LVG, RHC:  Anatomy:   LM-mid 20%   LAD-prox 90% ISR  Cx-small diffuse up to 50%  RCA-mid 20%, distal 50%  RPDA- nml  LVEF- 20%  LVG- severe apical, anterior hypokinesis  LVEDP- 14     Hemodynamics:  RA- mean 10/8 (7)  RV- 51/0  PAWP-31   PA- 51/23     C. O. 4.04/3.54  C. I. 2.25/1.97  PA Sat 57  AO Sat 90     Intervention  ~Successful PCI to LAD with 3.0x32 JOANNA. PD with 3.5x15 NC to 22atm. Excellent Result.      Contrast: 77  Flouro Time: 17.2  Access: R CFA/CFV.  Perc stick in sfa (high bifurcation)     Impression  ~Coronary Angiography w/ severe single vessel CAD Adrian Solorzano M.D., Ricci Hurd

## 2021-05-17 ENCOUNTER — PATIENT MESSAGE (OUTPATIENT)
Dept: CARDIOLOGY CLINIC | Age: 81
End: 2021-05-17

## 2021-05-17 RX ORDER — SPIRONOLACTONE 25 MG/1
25 TABLET ORAL DAILY
Qty: 30 TABLET | Refills: 5
Start: 2021-05-17 | End: 2021-06-21

## 2021-05-17 NOTE — PROGRESS NOTES
Spoke with patient's son. Patient has lost 5 lbs and his SOB has decreased. I reviewed medication changes per [facility]- Decrease Coreg to 3.125 mg BID, decrease Entresto to half a pill BID. Decrease torsemide to 50 mg daily. Monitor weight and blood pressure at home.

## 2021-05-17 NOTE — TELEPHONE ENCOUNTER
According to pt's chart, there is a [employer] message that was sent today and needs to be addressed. After reading mychart message, should pt wait 4-6 wks to be seen with NPRB? Should the plan/provider change? Please advise mychart message and this message.

## 2021-05-17 NOTE — TELEPHONE ENCOUNTER
5-17-21  at [phone] informing to call back to schedule with NPRB as a new pt per [address] for week of 6-14-21 or the next two weeks after that if need be, but start with 4 wks from now first.     Also informed in message, the [employer] message that was sent will be addressed by [facility] due to [address] being [address] [place] [address].

## 2021-05-17 NOTE — TELEPHONE ENCOUNTER
Hi Dr. Hector Bosworth  My dad is feeling better but his blood pressure has dropped to 80. As per you suggestion, reaching out to you for advice.  Below is blood pressure data:     5/15: 4:43pn 101/74  5/15 9pm: 121/79  5/16 8am: 99/63  5/16  1:17 pm 136/117  5/16 9:35pm 82/57  5/17 7:33 am 79/      ([facility] please route accordingly)

## 2021-05-18 NOTE — TELEPHONE ENCOUNTER
According to pt's chart, Evans Sifuentes in call center spoke with pt's son and scheduled pt to see NPRB on 6-17-21 at 230pm.

## 2021-05-25 ENCOUNTER — HOSPITAL ENCOUNTER (OUTPATIENT)
Age: 81
Discharge: HOME | End: 2021-05-25
Payer: COMMERCIAL

## 2021-05-25 DIAGNOSIS — R06.02 SOB (SHORTNESS OF BREATH): ICD-10-CM

## 2021-05-25 LAB
ANION GAP SERPL CALCULATED.3IONS-SCNC: 13 MMOL/L (ref 3–16)
BUN BLDV-MCNC: 31 MG/DL (ref 7–20)
CALCIUM SERPL-MCNC: 8.8 MG/DL (ref 8.3–10.6)
CHLORIDE BLD-SCNC: 97 MMOL/L (ref 99–110)
CO2: 28 MMOL/L (ref 21–32)
CREAT SERPL-MCNC: 1.2 MG/DL (ref 0.8–1.3)
GFR AFRICAN AMERICAN: >60
GFR NON-AFRICAN AMERICAN: 58
GLUCOSE BLD-MCNC: 226 MG/DL (ref 70–99)
POTASSIUM SERPL-SCNC: 4.6 MMOL/L (ref 3.5–5.1)
PRO-BNP: 4511 PG/ML (ref 0–449)
SODIUM BLD-SCNC: 138 MMOL/L (ref 136–145)

## 2021-05-25 PROCEDURE — 80048 BASIC METABOLIC PNL TOTAL CA: CPT

## 2021-05-25 PROCEDURE — 36415 COLL VENOUS BLD VENIPUNCTURE: CPT

## 2021-05-25 PROCEDURE — 83880 ASSAY OF NATRIURETIC PEPTIDE: CPT

## 2021-05-26 ENCOUNTER — TELEPHONE (OUTPATIENT)
Dept: CARDIOLOGY CLINIC | Age: 81
End: 2021-05-26

## 2021-05-27 NOTE — PROGRESS NOTES
[facility]   Cardiac Followup    Referring Provider:  Delia Harper MD     No chief complaint on file. Scout Haines is a [de-identified] y.o. male who is here today for follow up for a history of coronary artery disease- left heart cath 2017 and then a repeat right and left cath 2018 with PTCA Cx/OM1 with 2.0 x 12 JOANNA, hypertension, hyperlipidemia, CHF and SOB. Echocardiogram from from 5/2018 showed an EF of 45-50%, mild to moderate tricuspid regurgitation. He presented to the ER yesterday (3/5/2021) with complaints of SOB and concerns by PCP for atrial fib. EKG showed NSR with LBBB, BNP 3,304, CT chest showed no evidence of PE, interstitial fibrosis. Patient had been visiting [facility] and returned home with SOB and hypoxia. COVID testing negative 3/9/2021. He was referred by pulmonary for an abnormal CT, LEWIS, and Pulmonary fibrosis. He was seen by Dr. Delaney Alamo MD. He underwent a PTT's with six minute walk test- Restriction noted. Correlate for ILD +/- pulmonary vascular disease. Echocardiogram from 4/9/2021 showed an EF of 20%. Stress test on 4/13/2021 was abnormal. He underwent a right and left heart cath on 4/28/2021, Successful PCI to LAD with 3.0x32 JOANNA. PD with 3.5x15 NC to 22atm, LVG with LVEF of 20 and LAD regional wall motion abnormalities. At his last visit he reported having increased SOB with even minimal activity. Medications changes at last visit- started Aldactone 25 mg daily, stopped Losartan and started Entresto 24/26 BID, Coreg increased to 6.25 mg BID. Patient's son called into the office and reported that patient was feeling better but blood pressure was running low with medication changes so Coreg was decreased back to 3.125 mg BID, Entresto decreased to half a pill BID. Torsemide decreased to 50 mg daily. Today he states he has overall been feeling only a little better. Remains sig SOB. His blood pressure at home is monitored 3 times per day and it is lower in the mornings.  His weight has not changed much even with a higher dose of torsemide and addition of aldactone 25 mg daily. He continues to have some swelling in his abdomen and face. He has been drinking less than 2 liter of fluid daily. He has been complaining of some aches and pains when trying to sleep at night. Patient currently denies any palpitations, chest pain, dizziness, and syncope. Past Medical History:   has a past medical history of Acute MI ([facility] [address] CAD (coronary artery disease), CHF (congestive heart failure), NYHA class III ([facility] [address] Diabetes mellitus ([facility] [address] HTN (hypertension), and Hyperlipidemia. Surgical History:   has a past surgical history that includes Cardiac surgery; eye surgery (Bilateral); Carotid endarterectomy (Left, 11/02/2016); and Coronary angioplasty with stent (05/31/2018). Social History:   reports that he has never smoked. He has never used smokeless tobacco. He reports that he does not drink alcohol and does not use drugs. Family History:  family history includes Heart Attack in his brother and brother; Heart Disease in his brother. Home Medications:  Prior to Admission medications    Medication Sig Start Date End Date Taking? Authorizing Provider   carvedilol (COREG) 6.25 MG tablet Take 1 tablet by mouth 2 times daily (with meals). 8/10/12  Yes Abel Monroy MD   atorvastatin (LIPITOR) 20 MG tablet Take 1 tablet by mouth daily. 1/26/12 1/25/13 Yes Abel Monroy MD   losartan (COZAAR) 50 MG tablet Take 50 mg by mouth daily. Yes Historical Provider, MD   clopidogrel (PLAVIX) 75 MG tablet Take 1 tablet by mouth daily. 10/27/11  Yes Abel Monroy MD   metformin (GLUCOPHAGE) 1000 MG tablet Take 1 tablet by mouth 2 times daily (with meals). 10/28/11  Yes Rachel Murillo MD   aspirin 81 MG EC tablet Take 81 mg by mouth daily. Yes Historical Provider, MD   glyBURIDE (DIABETA) 5 MG tablet Take 5 mg by mouth 2 times daily (with meals).      Yes Historical Provider, Cardiovascular:  · The apical impulses not displaced  · Heart tones are crisp and normal  · Elev JVP  · The carotid bruit bilaterally L > R  · Normal S1S2, No S3, 1/6 systolic murmur  · Peripheral pulses are symmetrical and full  · There is no clubbing, cyanosis of the extremities. · No edema  · Femoral Arteries: 2+ and equal  · Pedal Pulses: 2+ and equal   Abdomen:  · No masses or tenderness  · Liver/Spleen: No Abnormalities Noted  Neurological/Psychiatric:  · Alert and oriented in all spheres  · Moves all extremities well  · Exhibits normal gait balance and coordination  · No abnormalities of mood, affect, memory, mentation, or behavior are noted    Myoview 10/2013  There is an inferior fixed defect consistent with diaphragmatic attenuation. Normal LV function with ejection fraction of 68%. Abnormal EKG response. Similar to 2008 and 2009 studies. ECHO: 4/2016   Normal left ventricle size, wall thickness and systolic function with an   estimated ejection fraction of 55%. No regional wall motion abnormalities are seen.   Diastolic filling parameters suggests grade I diastolic dysfunction.   The aortic valve appears thickened/calcified with decreased leaflet mobility   but no significant stenosis by velocity measurements.   The aortic valve is tricuspid .   The maximum pressure gradient of 10 mmHg and a mean pressure gradient of 5 mmHg.   Trace aortic valve regurgitation.   MIld mitral and tricuspid regurgitation.   Systolic pulmonary artery pressure (SPAP) is normal and estimated at 27 mmHg (RA pressure 3 mmHg). Carotid 10/2016   -The right internal carotid artery appears to have a 1-15% diameter reducing    stenosis based on velocity criteria.    -The left internal carotid artery appears to have a 50-79% diameter reducing    stenosis based on velocity criteria, however ICA/CCA PSV ratio of 6.2    suggest nearing 80%.          Carotid dopplers 5/19/17  Right:  1-15% stenosis of the internal carotid artery based on velocity criteria and  B-Mode image. <50% stenosis of the common carotid artery. <50% stenosis of the external carotid artery. Unremarkable subclavian and vertebral arteries. Left:  50-80% stenosis of the internal carotid artery based on velocity criteria,  however this may be secondary to the recent endarterectomy. There is no  significant plaque visualized. <50% stenosis of the common carotid artery. <50% stenosis of the external carotid artery. Unremarkable subclavian and vertebral arteries. CT 10/2016  Severe, focal stenosis of the left internal carotid artery bulb, 95% diameter. Mild narrowing of the petrous segment of the right carotid artery, 20%   diameter. Moderate sinusitis of the paranasal sinuses. Cath 8/2017  LM 20%  LAD prox stent patent, mid 40%  Cx 20%              OM1 95%              OM2 90%  RCA 40%              RPDA 99% ostium              RPL 50% ostium and distal  LVEF 55%  Normal right heart pressures     PTCA OM1              2.0 x 15 angiosculpt cutting balloon  PTCA OM3              2.0 x 6 cutting balloon  PTCA RPDA              2.0 x 6 cutting balloon    Echo 5/2018  Left ventricular cavity size is normal. Normal left ventricular wall  thickness. Left ventricular function is low normal with ejection fraction  estimated at 45-50%. Posterolateral wall appears hypokinetic. Diastolic  filling parameters suggest grade I diastolic dysfunction. Thickening of leaflets of mitral valve. Mitral annular calcification is  present. Mild mitral regurgitation is present. The left atrium is dilated. Aortic valve appears sclerotic but opens adequately. Mild to moderate tricuspid regurgitation.  Estimated pulmonary artery  systolic pressure is at 40 mmHg     Right and left heart cath 5/31/18  LM 20%  LAD 30% prox in stent  Cx 95% prox extending into OM1              OM1 95% ostium  RCA 50%              RPL 50%  LVEF 40%  RA 4,  RV 23/2,  PA 22/5/11,  W 2     PTCA Cx/OM1 2.0 x 12 JOANNA  Low right heart pressures noted assoc w/ hypotension. Hold torsemide. Reduce to 20 mg daily at discharge. EKG 3/4/2021  Normal sinus rhythm  Left bundle branch block  Abnormal ECG    CT chest 34/2/2021  Impression 1. No evidence of pulmonary embolism. 2. Findings consistent with interstitial fibrosis. Echocardiogram 4/9/2021  Summary   LV systolic function is severlly reduced with EF estimated at 20%. Severe global HK overall. Diastolic filling pressures appear normal.   Aortic valve appears sclerotic but opens adequately. Mitral valve leaflets appear mildly thickened. Mild mitral and tricuspid regurgitation. Systolic pulmonary artery pressure (SPAP) is estimated at 32mmHg (right   atrial pressure 3 mmHg). PFT study 4/9/2021  Six minute walk test: Pt could only walk 150 feet during the 6   minute walk test, very severe dyspnea noted during the test. O2   sats reached a sukumar of 89% on RA during the test.   Interpretation:   Restriction noted. Correlate for ILD +/- pulmonary vascular   disease. Stress test 4/13/2021  Conclusions    Summary  LV enlargement with severe global hypokinesis and EF 21  Medium-large sized anteroseptal fixed defect of moderate intensity  consistent with infarction in the territory of the proximal LAD . Medium-large sized inferoseptal fixed defect of moderate intensity  consistent with infarction in the territory of the mid and distal LCx and/or  RCA . Right and left cath Dr. Madalyn Garica 4/28/2021  Cardiac Cath PCI, LVG, RHC:  Anatomy:   LM-mid 20%   LAD-prox 90% ISR  Cx-small diffuse up to 50%  RCA-mid 20%, distal 50%  RPDA- nml  LVEF- 20%  LVG- severe apical, anterior hypokinesis  LVEDP- 14     Hemodynamics:  RA- mean 10/8 (7)  RV- 51/0  PAWP-31   PA- 51/23     C. O. 4.04/3.54  C. I. 2.25/1.97  PA Sat 57  AO Sat 90     Intervention  ~Successful PCI to LAD with 3.0x32 JOANNA. PD with 3.5x15 NC to 22atm.  Excellent Result.      Contrast: 77  Flouro Time: 17.2  Access: R CFA/CFV. Perc stick in sfa (high bifurcation)     Impression  ~Coronary Angiography w/ severe single vessel CAD  ~LVG with LVEF of 20 and LAD regional wall motion abnormalities  ~Successful complex angioplasty and stenting of LAD       Assessment:   1. Hyperlipidemia: stable. 2. HTN (hypertension) -- has been low post meds added but no symptoms    3. CAD- s/p PCI LAD 4/2021   4. Chest pain - no recurrence post PCI 8/2017. remains stable   6. Carotid stenosis - s/p L-CEA  8. LEWIS - multifactorial. Cardiac and pulm. Worse recently. No sig improvement w/ increase diuretics   Cardiomyopathy - new, severe. May be combo ischemic and nonischemic    Pulmonary fibrosis - new dx. Sig contributing to SOB but at this time CHF seems to be primary concern. Chronic sCHF - remains  Decompensated despite high dose diuretics   Tachycardia    Plan:  Increase Coreg back to 6.25 mg twice a day   Start Metolazone 5 mg as needed. Will take x next 2 days and then assess response  Call next Tuesday with an update on weights and symptoms   Repeat labs pending reassessment Tuesday   If fails to improve may need admit for IV lasix and milrinone   Follow up with the heart failure team as scheduled   Plan to repeat echocardiogram after optimizing medical therapy as scheduled on 8/23/2021  Consider BiV ICD if EF remains <35%  Follow up with me July     Dorothye's attestation: This note was scribed in the presence of Dr. Concepción Lorenzana M.D. By Santos Sears RN    The scribes documentation has been prepared under my direction and personally reviewed by me in its entirety. I confirm that the note above accurately reflects all work, treatment, procedures, and medical decision making performed by me. MD Estefania Rodríguez.  Darius Reyes M.D., Mark Ferrell

## 2021-05-28 ENCOUNTER — OFFICE VISIT (OUTPATIENT)
Dept: CARDIOLOGY CLINIC | Age: 81
End: 2021-05-28
Payer: COMMERCIAL

## 2021-05-28 VITALS
BODY MASS INDEX: 27.82 KG/M2 | OXYGEN SATURATION: 98 % | WEIGHT: 167 LBS | SYSTOLIC BLOOD PRESSURE: 118 MMHG | HEART RATE: 108 BPM | HEIGHT: 65 IN | DIASTOLIC BLOOD PRESSURE: 77 MMHG

## 2021-05-28 DIAGNOSIS — I50.22 CHRONIC SYSTOLIC HEART FAILURE (HCC): Primary | ICD-10-CM

## 2021-05-28 PROCEDURE — 99214 OFFICE O/P EST MOD 30 MIN: CPT | Performed by: INTERNAL MEDICINE

## 2021-05-28 RX ORDER — METOLAZONE 5 MG/1
5 TABLET ORAL DAILY PRN
Qty: 30 TABLET | Refills: 0 | Status: SHIPPED | OUTPATIENT
Start: 2021-05-28 | End: 2021-11-05

## 2021-06-02 ENCOUNTER — PATIENT MESSAGE (OUTPATIENT)
Dept: CARDIOLOGY CLINIC | Age: 81
End: 2021-06-02

## 2021-06-07 RX ORDER — TORSEMIDE 100 MG/1
100 TABLET ORAL DAILY
Qty: 30 TABLET | Refills: 3 | Status: SHIPPED | OUTPATIENT
Start: 2021-06-07 | End: 2021-06-21

## 2021-06-17 ENCOUNTER — OFFICE VISIT (OUTPATIENT)
Dept: CARDIOLOGY CLINIC | Age: 81
End: 2021-06-17
Payer: COMMERCIAL

## 2021-06-17 ENCOUNTER — TELEPHONE (OUTPATIENT)
Dept: CARDIOLOGY CLINIC | Age: 81
End: 2021-06-17

## 2021-06-17 VITALS
DIASTOLIC BLOOD PRESSURE: 62 MMHG | WEIGHT: 158 LBS | SYSTOLIC BLOOD PRESSURE: 112 MMHG | HEART RATE: 96 BPM | BODY MASS INDEX: 26.33 KG/M2 | HEIGHT: 65 IN | OXYGEN SATURATION: 95 %

## 2021-06-17 DIAGNOSIS — D50.9 IRON DEFICIENCY ANEMIA, UNSPECIFIED IRON DEFICIENCY ANEMIA TYPE: ICD-10-CM

## 2021-06-17 DIAGNOSIS — I25.5 ISCHEMIC CARDIOMYOPATHY: ICD-10-CM

## 2021-06-17 DIAGNOSIS — I50.22 CHRONIC SYSTOLIC HEART FAILURE (HCC): Primary | ICD-10-CM

## 2021-06-17 PROCEDURE — 99214 OFFICE O/P EST MOD 30 MIN: CPT | Performed by: NURSE PRACTITIONER

## 2021-06-17 RX ORDER — CARVEDILOL 6.25 MG/1
TABLET ORAL
Qty: 180 TABLET | Refills: 3
Start: 2021-06-17 | End: 2021-08-04

## 2021-06-17 NOTE — Clinical Note
[place] patient,   Seen yesterday, looks good. Checking labs before going up on entresto. Metolazone worked great. He sees you next week. He probably needs CRT-D given QRS was 156. .. Thanks!  Ld John CNP, 6/18/2021, 2:46 PM

## 2021-06-17 NOTE — TELEPHONE ENCOUNTER
PER NPRB at [facility], She would like the pt to be seen with  as a new pt at either [facility] or [facility] in 3-4wks. Both schedules are booked, also booked at [facility]. The pt's son who is pt's POA would prefer to be seen at [facility] or [facility] due to transportation. Please advise if either schedule can be overbooked and what date and time. I informed pt's son Galen Segovia that we will call him back at [phone]. He stated to please go ahead and schedule pt if able to and if no answer at time of call leave detailed message of date and time. He stated he will call back if any issue.

## 2021-06-17 NOTE — PROGRESS NOTES
[facility]   Cardiac Follow-up    Primary Care Doctor:  Trinidad Boland MD    Chief Complaint   Patient presents with    New Patient        History of Present Illness:   I had the pleasure of seeing Faisal Kurtz in follow up for cardiomyopathy  History of CAD status post PTCA of circumflex/OM1 with JOANNA, HTN, HLD. Echocardiogram 2018 EF 45-50%. Chest CT showed pulmonary fibrosis March 2021, seen by pulmonary, pulmonary function test showed restrictive lung disease. Echocardiogram April 2021 with a EF 20%, stress test 4/13/2021 was abnormal.  Status post right and left heart cath 4/20/2021, successful PCI to LAD with JOANNA. History is provided with the help of his son. His edema has improved. Carvedilol increased 6/2/21  Metolazone started of May 2021-weight decreased from 160 pounds down to 151-152lbs    Patient continues on half tab of the 24-26 mg Entresto twice a day. Son is encouraged patient to walk more. Patient is able to walk in the driveway after dinner he is able to walk about 200 feet and then requires to rest.  That he will walk additional 200 feet. Patient continues with some dyspnea with exertion. He is off of the oxygen during the day, but he is wearing the oxygen at night. He had some lower blood pressures with a systolic BP in the 20D in the mornings, however they would improve by the afternoons. Patient has a good appetite. Son is keeping patient's compliance with the fluid restriction, keeping it at 2 L. Patient is up at night, but then sleeps during the day. Son reports that patient is having some mild tremors of the hands. Home weight 153lbs     Julius Serna describes symptoms including fatigue but denies chest pain, palpitations, syncope.      Past Medical History:   has a past medical history of Acute MI ([address] CAD (coronary artery disease), CHF (congestive heart failure), NYHA class III ([address] Diabetes mellitus ([address] HTN (hypertension), and Hyperlipidemia. Surgical History:   has a past surgical history that includes Cardiac surgery; eye surgery (Bilateral); Carotid endarterectomy (Left, 11/02/2016); and Coronary angioplasty with stent (05/31/2018). Social History:   reports that he has never smoked. He has never used smokeless tobacco. He reports that he does not drink alcohol and does not use drugs. Family History:   Family History   Problem Relation Age of Onset    Heart Attack Brother     Heart Disease Brother     Heart Attack Brother        Home Medications:  Prior to Admission medications    Medication Sig Start Date End Date Taking?  Authorizing Provider   torsemide (DEMADEX) 100 MG tablet Take 1 tablet by mouth daily 6/7/21  Yes Samantha Weinstein MD   metOLazone (ZAROXOLYN) 5 MG tablet Take 1 tablet by mouth daily as needed (swelling) 5/28/21  Yes Yassine Oliver MD   spironolactone (ALDACTONE) 25 MG tablet Take 1 tablet by mouth daily 5/17/21  Yes Yassine Oliver MD   Nebulizers MISC by Does not apply route   Yes Historical Provider, MD   OXYGEN Inhale into the lungs 2.5 L all the time   Yes Historical Provider, MD   carvedilol (COREG) 6.25 MG tablet TAKE ONE TABLET BY MOUTH TWICE A DAY WITH MEALS  Patient taking differently: 3.125 mg 2 times daily (with meals) TAKE ONE TABLET BY MOUTH TWICE A DAY WITH MEALS 5/14/21  Yes Yassine Oliver MD   sacubitril-valsartan (ENTRESTO) 24-26 MG per tablet Take 1 tablet by mouth 2 times daily  Patient taking differently: Take 1 tablet by mouth 2 times daily Taking 1/2 tablet bid 5/14/21  Yes Yassine Oliver MD   ipratropium-albuterol (DUONEB) 0.5-2.5 (3) MG/3ML SOLN nebulizer solution Inhale 3 mLs into the lungs every 4 hours 5/6/21  Yes Michele Ross MD   albuterol sulfate HFA (VENTOLIN HFA) 108 (90 Base) MCG/ACT inhaler Inhale 2 puffs into the lungs 4 times daily as needed for Wheezing 4/13/21  Yes Michele Ross MD   b complex vitamins capsule Take 1 CO2 30 03/19/2021    BUN 31 05/25/2021    BUN 20 04/28/2021    BUN 23 03/19/2021    CREATININE 1.2 05/25/2021    CREATININE 0.9 04/28/2021    CREATININE 0.9 03/19/2021     BNP:   Lab Results   Component Value Date    PROBNP 4,511 05/25/2021    PROBNP 1,500 03/19/2021    PROBNP 3,304 03/04/2021     Lipids:   Lab Results   Component Value Date    CHOL 115 08/18/2017        Lab Results   Component Value Date    TRIG 130 08/18/2017        Lab Results   Component Value Date    HDL 41 08/18/2017        Lab Results   Component Value Date    LDLCALC 48 08/18/2017        Lab Results   Component Value Date    LABVLDL 26 08/18/2017      No results found for: CHOLHDLRATIO    EF:   Lab Results   Component Value Date    LVEF 20 04/28/2021    LVEFMODE Cardiac Cath 04/28/2021       Testing reviewed:  ECHO: 4/2016   Normal left ventricle size, wall thickness and systolic function with an   estimated ejection fraction of 55%. No regional wall motion abnormalities are seen.   Diastolic filling parameters suggests grade I diastolic dysfunction.   The aortic valve appears thickened/calcified with decreased leaflet mobility   but no significant stenosis by velocity measurements.   The aortic valve is tricuspid .   The maximum pressure gradient of 10 mmHg and a mean pressure gradient of 5 mmHg.   Trace aortic valve regurgitation.   MIld mitral and tricuspid regurgitation.   Systolic pulmonary artery pressure (SPAP) is normal and estimated at 27 mmHg (RA pressure 3 mmHg).   Cath 8/2017  LM 20%  LAD prox stent patent, mid 40%  Cx 20%              OM1 95%              OM2 90%  RCA 40%              RPDA 99% ostium              RPL 50% ostium and distal  LVEF 55%  Normal right heart pressures     PTCA OM1              2.0 x 15 angiosculpt cutting balloon  PTCA OM3              2.0 x 6 cutting balloon  PTCA RPDA              2.0 x 6 cutting balloon     Echo 5/2018  Left ventricular cavity size is normal. Normal left ventricular wall  thickness. Left ventricular function is low normal with ejection fraction  estimated at 45-50%. Posterolateral wall appears hypokinetic. Diastolic  filling parameters suggest grade I diastolic dysfunction. Thickening of leaflets of mitral valve. Mitral annular calcification is  present. Mild mitral regurgitation is present. The left atrium is dilated. Aortic valve appears sclerotic but opens adequately. Mild to moderate tricuspid regurgitation. Estimated pulmonary artery  systolic pressure is at 40 mmHg     Right and left heart cath 5/31/18  LM 20%  LAD 30% prox in stent  Cx 95% prox extending into OM1              OM1 95% ostium  RCA 50%              RPL 50%  LVEF 40%  RA 4,  RV 23/2,  PA 22/5/11,  W 2     PTCA Cx/OM1              2.0 x 12 JOANNA  Low right heart pressures noted assoc w/ hypotension. Hold torsemide. Reduce to 20 mg daily at discharge.      Echocardiogram 4/9/2021  Summary   LV systolic function is severlly reduced with EF estimated at 20%.   Severe global HK overall.   Diastolic filling pressures appear normal.   Aortic valve appears sclerotic but opens adequately.   Mitral valve leaflets appear mildly thickened.   Mild mitral and tricuspid regurgitation.   Systolic pulmonary artery pressure (SPAP) is estimated at 32mmHg (right   atrial pressure 3 mmHg).     Stress test 4/13/2021  Conclusions    Summary  LV enlargement with severe global hypokinesis and EF 21  Medium-large sized anteroseptal fixed defect of moderate intensity  consistent with infarction in the territory of the proximal LAD .  Medium-large sized inferoseptal fixed defect of moderate intensity  consistent with infarction in the territory of the mid and distal LCx and/or  RCA .      Right and left cath Dr. Andrew Gracia 4/28/2021  Cardiac Cath PCI, LVG, RHC:  Anatomy:   LM-mid 20%   LAD-prox 90% ISR  Cx-small diffuse up to 50%  RCA-mid 20%, distal 50%  RPDA- nml  LVEF- 20%  LVG- severe apical, anterior

## 2021-06-17 NOTE — TELEPHONE ENCOUNTER
I cannot get the patient in until the end of August or September due to vacations. If he needs to be seen earlier, perhaps he should come back to [place] and see [facility] who knows him and schedule with me later.   MARCIA

## 2021-06-18 ENCOUNTER — HOSPITAL ENCOUNTER (OUTPATIENT)
Age: 81
Discharge: HOME | End: 2021-06-18
Payer: COMMERCIAL

## 2021-06-18 DIAGNOSIS — D50.9 IRON DEFICIENCY ANEMIA, UNSPECIFIED IRON DEFICIENCY ANEMIA TYPE: ICD-10-CM

## 2021-06-18 DIAGNOSIS — I25.5 ISCHEMIC CARDIOMYOPATHY: ICD-10-CM

## 2021-06-18 DIAGNOSIS — I50.22 CHRONIC SYSTOLIC HEART FAILURE (HCC): ICD-10-CM

## 2021-06-18 LAB
ANION GAP SERPL CALCULATED.3IONS-SCNC: 9 MMOL/L (ref 3–16)
BUN BLDV-MCNC: 58 MG/DL (ref 7–20)
CALCIUM SERPL-MCNC: 9.5 MG/DL (ref 8.3–10.6)
CHLORIDE BLD-SCNC: 97 MMOL/L (ref 99–110)
CO2: 29 MMOL/L (ref 21–32)
CREAT SERPL-MCNC: 1.4 MG/DL (ref 0.8–1.3)
FERRITIN: 215.8 NG/ML (ref 30–400)
GFR AFRICAN AMERICAN: 59
GFR NON-AFRICAN AMERICAN: 49
GLUCOSE BLD-MCNC: 175 MG/DL (ref 70–99)
HCT VFR BLD CALC: 39 % (ref 40.5–52.5)
HEMOGLOBIN: 13.2 G/DL (ref 13.5–17.5)
IRON SATURATION: 22 % (ref 20–50)
IRON: 56 UG/DL (ref 59–158)
MAGNESIUM: 2.6 MG/DL (ref 1.8–2.4)
MCH RBC QN AUTO: 28.8 PG (ref 26–34)
MCHC RBC AUTO-ENTMCNC: 33.8 G/DL (ref 31–36)
MCV RBC AUTO: 85.2 FL (ref 80–100)
PDW BLD-RTO: 15.3 % (ref 12.4–15.4)
PLATELET # BLD: 214 K/UL (ref 135–450)
PMV BLD AUTO: 9.9 FL (ref 5–10.5)
POTASSIUM SERPL-SCNC: 5.2 MMOL/L (ref 3.5–5.1)
PRO-BNP: 2576 PG/ML (ref 0–449)
RBC # BLD: 4.58 M/UL (ref 4.2–5.9)
SODIUM BLD-SCNC: 135 MMOL/L (ref 136–145)
TOTAL IRON BINDING CAPACITY: 249 UG/DL (ref 260–445)
WBC # BLD: 9.2 K/UL (ref 4–11)

## 2021-06-18 PROCEDURE — 82728 ASSAY OF FERRITIN: CPT

## 2021-06-18 PROCEDURE — 83550 IRON BINDING TEST: CPT

## 2021-06-18 PROCEDURE — 83880 ASSAY OF NATRIURETIC PEPTIDE: CPT

## 2021-06-18 PROCEDURE — 36415 COLL VENOUS BLD VENIPUNCTURE: CPT

## 2021-06-18 PROCEDURE — 85027 COMPLETE CBC AUTOMATED: CPT

## 2021-06-18 PROCEDURE — 83540 ASSAY OF IRON: CPT

## 2021-06-18 PROCEDURE — 80048 BASIC METABOLIC PNL TOTAL CA: CPT

## 2021-06-18 PROCEDURE — 83735 ASSAY OF MAGNESIUM: CPT

## 2021-06-18 NOTE — TELEPHONE ENCOUNTER
6-18-21 LM at [phone] informing pt's son True Ingram to return call to schedule ov with NPRB at [facility], as of now appt held for 7-12-21 at 2p at [facility].

## 2021-06-18 NOTE — TELEPHONE ENCOUNTER
Berto called back and scheduled pt to see NPRB at [place] for 7-12-21 at 2p and we also went ahead and scheduled with MARCIA for the first available location and that is [facility] for 8-24-21 at 215pm.

## 2021-06-21 ENCOUNTER — PATIENT MESSAGE (OUTPATIENT)
Dept: CARDIOLOGY CLINIC | Age: 81
End: 2021-06-21

## 2021-06-21 DIAGNOSIS — I50.22 CHRONIC SYSTOLIC HEART FAILURE (HCC): Primary | ICD-10-CM

## 2021-06-21 RX ORDER — TORSEMIDE 100 MG/1
50 TABLET ORAL DAILY
Qty: 30 TABLET | Refills: 3
Start: 2021-06-21 | End: 2021-08-04

## 2021-06-21 RX ORDER — SPIRONOLACTONE 25 MG/1
12.5 TABLET ORAL DAILY
Qty: 30 TABLET | Refills: 5
Start: 2021-06-21 | End: 2021-11-17

## 2021-06-21 NOTE — TELEPHONE ENCOUNTER
From: Lexie Serna  To: ROLO Spence - CNP  Sent: 6/21/2021 9:58 AM EDT  Subject: Visit Follow-Up Question    Hi Ms. Camejo Screen,    As discussed during my dad's (Jv Gómez) visits, we have done the blood work on Friday and the results are already in. Please advise us for any medicine change:    His blood pressure this morning was 104/71 and his weight was 152lbs.     [facility]  [phone]

## 2021-06-28 ENCOUNTER — PATIENT MESSAGE (OUTPATIENT)
Dept: CARDIOLOGY CLINIC | Age: 81
End: 2021-06-28

## 2021-06-28 ENCOUNTER — HOSPITAL ENCOUNTER (OUTPATIENT)
Age: 81
Discharge: HOME | End: 2021-06-28
Payer: COMMERCIAL

## 2021-06-28 DIAGNOSIS — I50.22 CHRONIC SYSTOLIC HEART FAILURE (HCC): ICD-10-CM

## 2021-06-28 DIAGNOSIS — I25.5 ISCHEMIC CARDIOMYOPATHY: Primary | ICD-10-CM

## 2021-06-28 LAB
ANION GAP SERPL CALCULATED.3IONS-SCNC: 17 MMOL/L (ref 3–16)
BUN BLDV-MCNC: 44 MG/DL (ref 7–20)
CALCIUM SERPL-MCNC: 9.3 MG/DL (ref 8.3–10.6)
CHLORIDE BLD-SCNC: 95 MMOL/L (ref 99–110)
CO2: 26 MMOL/L (ref 21–32)
CREAT SERPL-MCNC: 1.2 MG/DL (ref 0.8–1.3)
GFR AFRICAN AMERICAN: >60
GFR NON-AFRICAN AMERICAN: 58
GLUCOSE BLD-MCNC: 267 MG/DL (ref 70–99)
POTASSIUM SERPL-SCNC: 5.1 MMOL/L (ref 3.5–5.1)
PRO-BNP: 2834 PG/ML (ref 0–449)
SODIUM BLD-SCNC: 138 MMOL/L (ref 136–145)

## 2021-06-28 PROCEDURE — 83880 ASSAY OF NATRIURETIC PEPTIDE: CPT

## 2021-06-28 PROCEDURE — 36415 COLL VENOUS BLD VENIPUNCTURE: CPT

## 2021-06-28 PROCEDURE — 80048 BASIC METABOLIC PNL TOTAL CA: CPT

## 2021-06-29 NOTE — TELEPHONE ENCOUNTER
From: Elisa Vasquesi  To: ROLO Norton - CNP  Sent: 6/28/2021 9:25 PM EDT  Subject: Visit Follow-Up Question    Hi Ms. Leonardo Saavedra,    As per your instructions, we have changed my dad's medicine effective Monday 6/21 afternoon and today we got his blood work done.  Attached please find BP and Weight

## 2021-07-23 NOTE — TELEPHONE ENCOUNTER
Covering for Yvette  Please let patient know that labs are ordered and they can have them drawn prior to appointment.  Thanks!p

## 2021-08-02 ENCOUNTER — HOSPITAL ENCOUNTER (OUTPATIENT)
Age: 81
Discharge: HOME | End: 2021-08-02
Payer: COMMERCIAL

## 2021-08-02 DIAGNOSIS — I50.22 CHRONIC SYSTOLIC HEART FAILURE (HCC): ICD-10-CM

## 2021-08-02 DIAGNOSIS — I25.5 ISCHEMIC CARDIOMYOPATHY: ICD-10-CM

## 2021-08-02 LAB
ANION GAP SERPL CALCULATED.3IONS-SCNC: 15 MMOL/L (ref 3–16)
BUN BLDV-MCNC: 36 MG/DL (ref 7–20)
CALCIUM SERPL-MCNC: 9.3 MG/DL (ref 8.3–10.6)
CHLORIDE BLD-SCNC: 96 MMOL/L (ref 99–110)
CO2: 26 MMOL/L (ref 21–32)
CREAT SERPL-MCNC: 1.2 MG/DL (ref 0.8–1.3)
GFR AFRICAN AMERICAN: >60
GFR NON-AFRICAN AMERICAN: 58
GLUCOSE BLD-MCNC: 208 MG/DL (ref 70–99)
POTASSIUM SERPL-SCNC: 4.7 MMOL/L (ref 3.5–5.1)
PRO-BNP: 2660 PG/ML (ref 0–449)
SODIUM BLD-SCNC: 137 MMOL/L (ref 136–145)

## 2021-08-02 PROCEDURE — 80048 BASIC METABOLIC PNL TOTAL CA: CPT

## 2021-08-02 PROCEDURE — 83880 ASSAY OF NATRIURETIC PEPTIDE: CPT

## 2021-08-02 PROCEDURE — 36415 COLL VENOUS BLD VENIPUNCTURE: CPT

## 2021-08-03 ENCOUNTER — TELEPHONE (OUTPATIENT)
Dept: CARDIOLOGY CLINIC | Age: 81
End: 2021-08-03

## 2021-08-03 NOTE — TELEPHONE ENCOUNTER
----- Message from Melba Romberg, APRN - CNP sent at 8/3/2021  8:18 AM EDT -----  Covering for Yvette  Please let patient know that kidney function/potassium/fluid number stable-improved. Follow up as planned tomorrow. Thanks!

## 2021-08-04 ENCOUNTER — OFFICE VISIT (OUTPATIENT)
Dept: CARDIOLOGY CLINIC | Age: 81
End: 2021-08-04
Payer: COMMERCIAL

## 2021-08-04 VITALS
DIASTOLIC BLOOD PRESSURE: 50 MMHG | WEIGHT: 161.5 LBS | HEART RATE: 93 BPM | HEIGHT: 65 IN | SYSTOLIC BLOOD PRESSURE: 90 MMHG | OXYGEN SATURATION: 95 % | BODY MASS INDEX: 26.91 KG/M2

## 2021-08-04 DIAGNOSIS — I25.10 CAD S/P PERCUTANEOUS CORONARY ANGIOPLASTY: ICD-10-CM

## 2021-08-04 DIAGNOSIS — I25.5 ISCHEMIC CARDIOMYOPATHY: Primary | ICD-10-CM

## 2021-08-04 DIAGNOSIS — Z98.61 CAD S/P PERCUTANEOUS CORONARY ANGIOPLASTY: ICD-10-CM

## 2021-08-04 DIAGNOSIS — I50.22 CHRONIC SYSTOLIC HEART FAILURE (HCC): ICD-10-CM

## 2021-08-04 PROCEDURE — 99214 OFFICE O/P EST MOD 30 MIN: CPT | Performed by: NURSE PRACTITIONER

## 2021-08-04 RX ORDER — TORSEMIDE 100 MG/1
TABLET ORAL
Qty: 30 TABLET | Refills: 3 | Status: SHIPPED | OUTPATIENT
Start: 2021-08-04 | End: 2021-11-05 | Stop reason: DRUGHIGH

## 2021-08-04 RX ORDER — CARVEDILOL 3.12 MG/1
TABLET ORAL
Qty: 90 TABLET | Refills: 1 | Status: SHIPPED | OUTPATIENT
Start: 2021-08-04 | End: 2021-12-10 | Stop reason: ALTCHOICE

## 2021-08-04 NOTE — PROGRESS NOTES
[address]   Cardiac Follow-up    Primary Care Doctor:  Rosalio Trevino MD    Chief Complaint   Patient presents with    Follow-up    Congestive Heart Failure    Shortness of Breath        History of Present Illness:   I had the pleasure of seeing Randal Kim in follow up for cardiomyopathy  History of CAD status post PTCA of circumflex/OM1 with JONANA, HTN, HLD. Echocardiogram 2018 EF 45-50%. Chest CT showed pulmonary fibrosis March 2021, seen by pulmonary, pulmonary function test showed restrictive lung disease. Echocardiogram April 2021 with a EF 20%, stress test 4/13/2021 was abnormal.  Status post right and left heart cath 4/20/2021, successful PCI to LAD with JOANNA. Carvedilol increased 6/2/21  Metolazone started of May 2021-weight decreased from 160 pounds down to 151-152lbs    Since last visit, repeat labs showed elevated BUN and creatinine; Spironolactone (aldactone) was held for a 1 day and then reduced to 1/2 tab daily, torsemide was also reduced to 50mg daily. History is obtained with the help of his son present during the visit. He complains of dry mouth. He has a heavy head, but no diziness. Mild shortness of breath; breathing is overall improving. He is walking more. He drinks a lot of extra fluid, son was trying to keep fluid restriction in check, but also feels like if he is thirsty he should be able to drink. Patient having a  bloated belly   104/74 home b/p; continues on the entresto. Remains off of the oxygen during the day. Home weight 156lbs- slowly increasing. Rowena Serna describes symptoms including dyspnea but denies syncope. Past Medical History:   has a past medical history of Acute MI ([address] CAD (coronary artery disease), CHF (congestive heart failure), NYHA class III ([address] Diabetes mellitus ([address] HTN (hypertension), and Hyperlipidemia. Surgical History:   has a past surgical history that includes Cardiac surgery; eye surgery (Bilateral);  Carotid endarterectomy (Left, 11/02/2016); and Coronary angioplasty with stent (05/31/2018). Social History:   reports that he has never smoked. He has never used smokeless tobacco. He reports that he does not drink alcohol and does not use drugs. Family History:   Family History   Problem Relation Age of Onset    Heart Attack Brother     Heart Disease Brother     Heart Attack Brother        Home Medications:  Prior to Admission medications    Medication Sig Start Date End Date Taking?  Authorizing Provider   VITAMIN D PO Take by mouth   Yes Historical Provider, MD   torsemide (DEMADEX) 100 MG tablet Take 0.5 tablets by mouth daily 6/21/21  Yes ROLO Stallworth CNP   spironolactone (ALDACTONE) 25 MG tablet Take 0.5 tablets by mouth daily 6/21/21  Yes ROLO Stallworth CNP   carvedilol (COREG) 6.25 MG tablet TAKE ONE TABLET BY MOUTH TWICE A DAY WITH MEALS 6/17/21  Yes ROLO Stallworth CNP   OXYGEN Inhale into the lungs 2.5 L at HS   Yes Historical Provider, MD   sacubitril-valsartan (ENTRESTO) 24-26 MG per tablet Take 1 tablet by mouth 2 times daily  Patient taking differently: Take 1 tablet by mouth 2 times daily Taking 1/2 tablet bid 5/14/21  Yes Phillip Schwartz MD   ipratropium-albuterol (DUONEB) 0.5-2.5 (3) MG/3ML SOLN nebulizer solution Inhale 3 mLs into the lungs every 4 hours 5/6/21  Yes La Jones MD   albuterol sulfate HFA (VENTOLIN HFA) 108 (90 Base) MCG/ACT inhaler Inhale 2 puffs into the lungs 4 times daily as needed for Wheezing 4/13/21  Yes La Jones MD   atorvastatin (LIPITOR) 40 MG tablet TAKE ONE TABLET BY MOUTH DAILY 3/9/20  Yes Phillip Schwartz MD   insulin glargine (BASAGLAR KWIKPEN) 100 UNIT/ML injection pen Inject 50 Units into the skin nightly    Yes Historical Provider, MD   dapagliflozin (FARXIGA) 5 MG tablet Take 5 mg by mouth every morning   Yes Historical Provider, MD   amitriptyline (ELAVIL) 25 MG tablet Take 25 mg by mouth nightly   Yes Historical Provider, MD   clopidogrel (PLAVIX) 75 MG tablet Take 1 tablet by mouth daily 2/7/19  Yes Tori Miles MD   tolterodine (DETROL) 2 MG tablet Take 2 mg by mouth 2 times daily   Yes Historical Provider, MD   Dulaglutide (TRULICITY) 5.09 RI/3.2SL SOPN Inject into the skin   Yes Historical Provider, MD   famotidine (PEPCID) 20 MG tablet Take 20 mg by mouth 2 times daily   Yes Historical Provider, MD   nitroGLYCERIN (NITROSTAT) 0.4 MG SL tablet Place 1 tablet under the tongue every 5 minutes as needed for Chest pain (No more than 3 tablets in a 15 minute period. ). 6/12/14  Yes Tori Miles MD   metformin (GLUCOPHAGE) 1000 MG tablet Take 1 tablet by mouth 2 times daily (with meals). Patient taking differently: Take 500 mg by mouth 2 times daily (with meals)  10/28/11  Yes Conchita Gurrola MD   aspirin 81 MG EC tablet Take 81 mg by mouth daily. Yes Historical Provider, MD   ferrous sulfate 325 (65 FE) MG tablet Take 325 mg by mouth 2 times daily. Yes Historical Provider, MD   gabapentin (NEURONTIN) 600 MG tablet Take 600 mg by mouth 2 times daily. .   Yes Historical Provider, MD   therapeutic multivitamin-minerals (THERAGRAN-M) tablet Take 2 tablets by mouth daily. Yes Historical Provider, MD   metOLazone (ZAROXOLYN) 5 MG tablet Take 1 tablet by mouth daily as needed (swelling)  Patient not taking: Reported on 8/4/2021 5/28/21   Tori Miles MD   Nebulizers MISC by Does not apply route    Historical Provider, MD   [facility] 100 MG TABS tablet  4/3/18   Historical Provider, MD   glucose blood VI test strips (ASCENSIA AUTODISC VI;ONE TOUCH ULTRA TEST VI) strip 1 each by Does not apply route daily. As needed.     Historical Provider, MD        Allergies:  Penicillins     Physical Examination:    Vitals:    08/04/21 1530 08/04/21 1541   BP: (!) 82/44 (!) 89/53   Pulse: 93    SpO2: 95%    Weight: 161 lb 8 oz (73.3 kg)    Height: 5' 5\" (1.651 m)         Constitutional and General Appearance: no apparent distress  HEENT: non-icteric sclera, mask in place  Neck: JVP less than 8-9 cm H20  Respiratory:  · No use of accessory muscles  · Crackles breath sounds throughout, no wheezing, no rhonchi  Cardiovascular:  · The apical impulses not displaced  · no murmur/rub/gallop  · Regular rate and rhythm, S1,S2 normal  · Radial pulses 2+ and equal bilaterally  · No edema  · Pedal Pulses: 2+ and equal   Abdomen: Abdomen soft, mild bloating   · No masses or tenderness  · Liver: No Abnormalities Noted  Musculoskeletal/Skin:  · Gait is slow  · There is no clubbing, cyanosis of the extremities  · Skin is warm and dry  · Moves all extremities well  Neurological/Psychiatric:  · Alert and oriented, answering questions appropriately  · No abnormalities of mood, affect    Lab Data reviewed and analyzed   CBC:   Lab Results   Component Value Date    WBC 9.2 06/18/2021    WBC 11.0 04/28/2021    WBC 9.4 03/04/2021    RBC 4.58 06/18/2021    RBC 4.60 04/28/2021    RBC 4.88 03/04/2021    HGB 13.2 06/18/2021    HGB 13.2 04/28/2021    HGB 14.2 03/04/2021    HCT 39.0 06/18/2021    HCT 39.9 04/28/2021    HCT 43.5 03/04/2021    MCV 85.2 06/18/2021    MCV 86.8 04/28/2021    MCV 89.1 03/04/2021    RDW 15.3 06/18/2021    RDW 14.8 04/28/2021    RDW 15.0 03/04/2021     06/18/2021     04/28/2021     03/04/2021     Iron:   Lab Results   Component Value Date    IRON 56 (L) 06/18/2021    TIBC 249 (L) 06/18/2021    FERRITIN 215.8 06/18/2021     BMP:   Lab Results   Component Value Date     08/02/2021     06/28/2021     06/18/2021    K 4.7 08/02/2021    K 5.1 06/28/2021    K 5.2 06/18/2021    K 3.7 03/04/2021    CL 96 08/02/2021    CL 95 06/28/2021    CL 97 06/18/2021    CO2 26 08/02/2021    CO2 26 06/28/2021    CO2 29 06/18/2021    BUN 36 08/02/2021    BUN 44 06/28/2021    BUN 58 06/18/2021    CREATININE 1.2 08/02/2021    CREATININE 1.2 06/28/2021    CREATININE 1.4 06/18/2021     BNP: Lab Results   Component Value Date    PROBNP 2,660 08/02/2021    PROBNP 2,834 06/28/2021    PROBNP 2,576 06/18/2021     Lipids:   Lab Results   Component Value Date    CHOL 115 08/18/2017        Lab Results   Component Value Date    TRIG 130 08/18/2017        Lab Results   Component Value Date    HDL 41 08/18/2017        Lab Results   Component Value Date    LDLCALC 48 08/18/2017        Lab Results   Component Value Date    LABVLDL 26 08/18/2017      No results found for: CHOLHDLRATIO    EF:   Lab Results   Component Value Date    LVEF 20 04/28/2021    LVEFMODE Cardiac Cath 04/28/2021       Testing reviewed:  ECHO: 4/2016   Normal left ventricle size, wall thickness and systolic function with an   estimated ejection fraction of 55%. No regional wall motion abnormalities are seen.   Diastolic filling parameters suggests grade I diastolic dysfunction.   The aortic valve appears thickened/calcified with decreased leaflet mobility   but no significant stenosis by velocity measurements.   The aortic valve is tricuspid .   The maximum pressure gradient of 10 mmHg and a mean pressure gradient of 5 mmHg.   Trace aortic valve regurgitation.   MIld mitral and tricuspid regurgitation.   Systolic pulmonary artery pressure (SPAP) is normal and estimated at 27 mmHg (RA pressure 3 mmHg). Cath 8/2017  LM 20%  LAD prox stent patent, mid 40%  Cx 20%              OM1 95%              OM2 90%  RCA 40%              RPDA 99% ostium              RPL 50% ostium and distal  LVEF 55%  Normal right heart pressures     PTCA OM1              2.0 x 15 angiosculpt cutting balloon  PTCA OM3              2.0 x 6 cutting balloon  PTCA RPDA              2.0 x 6 cutting balloon     Echo 5/2018  Left ventricular cavity size is normal. Normal left ventricular wall  thickness. Left ventricular function is low normal with ejection fraction  estimated at 45-50%. Posterolateral wall appears hypokinetic.  Diastolic  filling parameters suggest grade I diastolic dysfunction. Thickening of leaflets of mitral valve. Mitral annular calcification is  present. Mild mitral regurgitation is present. The left atrium is dilated. Aortic valve appears sclerotic but opens adequately. Mild to moderate tricuspid regurgitation. Estimated pulmonary artery  systolic pressure is at 40 mmHg     Right and left heart cath 5/31/18  LM 20%  LAD 30% prox in stent  Cx 95% prox extending into OM1              OM1 95% ostium  RCA 50%              RPL 50%  LVEF 40%  RA 4,  RV 23/2,  PA 22/5/11,  W 2     PTCA Cx/OM1              2.0 x 12 JOANNA  Low right heart pressures noted assoc w/ hypotension. Hold torsemide. Reduce to 20 mg daily at discharge.      Echocardiogram 4/9/2021  Summary   LV systolic function is severlly reduced with EF estimated at 20%.   Severe global HK overall.   Diastolic filling pressures appear normal.   Aortic valve appears sclerotic but opens adequately.   Mitral valve leaflets appear mildly thickened.   Mild mitral and tricuspid regurgitation.   Systolic pulmonary artery pressure (SPAP) is estimated at 32mmHg (right   atrial pressure 3 mmHg). Stress test 4/13/2021  Conclusions    Summary  LV enlargement with severe global hypokinesis and EF 21  Medium-large sized anteroseptal fixed defect of moderate intensity  consistent with infarction in the territory of the proximal LAD .  Medium-large sized inferoseptal fixed defect of moderate intensity  consistent with infarction in the territory of the mid and distal LCx and/or  RCA .      Right and left cath Dr. Jose Abdi 4/28/2021  Cardiac Cath PCI, LVG, RHC:  Anatomy:   LM-mid 20%   LAD-prox 90% ISR  Cx-small diffuse up to 50%  RCA-mid 20%, distal 50%  RPDA- nml  LVEF- 20%  LVG- severe apical, anterior hypokinesis  LVEDP- 14     Hemodynamics:  RA- mean 10/8 (7)  RV- 51/0  PAWP-31   PA- 51/23     C. O. 4.04/3.54  C. I. 2.25/1.97  PA Sat 57  AO Sat 90     Intervention  ~Successful PCI to LAD with 3.0x32 JOANNA.  PD with 3.5x15 NC to 22atm. Excellent Result. NYHA:   III  ACC/ AHA Stage:    c    Assessment:  · Heart failure with reduced ejection fraction  · Ischemic/nonischemic cardiomyopathy  · CAD status post PCI to LAD  · Chronic left bundle branch block;   · HTN  · Carotid stenosis s/p L-CEA  · Pulmonary fibrosis    Visit Diagnosis:    1. Ischemic cardiomyopathy    2. Chronic systolic heart failure ([address]    3. CAD S/P percutaneous coronary angioplasty      Plan:   1. Labs reviewed   2. Continue daily weights  3. Continue the 24-26mg 1/2 tab twice a day for now   4. Continue the Spironolactone (aldactone) 12.5 daily  5. Adjust the coreg to 3.125mg in the am and keep the evening dose at 6.25mg   6. Continue torsemide 50mg every day except for take 100mg on thursdays for now (target weight 153-156lbs)   7. Follow up with Dr. Asael Finn as planned     Core measures for HFrEF:  EF: 20%; repeat echo scheduled for Aug 2021  ICD: NA, undergoing med titration, will need to Consider CRT-D vs CRT-P pending EP recommendations   ACEi/ARB/ARNI: Entresto  BB:coreg  Ryne: 12.5  mg Spironolactone (aldactone)  Hydralazine/nitrates:NA due to low b/p     I appreciate the opportunity for caring for this patient.      ROLO Preston - CNP, CNP, 8/4/2021, 3:57 PM

## 2021-08-04 NOTE — LETTER
[facility]   Cardiac Follow-up    Primary Care Doctor:  Mari Mak MD    Chief Complaint   Patient presents with    Follow-up    Congestive Heart Failure    Shortness of Breath        History of Present Illness:   I had the pleasure of seeing Carlos Felix in follow up for cardiomyopathy  History of CAD status post PTCA of circumflex/OM1 with JOANNA, HTN, HLD. Echocardiogram 2018 EF 45-50%. Chest CT showed pulmonary fibrosis March 2021, seen by pulmonary, pulmonary function test showed restrictive lung disease. Echocardiogram April 2021 with a EF 20%, stress test 4/13/2021 was abnormal.  Status post right and left heart cath 4/20/2021, successful PCI to LAD with JOANNA. Carvedilol increased 6/2/21  Metolazone started of May 2021-weight decreased from 160 pounds down to 151-152lbs    Since last visit, repeat labs showed elevated BUN and creatinine; Spironolactone (aldactone) was held for a 1 day and then reduced to 1/2 tab daily, torsemide was also reduced to 50mg daily. History is obtained with the help of his son present during the visit. He complains of dry mouth. He has a heavy head, but no diziness. Mild shortness of breath; breathing is overall improving. He is walking more. He drinks a lot of extra fluid, son was trying to keep fluid restriction in check, but also feels like if he is thirsty he should be able to drink. Patient having a  bloated belly   104/74 home b/p; continues on the entresto. Remains off of the oxygen during the day. Home weight 156lbs- slowly increasing. Keerthi Serna describes symptoms including dyspnea but denies syncope. Past Medical History:   has a past medical history of Acute MI ([address] CAD (coronary artery disease), CHF (congestive heart failure), NYHA class III ([address] Diabetes mellitus ([address] HTN (hypertension), and Hyperlipidemia.   Surgical History:   has a past surgical history that includes Cardiac surgery; eye surgery (Bilateral); Carotid endarterectomy (Left, 11/02/2016); and Coronary angioplasty with stent (05/31/2018). Social History:   reports that he has never smoked. He has never used smokeless tobacco. He reports that he does not drink alcohol and does not use drugs. Family History:   Family History   Problem Relation Age of Onset    Heart Attack Brother     Heart Disease Brother     Heart Attack Brother        Home Medications:  Prior to Admission medications    Medication Sig Start Date End Date Taking?  Authorizing Provider   VITAMIN D PO Take by mouth   Yes Historical Provider, MD   torsemide (DEMADEX) 100 MG tablet Take 0.5 tablets by mouth daily 6/21/21  Yes Garfield Kocher, APRN - CNP   spironolactone (ALDACTONE) 25 MG tablet Take 0.5 tablets by mouth daily 6/21/21  Yes Garfield Kocher, APRN - CNP   carvedilol (COREG) 6.25 MG tablet TAKE ONE TABLET BY MOUTH TWICE A DAY WITH MEALS 6/17/21  Yes Garfield Kocher, APRN - CNP   OXYGEN Inhale into the lungs 2.5 L at HS   Yes Historical Provider, MD   sacubitril-valsartan (ENTRESTO) 24-26 MG per tablet Take 1 tablet by mouth 2 times daily  Patient taking differently: Take 1 tablet by mouth 2 times daily Taking 1/2 tablet bid 5/14/21  Yes Pk Lopez MD   ipratropium-albuterol (DUONEB) 0.5-2.5 (3) MG/3ML SOLN nebulizer solution Inhale 3 mLs into the lungs every 4 hours 5/6/21  Yes Apolinar Castro MD   albuterol sulfate HFA (VENTOLIN HFA) 108 (90 Base) MCG/ACT inhaler Inhale 2 puffs into the lungs 4 times daily as needed for Wheezing 4/13/21  Yes Apolinar Castro MD   atorvastatin (LIPITOR) 40 MG tablet TAKE ONE TABLET BY MOUTH DAILY 3/9/20  Yes Pk Lopez MD   insulin glargine (BASAGLAR KWIKPEN) 100 UNIT/ML injection pen Inject 50 Units into the skin nightly    Yes Historical Provider, MD   dapagliflozin (FARXIGA) 5 MG tablet Take 5 mg by mouth every morning   Yes Historical Provider, MD   amitriptyline (ELAVIL) 25 MG tablet Take 25 mg by mouth nightly   Yes Historical Provider, MD   clopidogrel (PLAVIX) 75 MG tablet Take 1 tablet by mouth daily 2/7/19  Yes Noble Holt MD   tolterodine (DETROL) 2 MG tablet Take 2 mg by mouth 2 times daily   Yes Historical Provider, MD   Dulaglutide (TRULICITY) 6.36 GS/0.0SK SOPN Inject into the skin   Yes Historical Provider, MD   famotidine (PEPCID) 20 MG tablet Take 20 mg by mouth 2 times daily   Yes Historical Provider, MD   nitroGLYCERIN (NITROSTAT) 0.4 MG SL tablet Place 1 tablet under the tongue every 5 minutes as needed for Chest pain (No more than 3 tablets in a 15 minute period. ). 6/12/14  Yes Noble Holt MD   metformin (GLUCOPHAGE) 1000 MG tablet Take 1 tablet by mouth 2 times daily (with meals). Patient taking differently: Take 500 mg by mouth 2 times daily (with meals)  10/28/11  Yes Sheila Edwards MD   aspirin 81 MG EC tablet Take 81 mg by mouth daily. Yes Historical Provider, MD   ferrous sulfate 325 (65 FE) MG tablet Take 325 mg by mouth 2 times daily. Yes Historical Provider, MD   gabapentin (NEURONTIN) 600 MG tablet Take 600 mg by mouth 2 times daily. .   Yes Historical Provider, MD   therapeutic multivitamin-minerals (THERAGRAN-M) tablet Take 2 tablets by mouth daily. Yes Historical Provider, MD   metOLazone (ZAROXOLYN) 5 MG tablet Take 1 tablet by mouth daily as needed (swelling)  Patient not taking: Reported on 8/4/2021 5/28/21   Noble Holt MD   Nebulizers MISC by Does not apply route    Historical Provider, MD   [facility] 100 MG TABS tablet  4/3/18   Historical Provider, MD   glucose blood VI test strips (ASCENSIA AUTODISC VI;ONE TOUCH ULTRA TEST VI) strip 1 each by Does not apply route daily. As needed.     Historical Provider, MD        Allergies:  Penicillins     Physical Examination:    Vitals:    08/04/21 1530 08/04/21 1541   BP: (!) 82/44 (!) 89/53   Pulse: 93    SpO2: 95%    Weight: 161 lb 8 oz (73.3 kg)    Height: 5' 5\" (1.651 m)         Constitutional and General Appearance: no apparent distress  HEENT: non-icteric sclera, mask in place  Neck: JVP less than 8-9 cm H20  Respiratory:  · No use of accessory muscles  · Crackles breath sounds throughout, no wheezing, no rhonchi  Cardiovascular:  · The apical impulses not displaced  · no murmur/rub/gallop  · Regular rate and rhythm, S1,S2 normal  · Radial pulses 2+ and equal bilaterally  · No edema  · Pedal Pulses: 2+ and equal   Abdomen: Abdomen soft, mild bloating   · No masses or tenderness  · Liver: No Abnormalities Noted  Musculoskeletal/Skin:  · Gait is slow  · There is no clubbing, cyanosis of the extremities  · Skin is warm and dry  · Moves all extremities well  Neurological/Psychiatric:  · Alert and oriented, answering questions appropriately  · No abnormalities of mood, affect    Lab Data reviewed and analyzed   CBC:   Lab Results   Component Value Date    WBC 9.2 06/18/2021    WBC 11.0 04/28/2021    WBC 9.4 03/04/2021    RBC 4.58 06/18/2021    RBC 4.60 04/28/2021    RBC 4.88 03/04/2021    HGB 13.2 06/18/2021    HGB 13.2 04/28/2021    HGB 14.2 03/04/2021    HCT 39.0 06/18/2021    HCT 39.9 04/28/2021    HCT 43.5 03/04/2021    MCV 85.2 06/18/2021    MCV 86.8 04/28/2021    MCV 89.1 03/04/2021    RDW 15.3 06/18/2021    RDW 14.8 04/28/2021    RDW 15.0 03/04/2021     06/18/2021     04/28/2021     03/04/2021     Iron:   Lab Results   Component Value Date    IRON 56 (L) 06/18/2021    TIBC 249 (L) 06/18/2021    FERRITIN 215.8 06/18/2021     BMP:   Lab Results   Component Value Date     08/02/2021     06/28/2021     06/18/2021    K 4.7 08/02/2021    K 5.1 06/28/2021    K 5.2 06/18/2021    K 3.7 03/04/2021    CL 96 08/02/2021    CL 95 06/28/2021    CL 97 06/18/2021    CO2 26 08/02/2021    CO2 26 06/28/2021    CO2 29 06/18/2021    BUN 36 08/02/2021    BUN 44 06/28/2021    BUN 58 06/18/2021    CREATININE 1.2 08/02/2021    CREATININE 1.2 06/28/2021    CREATININE 1.4 06/18/2021     BNP: diastolic dysfunction. Thickening of leaflets of mitral valve. Mitral annular calcification is  present. Mild mitral regurgitation is present. The left atrium is dilated. Aortic valve appears sclerotic but opens adequately. Mild to moderate tricuspid regurgitation. Estimated pulmonary artery  systolic pressure is at 40 mmHg     Right and left heart cath 5/31/18  LM 20%  LAD 30% prox in stent  Cx 95% prox extending into OM1              OM1 95% ostium  RCA 50%              RPL 50%  LVEF 40%  RA 4,  RV 23/2,  PA 22/5/11,  W 2     PTCA Cx/OM1              2.0 x 12 JOANNA  Low right heart pressures noted assoc w/ hypotension. Hold torsemide. Reduce to 20 mg daily at discharge.      Echocardiogram 4/9/2021  Summary   LV systolic function is severlly reduced with EF estimated at 20%.   Severe global HK overall.   Diastolic filling pressures appear normal.   Aortic valve appears sclerotic but opens adequately.   Mitral valve leaflets appear mildly thickened.   Mild mitral and tricuspid regurgitation.   Systolic pulmonary artery pressure (SPAP) is estimated at 32mmHg (right   atrial pressure 3 mmHg). Stress test 4/13/2021  Conclusions    Summary  LV enlargement with severe global hypokinesis and EF 21  Medium-large sized anteroseptal fixed defect of moderate intensity  consistent with infarction in the territory of the proximal LAD .  Medium-large sized inferoseptal fixed defect of moderate intensity  consistent with infarction in the territory of the mid and distal LCx and/or  RCA .      Right and left cath Dr. Forest Alves 4/28/2021  Cardiac Cath PCI, LVG, RHC:  Anatomy:   LM-mid 20%   LAD-prox 90% ISR  Cx-small diffuse up to 50%  RCA-mid 20%, distal 50%  RPDA- nml  LVEF- 20%  LVG- severe apical, anterior hypokinesis  LVEDP- 14     Hemodynamics:  RA- mean 10/8 (7)  RV- 51/0  PAWP-31   PA- 51/23     C. O. 4.04/3.54  C. I. 2.25/1.97  PA Sat 57  AO Sat 90     Intervention  ~Successful PCI to LAD with 3.0x32 JOANNA.  PD with 3.5x15 NC to 22atm. Excellent Result. NYHA:   III  ACC/ AHA Stage:    c    Assessment:  · Heart failure with reduced ejection fraction  · Ischemic/nonischemic cardiomyopathy  · CAD status post PCI to LAD  · Chronic left bundle branch block;   · HTN  · Carotid stenosis s/p LCEA  · Pulmonary fibrosis    Visit Diagnosis:    1. Ischemic cardiomyopathy    2. Chronic systolic heart failure ([address]    3. CAD S/P percutaneous coronary angioplasty      Plan:   1. Labs reviewed   2. Continue daily weights  3. Continue the 24-26mg 1/2 tab twice a day for now   4. Continue the Spironolactone (aldactone) 12.5 daily  5. Adjust the coreg to 3.125mg in the am and keep the evening dose at 6.25mg   6. Continue torsemide 50mg every day except for take 100mg on thursdays for now (target weight 153-156lbs)   7. Follow up with Dr. Haider Lilly as planned     Core measures for HFrEF:  EF: 20%; repeat echo scheduled for Aug 2021  ICD: NA, undergoing med titration, will need to Consider CRT-D vs CRT-P pending EP recommendations   ACEi/ARB/ARNI: Entresto  BB:coreg  Ryne: 12.5  mg Spironolactone (aldactone)  Hydralazine/nitrates:NA due to low b/p     I appreciate the opportunity for caring for this patient.      ROLO Carrasco - CNP, CNP, 8/4/2021, 3:57 PM

## 2021-08-17 NOTE — PROGRESS NOTES
[facility]   Electrophysiology Consultation   Date: 8/19/2021  Reason for Consultation: Ischemic Cardiomyopathy   Consult Requesting Physician: Teresa Gates  Chief Complaint   Patient presents with    New Patient    Cardiomyopathy     discuss Biv ICD        CC: Ischemic Cardiomyopathy   HPI: Severa Maul is a 80 y.o. male with a PMH of ischemic CM, HTN, CAD and chronic HF    s/p left heart cath 2017 and then a repeat right and left cath 2018 with PTCA Cx/OM1 with 2.0 x 12 JOANNA. He presented to the ER  (3/5/2021) with complaints of SOB and concerns by PCP for atrial fib. EKG showed NSR with LBBB, BNP 3,304, CT chest showed no evidence of PE, interstitial fibrosis. Patient had been visiting [facility] and returned home with SOB and hypoxia. COVID testing negative 3/9/2021. He was referred by pulmonary for an abnormal CT, LEWIS, and Pulmonary fibrosis. He was seen by Dr. Zackary Nieto MD. He underwent a PTT's with six minute walk test- Restriction noted. Correlate for ILD +/- pulmonary vascular disease. Echocardiogram from 4/9/2021 showed an EF of 20%. Stress test on 4/13/2021 was abnormal.     He underwent a right and left heart cath on 4/28/2021, Successful PCI to LAD with 3.0x32 JOANNA. PD with 3.5x15 NC to 22atm, LVG with LVEF of 20 and LAD regional wall motion abnormalities. At his last visit he reported having increased SOB with even minimal activity. Andrae Regan presents to the office as a new patient. A few years back he was so short of breath he could not even walk without O2. He is now only using O2 at night and his functional status has improved. He is also retaining fluid in his abdomen which his son states increases his shortness of breath. We discussed that if his EF remains under 35% he will qualify for an ICD. His son was in the room and we discussed both with him and his son. Most of the discussion was with the son since the patient speaks very little [place].     Assessment and follow a low salt diet to assure blood pressure remains controlled for cardiovascular risk factor modification.   -The patient is counseled to get regular exercise 3-5 times per week and maintain a healthy weight reduce cardiovascular risk factors. Plan:     Follow-up with echo results and proceed with BiV ICD if EF remains 35% or lower. Patient Active Problem List    Diagnosis Date Noted    Chronic diastolic congestive heart failure ([address] 06/01/2018    CAD S/P percutaneous coronary angioplasty 10/26/2011    Chest pain 10/26/2011    HTN (hypertension) 10/26/2011    Hyperlipidemia 10/26/2011    Ischemic cardiomyopathy     Positive cardiac stress test     CAD in native artery 05/31/2018    Coronary artery disease involving native coronary artery of native heart with unstable angina pectoris (HCC)     Angina, class III ([address]     CHF (congestive heart failure), NYHA class III ([address]     SOB (shortness of breath) 08/17/2017    Bilateral sensorineural hearing loss 05/19/2016    Claudication ([address] 04/21/2016    Bilateral carotid artery stenosis 04/21/2016    Glenohumeral arthritis 01/14/2016    Rotator cuff tear 01/14/2016    Acute upper respiratory infection 12/24/2008    Pain in joint, lower leg 08/22/2008    Encounter for long-term (current) use of other medications 05/15/2008    Mononeuritis 12/31/2007    Anemia 12/19/2007    Type II or unspecified type diabetes mellitus without mention of complication, not stated as uncontrolled 11/05/2007     Diagnostic studies:   ECG 8/19/21  Sinus with PVCs and LBBB QRS of 158    C 4/28/2021  Intervention  ~Successful PCI to LAD with 3.0x32 JOANNA. PD with 3.5x15 NC to 22atm. Excellent Result.      Contrast: 77  Flouro Time: 17.2  Access: R CFA/CFV.  Perc stick in sfa (high bifurcation)     Impression  ~Coronary Angiography w/ severe single vessel CAD  ~LVG with LVEF of 20 and LAD regional wall motion abnormalities  ~Successful complex · Neurological: Alert and oriented. Cranial nerve appears intact, No Gross deficit   · Skin: Skin is warm and dry. No rash noted. · Psychiatric: Has a normal behavior       Review of System:  [x] Full ROS obtained and negative except as mentioned in HPI    Prior to Admission medications    Medication Sig Start Date End Date Taking?  Authorizing Provider   VITAMIN D PO Take by mouth   Yes Historical Provider, MD   carvedilol (COREG) 3.125 MG tablet 1 tab in the am and 2 tabs in the pm 8/4/21  Yes ROLO Carrasco CNP   torsemide [facility]) 100 MG tablet 1/2 tab daily, except take 1 tab on Thursday 8/4/21  Yes ROLO Carrasco CNP   spironolactone (ALDACTONE) 25 MG tablet Take 0.5 tablets by mouth daily 6/21/21  Yes ROLO Carrasco CNP   metOLazone (ZAROXOLYN) 5 MG tablet Take 1 tablet by mouth daily as needed (swelling) 5/28/21  Yes Isaias Knox MD   Nebulizers MISC by Does not apply route   Yes Historical Provider, MD   OXYGEN Inhale into the lungs 2.5 L at HS   Yes Historical Provider, MD   sacubitril-valsartan (ENTRESTO) 24-26 MG per tablet Take 1 tablet by mouth 2 times daily  Patient taking differently: Take 1 tablet by mouth 2 times daily Taking 1/2 tablet bid 5/14/21  Yes Isaias Knox MD   ipratropium-albuterol (DUONEB) 0.5-2.5 (3) MG/3ML SOLN nebulizer solution Inhale 3 mLs into the lungs every 4 hours 5/6/21  Yes Bailey Guzman MD   albuterol sulfate HFA (VENTOLIN HFA) 108 (90 Base) MCG/ACT inhaler Inhale 2 puffs into the lungs 4 times daily as needed for Wheezing 4/13/21  Yes Bailey Guzman MD   atorvastatin (LIPITOR) 40 MG tablet TAKE ONE TABLET BY MOUTH DAILY 3/9/20  Yes Isaias Knox MD   insulin glargine (BASAGLAR KWIKPEN) 100 UNIT/ML injection pen Inject 50 Units into the skin nightly    Yes Historical Provider, MD   dapagliflozin (FARXIGA) 5 MG tablet Take 5 mg by mouth every morning   Yes Historical Provider, MD   amitriptyline (ELAVIL) 25 MG tablet Take 25 mg by mouth nightly   Yes Historical Provider, MD   clopidogrel (PLAVIX) 75 MG tablet Take 1 tablet by mouth daily 2/7/19  Yes Domingo Abreu MD   tolterodine (DETROL) 2 MG tablet Take 2 mg by mouth 2 times daily   Yes Historical Provider, MD   Dulaglutide (TRULICITY) 0.81 TY/5.2XU SOPN Inject into the skin   Yes Historical Provider, MD   famotidine (PEPCID) 20 MG tablet Take 20 mg by mouth 2 times daily   Yes Historical Provider, MD   nitroGLYCERIN (NITROSTAT) 0.4 MG SL tablet Place 1 tablet under the tongue every 5 minutes as needed for Chest pain (No more than 3 tablets in a 15 minute period. ). 6/12/14  Yes Domingo Abreu MD   glucose blood VI test strips (ASCENSIA AUTODISC VI;ONE TOUCH ULTRA TEST VI) strip 1 each by Does not apply route daily. As needed. Yes Historical Provider, MD   metformin (GLUCOPHAGE) 1000 MG tablet Take 1 tablet by mouth 2 times daily (with meals). Patient taking differently: Take 500 mg by mouth 2 times daily (with meals)  10/28/11  Yes Vitaliy Castrejon MD   aspirin 81 MG EC tablet Take 81 mg by mouth daily. Yes Historical Provider, MD   ferrous sulfate 325 (65 FE) MG tablet Take 325 mg by mouth 2 times daily. Yes Historical Provider, MD   gabapentin (NEURONTIN) 600 MG tablet Take 600 mg by mouth 2 times daily. .   Yes Historical Provider, MD   therapeutic multivitamin-minerals (THERAGRAN-M) tablet Take 2 tablets by mouth daily.    Yes Historical Provider, MD       Past Medical History:   Diagnosis Date    Acute MI ([address]     CAD (coronary artery disease)     CHF (congestive heart failure), NYHA class III ([address]     Diabetes mellitus ([address]     HTN (hypertension) 10/26/2011    Hyperlipidemia 10/26/2011        Past Surgical History:   Procedure Laterality Date    CARDIAC SURGERY      stent  x1   2008    CAROTID ENDARTERECTOMY Left 11/02/2016    LEFT CAROTID ENDARTERECTOMY WITH PATCH ANGIOPLASTY    CORONARY ANGIOPLASTY WITH STENT PLACEMENT  05/31/2018    EYE SURGERY Bilateral     cataracts w/lens       Allergies   Allergen Reactions    Penicillins Itching       Social History:  Reviewed. reports that he has never smoked. He has never used smokeless tobacco. He reports that he does not drink alcohol and does not use drugs. Family History:  Reviewed. Reviewed. No family history of SCD. Relevant and available labs, and cardiovascular diagnostics reviewed. Reviewed. I independently reviewed all cardiac tracing. I independently reviewed relevant and available cardiac diagnostic tests ECG, CXR, Echo, Stress test, Device interrogation, Holter, CT scan. Outside medical records via Care everywhere reviewed and summarized in H&P above. Complex medical condition with multiple medical problems affecting prognosis and outcome of EP interventions       - The patient is counseled to follow a low salt diet to assure blood pressure remains controlled for cardiovascular risk factor modification.   - The patient is counseled to avoid excess caffeine, and energy drinks as this may exacerbated ectopy and arrhythmia. - The patient is counseled to get regular exercise 3-5 times per week to control cardiovascular risk factors. - The patient is counseled to lose weigt to control cardiovascular risk factors. - The patient is counseled to avoid tobacco use. All questions and concerns were addressed to the patient/family. Alternatives to my treatment were discussed. I have discussed the above stated plan and the patient verbalized understanding and agreed with the plan. Scribe attestation: This note was scribed in the presence of Chloe Peterson MD by George Hackett RN    I, Dr. Chloe Peterson personally performed the services described in this documentation as scribed by RN in my presence, and it is both accurate and complete.        NOTE: This report was transcribed using voice recognition software.  Every effort was made to ensure accuracy, however, inadvertent computerized transcription errors may be present.      Yves Jeans, MD, [facility], [address] [place] [address]   Office: [phone]  Fax: [phone]

## 2021-08-19 ENCOUNTER — OFFICE VISIT (OUTPATIENT)
Dept: CARDIOLOGY CLINIC | Age: 81
End: 2021-08-19
Payer: COMMERCIAL

## 2021-08-19 VITALS
SYSTOLIC BLOOD PRESSURE: 121 MMHG | WEIGHT: 162.4 LBS | OXYGEN SATURATION: 94 % | DIASTOLIC BLOOD PRESSURE: 78 MMHG | BODY MASS INDEX: 27.06 KG/M2 | HEART RATE: 108 BPM | HEIGHT: 65 IN

## 2021-08-19 DIAGNOSIS — I25.10 CAD S/P PERCUTANEOUS CORONARY ANGIOPLASTY: ICD-10-CM

## 2021-08-19 DIAGNOSIS — I25.110 CORONARY ARTERY DISEASE INVOLVING NATIVE CORONARY ARTERY OF NATIVE HEART WITH UNSTABLE ANGINA PECTORIS (HCC): ICD-10-CM

## 2021-08-19 DIAGNOSIS — I25.5 ISCHEMIC CARDIOMYOPATHY: Primary | ICD-10-CM

## 2021-08-19 DIAGNOSIS — I44.7 LBBB (LEFT BUNDLE BRANCH BLOCK): ICD-10-CM

## 2021-08-19 DIAGNOSIS — I50.32 CHRONIC DIASTOLIC CONGESTIVE HEART FAILURE, NYHA CLASS 3 (HCC): ICD-10-CM

## 2021-08-19 DIAGNOSIS — Z98.61 CAD S/P PERCUTANEOUS CORONARY ANGIOPLASTY: ICD-10-CM

## 2021-08-19 PROCEDURE — 99205 OFFICE O/P NEW HI 60 MIN: CPT | Performed by: INTERNAL MEDICINE

## 2021-08-19 PROCEDURE — 93000 ELECTROCARDIOGRAM COMPLETE: CPT | Performed by: INTERNAL MEDICINE

## 2021-08-19 NOTE — PATIENT INSTRUCTIONS
If you Ejection Fraction remains below 35% you will be scheduled for a cardiac resynchronization therapy ICD    Our  will call you to discuss a date for you procedure. The Cath Lab will call you a week before your procedure. The night before your procedure you will need to scrub with Hibiclens wash. The day of your procedure you will need to check in at the registration desk, which is in the main lobby at [address] [place] [address] will need to fast for at least 8 hours prior to you procedure. You may take all other medications with a sip of water the morning of your procedure. You will be staying overnight. Please have a responsible adult to drive you home upon discharge. The discharging unit will be giving you discharge instructions. If you have any questions regarding your procedure itself or your medications, please call [phone] and ask to talk to an EP nurse. You will be seen in the office in 1 week for a wound check and then 3 months following implantation. Post-Device Implant     1. Wound Care  -Bathe daily but keep incision dry and clean until your 7-10 day follow up  -Do not shower until you are told to do so by your physician.  -Do not remove the outer dressing unless it is soiled  -Allow the thin pieces of tape (Steri-Strips) to fall off naturally. Do not pick or pull at these unless instructed to do so by your physician. 2. Contact the cardiologists office for these concerns:   -Increased swelling and/or tenderness in incision and/or extending down the arm on the same side as implant site   -Feeling increased palpitations or irregular heart beat   -Redness of incision or drainage from incision.   -Bleeding that does not stop or increases.  -Skin pimples along incision.  -If a suture works its way through the incision.  -Fever greater than 100.5    3. Do not rub or twist the device site    4.  Avoid lifting objects heavier than

## 2021-08-23 ENCOUNTER — HOSPITAL ENCOUNTER (OUTPATIENT)
Age: 81
Discharge: HOME | End: 2021-08-23
Payer: COMMERCIAL

## 2021-08-23 ENCOUNTER — HOSPITAL ENCOUNTER (OUTPATIENT)
Dept: NON INVASIVE DIAGNOSTICS | Age: 81
Discharge: HOME | End: 2021-08-23
Payer: COMMERCIAL

## 2021-08-23 ENCOUNTER — OFFICE VISIT (OUTPATIENT)
Dept: PULMONOLOGY | Age: 81
End: 2021-08-23
Payer: COMMERCIAL

## 2021-08-23 VITALS
SYSTOLIC BLOOD PRESSURE: 98 MMHG | WEIGHT: 162 LBS | BODY MASS INDEX: 26.99 KG/M2 | DIASTOLIC BLOOD PRESSURE: 58 MMHG | OXYGEN SATURATION: 95 % | HEART RATE: 74 BPM | HEIGHT: 65 IN

## 2021-08-23 DIAGNOSIS — R06.02 SOB (SHORTNESS OF BREATH): ICD-10-CM

## 2021-08-23 DIAGNOSIS — I25.5 ISCHEMIC CARDIOMYOPATHY: ICD-10-CM

## 2021-08-23 DIAGNOSIS — I50.22 CHRONIC SYSTOLIC HEART FAILURE (HCC): ICD-10-CM

## 2021-08-23 DIAGNOSIS — J84.112 IPF (IDIOPATHIC PULMONARY FIBROSIS) (HCC): Primary | ICD-10-CM

## 2021-08-23 DIAGNOSIS — I50.23 ACUTE ON CHRONIC SYSTOLIC CONGESTIVE HEART FAILURE (HCC): ICD-10-CM

## 2021-08-23 LAB
ANION GAP SERPL CALCULATED.3IONS-SCNC: 13 MMOL/L (ref 3–16)
BUN BLDV-MCNC: 42 MG/DL (ref 7–20)
CALCIUM SERPL-MCNC: 9.6 MG/DL (ref 8.3–10.6)
CHLORIDE BLD-SCNC: 99 MMOL/L (ref 99–110)
CO2: 27 MMOL/L (ref 21–32)
CREAT SERPL-MCNC: 1.2 MG/DL (ref 0.8–1.3)
GFR AFRICAN AMERICAN: >60
GFR NON-AFRICAN AMERICAN: 58
GLUCOSE BLD-MCNC: 241 MG/DL (ref 70–99)
LV EF: 20 %
LVEF MODALITY: NORMAL
POTASSIUM SERPL-SCNC: 4.8 MMOL/L (ref 3.5–5.1)
PRO-BNP: 5746 PG/ML (ref 0–449)
SODIUM BLD-SCNC: 139 MMOL/L (ref 136–145)

## 2021-08-23 PROCEDURE — 83880 ASSAY OF NATRIURETIC PEPTIDE: CPT

## 2021-08-23 PROCEDURE — 93306 TTE W/DOPPLER COMPLETE: CPT

## 2021-08-23 PROCEDURE — 80048 BASIC METABOLIC PNL TOTAL CA: CPT

## 2021-08-23 PROCEDURE — 36415 COLL VENOUS BLD VENIPUNCTURE: CPT

## 2021-08-23 PROCEDURE — 99214 OFFICE O/P EST MOD 30 MIN: CPT | Performed by: INTERNAL MEDICINE

## 2021-08-23 ASSESSMENT — ENCOUNTER SYMPTOMS
DIARRHEA: 0
SORE THROAT: 0
BACK PAIN: 0
SINUS PRESSURE: 0
BLOOD IN STOOL: 0
ABDOMINAL DISTENTION: 0
WHEEZING: 0
ABDOMINAL PAIN: 0
COUGH: 0
APNEA: 0
SHORTNESS OF BREATH: 1
RHINORRHEA: 0
VOICE CHANGE: 0
CONSTIPATION: 0
CHOKING: 0
STRIDOR: 0
CHEST TIGHTNESS: 0
ANAL BLEEDING: 0

## 2021-08-23 NOTE — PROGRESS NOTES
Ambrocio Musa    YOB: 1940     Date of Service:  8/23/2021     Chief Complaint   Patient presents with    Follow-up     2 month         HPI patient has been accompanied by his son who provided me with most of the history. Issues related to weight gain and fluid retention. No significant chest pain but has dyspnea even with minimal exertion. No significant cough or phlegm. Ischemic cardiomyopathy with EF of 20%, AICD/BiV pacemaker is currently being considered by cardiology.     Allergies   Allergen Reactions    Penicillins Itching     Outpatient Medications Marked as Taking for the 8/23/21 encounter (Office Visit) with Ricky Lopez MD   Medication Sig Dispense Refill    VITAMIN D PO Take by mouth      carvedilol (COREG) 3.125 MG tablet 1 tab in the am and 2 tabs in the pm 90 tablet 1    torsemide (DEMADEX) 100 MG tablet 1/2 tab daily, except take 1 tab on Thursday 30 tablet 3    spironolactone (ALDACTONE) 25 MG tablet Take 0.5 tablets by mouth daily 30 tablet 5    metOLazone (ZAROXOLYN) 5 MG tablet Take 1 tablet by mouth daily as needed (swelling) 30 tablet 0    Nebulizers MISC by Does not apply route      OXYGEN Inhale into the lungs 2.5 L at HS      sacubitril-valsartan (ENTRESTO) 24-26 MG per tablet Take 1 tablet by mouth 2 times daily (Patient taking differently: Take 1 tablet by mouth 2 times daily Taking 1/2 tablet bid) 60 tablet 2    ipratropium-albuterol (DUONEB) 0.5-2.5 (3) MG/3ML SOLN nebulizer solution Inhale 3 mLs into the lungs every 4 hours 360 mL 11    albuterol sulfate HFA (VENTOLIN HFA) 108 (90 Base) MCG/ACT inhaler Inhale 2 puffs into the lungs 4 times daily as needed for Wheezing 1 Inhaler 5    atorvastatin (LIPITOR) 40 MG tablet TAKE ONE TABLET BY MOUTH DAILY 90 tablet 0    insulin glargine (BASAGLAR KWIKPEN) 100 UNIT/ML injection pen Inject 50 Units into the skin nightly       dapagliflozin (FARXIGA) 5 MG tablet Take 5 mg by mouth every morning  amitriptyline (ELAVIL) 25 MG tablet Take 25 mg by mouth nightly      clopidogrel (PLAVIX) 75 MG tablet Take 1 tablet by mouth daily 90 tablet 3    tolterodine (DETROL) 2 MG tablet Take 2 mg by mouth 2 times daily      Dulaglutide (TRULICITY) 6.22 CORBETT/4.7PW SOPN Inject into the skin      famotidine (PEPCID) 20 MG tablet Take 20 mg by mouth 2 times daily      nitroGLYCERIN (NITROSTAT) 0.4 MG SL tablet Place 1 tablet under the tongue every 5 minutes as needed for Chest pain (No more than 3 tablets in a 15 minute period. ). 25 tablet 2    glucose blood VI test strips (ASCENSIA AUTODISC VI;ONE TOUCH ULTRA TEST VI) strip 1 each by Does not apply route daily. As needed.  metformin (GLUCOPHAGE) 1000 MG tablet Take 1 tablet by mouth 2 times daily (with meals). (Patient taking differently: Take 500 mg by mouth 2 times daily (with meals) ) 90 tablet 3    aspirin 81 MG EC tablet Take 81 mg by mouth daily.  ferrous sulfate 325 (65 FE) MG tablet Take 325 mg by mouth 2 times daily.  gabapentin (NEURONTIN) 600 MG tablet Take 600 mg by mouth 2 times daily. Charissa Julien therapeutic multivitamin-minerals (THERAGRAN-M) tablet Take 2 tablets by mouth daily.          Immunization History   Administered Date(s) Administered    COVID-19, Pfizer, PF, 30mcg/0.3mL 02/25/2021, 03/18/2021    Influenza Virus Vaccine 10/26/2011    Pneumococcal Polysaccharide ([id number]) 10/26/2011       Past Medical History:   Diagnosis Date    [facility]     CAD (coronary artery disease)     CHF (congestive heart failure), NYHA class III ([facility]     Diabetes mellitus ([facility]     HTN (hypertension) 10/26/2011    Hyperlipidemia 10/26/2011     Past Surgical History:   Procedure Laterality Date    CARDIAC SURGERY      stent  x1   2008    CAROTID ENDARTERECTOMY Left 11/02/2016    LEFT CAROTID ENDARTERECTOMY WITH PATCH ANGIOPLASTY    CORONARY ANGIOPLASTY WITH STENT PLACEMENT  05/31/2018    EYE SURGERY Bilateral     cataracts w/lens Family History   Problem Relation Age of Onset    Heart Attack Brother     Heart Disease Brother     Heart Attack Brother        Review of Systems:  Review of Systems   Constitutional: Positive for fatigue and unexpected weight change. Negative for activity change, appetite change and fever. HENT: Negative for congestion, ear discharge, ear pain, postnasal drip, rhinorrhea, sinus pressure, sneezing, sore throat, tinnitus and voice change. Respiratory: Positive for shortness of breath. Negative for apnea, cough, choking, chest tightness, wheezing and stridor. Cardiovascular: Positive for leg swelling. Negative for chest pain and palpitations. Gastrointestinal: Negative for abdominal distention, abdominal pain, anal bleeding, blood in stool, constipation and diarrhea. Musculoskeletal: Negative for arthralgias, back pain and gait problem. Skin: Negative for pallor and rash. Allergic/Immunologic: Negative for environmental allergies. Neurological: Negative for dizziness, tremors, seizures, syncope, speech difficulty, weakness, light-headedness, numbness and headaches. Hematological: Negative for adenopathy. Does not bruise/bleed easily. Psychiatric/Behavioral: Negative for sleep disturbance. Vitals:    08/23/21 1632 08/23/21 1634   BP: (!) 98/58 (!) 98/58   Pulse: 74    SpO2: 95%    Weight: 162 lb (73.5 kg)    Height: 5' 5\" (1.651 m)      No flowsheet data found. Body mass index is 26.96 kg/m². Wt Readings from Last 3 Encounters:   08/23/21 162 lb (73.5 kg)   08/19/21 162 lb 6.4 oz (73.7 kg)   08/04/21 161 lb 8 oz (73.3 kg)     BP Readings from Last 3 Encounters:   08/23/21 (!) 98/58   08/19/21 121/78   08/04/21 (!) 90/50         Physical Exam  Constitutional:       General: He is not in acute distress. Appearance: He is well-developed. He is not diaphoretic. HENT:      Mouth/Throat:      Pharynx: No oropharyngeal exudate.    Cardiovascular:      Rate and Rhythm: Normal rate and regular rhythm. Heart sounds: Normal heart sounds. No murmur heard. Pulmonary:      Effort: No respiratory distress. Breath sounds: Rales present. No wheezing. Chest:      Chest wall: No tenderness. Abdominal:      General: There is no distension. Palpations: There is no mass. Tenderness: There is no abdominal tenderness. There is no guarding or rebound. Musculoskeletal:         General: Swelling present. No tenderness or deformity. Skin:     Coloration: Skin is not pale. Findings: No erythema or rash. Neurological:      Mental Status: He is alert and oriented to person, place, and time. Cranial Nerves: No cranial nerve deficit. Motor: No abnormal muscle tone. Coordination: Coordination normal.      Deep Tendon Reflexes: Reflexes normal.             Health Maintenance   Topic Date Due    DTaP/Tdap/Td vaccine (1 - Tdap) Never done    Shingles Vaccine (1 of 2) Never done    Lipid screen  08/18/2018    Annual Wellness Visit (AWV)  Never done    Flu vaccine (1) 09/01/2021    Potassium monitoring  08/02/2022    Creatinine monitoring  08/02/2022    Pneumococcal 65+ years Vaccine  Completed    COVID-19 Vaccine  Completed    Hepatitis A vaccine  Aged Out    Hib vaccine  Aged Out    Meningococcal (ACWY) vaccine  Aged Out          Assessment/Plan:  Severe ischemic cardiomyopathy s/p LAD stent in April 2021, EF 20%. Repeat 2D echocardiogram has been performed today-results not available. Plans for BiV pacemaker/AICD per cardiology. Has 2 L O2 which he uses occasionally with activity and at nighttime. On torsemide 50 mg daily, except Thursday 100 mg. IPF with typical findings noted on prior CT scan from April. Rheumatologic/autoimmune work-up negative. PFT suggestive of significant restriction with FVC of 1.6 L [52% predicted], TLC of 54% predicted and DLCO of 46% predicted from April 2021. This could represent findings related to either IPF or CHF.

## 2021-08-24 ENCOUNTER — OFFICE VISIT (OUTPATIENT)
Dept: CARDIOLOGY CLINIC | Age: 81
End: 2021-08-24
Payer: COMMERCIAL

## 2021-08-24 VITALS
SYSTOLIC BLOOD PRESSURE: 90 MMHG | DIASTOLIC BLOOD PRESSURE: 50 MMHG | OXYGEN SATURATION: 90 % | BODY MASS INDEX: 27.16 KG/M2 | WEIGHT: 163 LBS | HEART RATE: 75 BPM | HEIGHT: 65 IN

## 2021-08-24 DIAGNOSIS — E78.2 MIXED HYPERLIPIDEMIA: ICD-10-CM

## 2021-08-24 DIAGNOSIS — D50.9 IRON DEFICIENCY ANEMIA, UNSPECIFIED IRON DEFICIENCY ANEMIA TYPE: ICD-10-CM

## 2021-08-24 DIAGNOSIS — I25.5 ISCHEMIC CARDIOMYOPATHY: ICD-10-CM

## 2021-08-24 DIAGNOSIS — I44.7 LBBB (LEFT BUNDLE BRANCH BLOCK): ICD-10-CM

## 2021-08-24 DIAGNOSIS — I10 ESSENTIAL HYPERTENSION: ICD-10-CM

## 2021-08-24 DIAGNOSIS — R06.02 SOB (SHORTNESS OF BREATH): ICD-10-CM

## 2021-08-24 DIAGNOSIS — I50.22 CHRONIC SYSTOLIC HEART FAILURE (HCC): Primary | ICD-10-CM

## 2021-08-24 PROCEDURE — 99204 OFFICE O/P NEW MOD 45 MIN: CPT | Performed by: INTERNAL MEDICINE

## 2021-08-24 RX ORDER — DIGOXIN 125 MCG
125 TABLET ORAL DAILY
Qty: 30 TABLET | Refills: 3 | Status: SHIPPED | OUTPATIENT
Start: 2021-08-24 | End: 2021-08-26 | Stop reason: DRUGHIGH

## 2021-08-24 NOTE — PATIENT INSTRUCTIONS
Plan:  1. Start digoxin 125 mcg on Mon, Wed, Fri, Sun  2. Lasix IVP at the infusion center. Thurs at [facility] in ambulatory center  3. Give metolazone the day after going to the infusion center  4. Fluid limit to 2 gallons then decrease to 1.5 gallons after two days  5. Standing labs monthly: BMP and BNP  6.  Follow up in [facility] [place] in 4 weeks

## 2021-08-24 NOTE — PROGRESS NOTES
[facility]  Advanced CHF/Pulmonary Hypertension   Cardiac Evaluation      Bebe Serna  YOB: 1940    Date of Visit:  8/24/21      Chief Complaint   Patient presents with    New Patient    Congestive Heart Failure        History of Present Illness:  Benson Pruitt is a 80 y.o. male who presents from referral from Pawan Kiser CNP and  for consultation and management of cardiomyopathy. He has a history of cardiomyopathy, CAD status post PTCA of circumflex/OM1 with JOANNA, pulmonary fibrosis, HTN, HLD. His Echocardiogram 2018 EF 45-50%. Chest CT showed pulmonary fibrosis March 2021, seen by pulmonary, pulmonary function test showed restrictive lung disease. Echo April 2021 with a EF 20%, stress test 4/13/2021 was abnormal.  Status post right and left heart cath 4/20/2021, successful PCI to LAD with JOANNA. Carvedilol increased 6/2/21. Metolazone started of May 2021-weight decreased from 160 pounds down to 151-152lbs. Coreg adjusted on 08/04/21. Echo from 08/23/21 showed EF was 20% grade III DD. Moderate MR and TR. Moderate pulm HTN with SPAP of 57 mmHg. Today his son states he has increased thirst and drinks more fluids (his son estimates 2.5 gallons). He does not like to use ice to quench his thirst. His son states he is retaining fluid in the abdomen. He is having increased SOB. He has only had metolazone once. He has fatigue especially low energy with walking. He denies CP, palpitations, dizziness or syncope. He has been on farxiga for some time. He is taking Entresto 1/2 tablet twice daily. His dry weight is supposed to be 157. He is not sleeping well. He is propped on pillows at night.        Allergies   Allergen Reactions    Penicillins Itching     Current Outpatient Medications   Medication Sig Dispense Refill    digoxin (LANOXIN) 125 MCG tablet Take 1 tablet by mouth daily 1 tablet M,W,F, Sun 30 tablet 3    VITAMIN D PO Take by mouth      carvedilol (COREG) 3.125 MG tablet 1 tab in the am and 2 tabs in the pm 90 tablet 1    torsemide (DEMADEX) 100 MG tablet 1/2 tab daily, except take 1 tab on Thursday 30 tablet 3    spironolactone (ALDACTONE) 25 MG tablet Take 0.5 tablets by mouth daily 30 tablet 5    metOLazone (ZAROXOLYN) 5 MG tablet Take 1 tablet by mouth daily as needed (swelling) 30 tablet 0    OXYGEN Inhale into the lungs 2.5 L at HS      sacubitril-valsartan (ENTRESTO) 24-26 MG per tablet Take 1 tablet by mouth 2 times daily (Patient taking differently: Take 1 tablet by mouth 2 times daily Taking 1/2 tablet bid) 60 tablet 2    ipratropium-albuterol (DUONEB) 0.5-2.5 (3) MG/3ML SOLN nebulizer solution Inhale 3 mLs into the lungs every 4 hours 360 mL 11    albuterol sulfate HFA (VENTOLIN HFA) 108 (90 Base) MCG/ACT inhaler Inhale 2 puffs into the lungs 4 times daily as needed for Wheezing 1 Inhaler 5    atorvastatin (LIPITOR) 40 MG tablet TAKE ONE TABLET BY MOUTH DAILY 90 tablet 0    insulin glargine (BASAGLAR KWIKPEN) 100 UNIT/ML injection pen Inject 50 Units into the skin nightly       dapagliflozin (FARXIGA) 5 MG tablet Take 5 mg by mouth every morning      amitriptyline (ELAVIL) 25 MG tablet Take 25 mg by mouth nightly      clopidogrel (PLAVIX) 75 MG tablet Take 1 tablet by mouth daily 90 tablet 3    tolterodine (DETROL) 2 MG tablet Take 2 mg by mouth 2 times daily      Dulaglutide (TRULICITY) 2.15 TQ/1.0QA SOPN Inject into the skin      famotidine (PEPCID) 20 MG tablet Take 20 mg by mouth 2 times daily      nitroGLYCERIN (NITROSTAT) 0.4 MG SL tablet Place 1 tablet under the tongue every 5 minutes as needed for Chest pain (No more than 3 tablets in a 15 minute period. ). 25 tablet 2    metformin (GLUCOPHAGE) 1000 MG tablet Take 1 tablet by mouth 2 times daily (with meals). (Patient taking differently: Take 500 mg by mouth 2 times daily (with meals) ) 90 tablet 3    aspirin 81 MG EC tablet Take 81 mg by mouth daily.         ferrous sulfate 325 (65 FE) MG tablet Take 325 mg by mouth 2 times daily.  gabapentin (NEURONTIN) 600 MG tablet Take 600 mg by mouth 2 times daily. [place] therapeutic multivitamin-minerals (THERAGRAN-M) tablet Take 2 tablets by mouth daily.  Nebulizers MISC by Does not apply route      glucose blood VI test strips (ASCENSIA AUTODISC VI;ONE TOUCH ULTRA TEST VI) strip 1 each by Does not apply route daily. As needed. No current facility-administered medications for this visit.        Past Medical History:   Diagnosis Date    Acute MI ([facility]     CAD (coronary artery disease)     CHF (congestive heart failure), NYHA class III ([facility] [facility]     [facility]     HTN (hypertension) 10/26/2011    Hyperlipidemia 10/26/2011     Past Surgical History:   Procedure Laterality Date    CARDIAC SURGERY      stent  x1   2008    CAROTID ENDARTERECTOMY Left 11/02/2016    LEFT CAROTID ENDARTERECTOMY WITH PATCH ANGIOPLASTY    CORONARY ANGIOPLASTY WITH STENT PLACEMENT  05/31/2018    EYE SURGERY Bilateral     cataracts w/lens     Family History   Problem Relation Age of Onset    Heart Attack Brother     Heart Disease Brother     Heart Attack Brother      Social History     Socioeconomic History    Marital status:      Spouse name: Not on file    Number of children: Not on file    Years of education: Not on file    Highest education level: Not on file   Occupational History    Not on file   Tobacco Use    Smoking status: Never Smoker    Smokeless tobacco: Never Used   Vaping Use    Vaping Use: Never used   Substance and Sexual Activity    Alcohol use: No    Drug use: No    Sexual activity: Yes     Partners: Female   Other Topics Concern    Not on file   Social History Narrative    Not on file     Social Determinants of Health     Financial Resource Strain:     Difficulty of Paying Living Expenses:    Food Insecurity:     Worried About Running Out of Food in the Last Year:     [place] of Food in the Last Year: Transportation Needs:     Lack of Transportation (Medical):  Lack of Transportation (Non-Medical):    Physical Activity:     Days of Exercise per Week:     Minutes of Exercise per Session:    Stress:     Feeling of Stress :    Social Connections:     Frequency of Communication with Friends and Family:     Frequency of Social Gatherings with Friends and Family:     Attends Sabianist Services:     Active Member of Clubs or Organizations:     Attends Club or Organization Meetings:     Marital Status:    Intimate Partner Violence:     Fear of Current or Ex-Partner:     Emotionally Abused:     Physically Abused:     Sexually Abused:        Review of Systems:   · Constitutional: there has been no unanticipated weight loss. There's been no change in energy level, sleep pattern, or activity level. · Eyes: No visual changes or diplopia. No scleral icterus. · ENT: No Headaches, hearing loss or vertigo. No mouth sores or sore throat. · Cardiovascular: Reviewed in HPI  · Respiratory: No cough or wheezing, no sputum production. No hematemesis. · Gastrointestinal: No abdominal pain, appetite loss, blood in stools. No change in bowel or bladder habits. · Genitourinary: No dysuria, trouble voiding, or hematuria. · Musculoskeletal:  No gait disturbance, weakness or joint complaints. · Integumentary: No rash or pruritis. · Neurological: No headache, diplopia, change in muscle strength, numbness or tingling. No change in gait, balance, coordination, mood, affect, memory, mentation, behavior. · Psychiatric: No anxiety, no depression. · Endocrine: No malaise, fatigue or temperature intolerance. No excessive thirst, fluid intake, or urination. No tremor. · Hematologic/Lymphatic: No abnormal bruising or bleeding, blood clots or swollen lymph nodes. · Allergic/Immunologic: No nasal congestion or hives.     Physical Examination:    Vitals:    08/24/21 1431 08/24/21 1435   BP: (!) 84/46 (!) 90/50   Pulse: 75    SpO2: 90%    Weight: 163 lb (73.9 kg)    Height: 5' 5\" (1.651 m)      Body mass index is 27.12 kg/m². Wt Readings from Last 3 Encounters:   08/24/21 163 lb (73.9 kg)   08/23/21 162 lb (73.5 kg)   08/19/21 162 lb 6.4 oz (73.7 kg)     BP Readings from Last 3 Encounters:   08/24/21 (!) 90/50   08/23/21 (!) 98/58   08/19/21 121/78     Constitutional and General Appearance:   Chronically ill appearing  HEENT:  NC/AT  ERICK  No problems with hearing  Skin:  Warm, dry  Respiratory:  · Normal excursion and expansion without use of accessory muscles  · Resp Auscultation: Normal breath sounds without dullness  Cardiovascular:  · The apical impulses not displaced  · Heart tones are crisp and normal  · Cervical veins are not engorged  · The carotid upstroke is normal in amplitude and contour without delay or bruit  · JVP is elevated to the angle of the jaw  RRR with nl S1 and S2 without m,r,g  · Peripheral pulses are symmetrical and full  · There is no clubbing, cyanosis of the extremities. · Trace bilateral edema  · Femoral Arteries: 2+ and equal  · Pedal Pulses: 2+ and equal   Neck:  · No thyromegaly  Abdomen:  Increased abdominal girth consistent with ascites  · No masses or tenderness  · Liver/Spleen: No Abnormalities Noted  Neurological/Psychiatric:  · Alert and oriented in all spheres  · Moves all extremities well  · Exhibits normal gait balance and coordination  · No abnormalities of mood, affect, memory, mentation, or behavior are noted    Echo: 08/23/21   Left ventricular systolic function is reduced with ejection fraction   estimated at 20 %. Akinesis of the inferior, septal and apical septal walls with severe   hypokinesis of the remaining walls. Left ventricular cavity size is mildly dilated with mild concentric left   ventricular hypertrophy. Grade III diastolic dysfunction with elevated filling pressure. TAPSE is measured at 12.9 mm.   S'' prime velocity is measured at 6.2 cm/s.    Right ventricular systolic function is moderately reduced . Biatrial enlargement. The aortic root is mildly dilated. Moderate mitral and tricuspid regurgitation. Systolic pulmonary artery pressure (SPAP) estimated at 57 mmHg (right atrial   pressure 8 mmHg), consistent with moderate pulmonary hypertension. Cardiac cath: 04/28/21  Intervention  ~Successful PCI to LAD with 3.0x32 JOANNA. PD with 3.5x15 NC to 22atm. Excellent Result. Contrast: 77  Flouro Time: 17.2  Access: R CFA/CFV. Perc stick in sfa (high bifurcation)     Impression  ~Coronary Angiography w/ severe single vessel CAD  ~LVG with LVEF of 20 and LAD regional wall motion abnormalities  ~Successful complex angioplasty and stenting of LAD        Labs were reviewed including labs from other hospital systems through [facility]. Cardiac testing was reviewed including echos, nuclear scans, cardiac catheterization, including from other hospital systems through [facility]. Assessment:    1. Chronic systolic heart failure ([address]    2. Ischemic cardiomyopathy    3. Iron deficiency anemia, unspecified iron deficiency anemia type    4. Essential hypertension    5. Mixed hyperlipidemia    6. SOB (shortness of breath)    7. LBBB (left bundle branch block)         Plan:  1. Start digoxin 125 mcg on Mon, Wed, Fri, Sun  2. Lasix IVP at the infusion center. Thurs at [facility] in ambulatory center  3. Give metolazone the day after going to the infusion center  4. Fluid limit to 2 gallons then decrease to 1.5 gallons after two days  5. Standing labs monthly: BMP and BNP  6. Follow up in [facility] in 4 weeks   7. He has seen Dr. Crista Blake. He needs an ICD given that his EF has not improved. Scribe's attestation: This note was scribed in the presence of Annmarie Chun M.D. By Awilda Chan RN    The scribe's documentation has been prepared under my direction and personally reviewed by me in its entirety.   I confirm that the note above accurately reflects all work, treatment, procedures, and medical decision making performed by me. Time Based Itemization  A total of 60 minutes was spent on today's patient encounter. If applicable, non-patient-facing activities:  ( x)Preparing to see the patient and reviewing records  ( ) Individual interpretation of results  ( ) Discussion or coordination of care with other health care professionals  ( x) Ordering of unique tests, medications, or procedures  ( x) Documentation within the EHR       I appreciate the opportunity of cooperating in the care of this patient.     Lisy Fishman M.D., [facility] [place]

## 2021-08-24 NOTE — LETTER
[facility]  Advanced CHF/Pulmonary Hypertension   Cardiac Evaluation      Keenan Serna  YOB: 1940    Date of Visit:  8/24/21      Chief Complaint   Patient presents with    New Patient    Congestive Heart Failure        History of Present Illness:  Winifred Santos is a 80 y.o. male who presents from referral from Lissette Lancaster CNP and  for consultation and management of cardiomyopathy. He has a history of cardiomyopathy, CAD status post PTCA of circumflex/OM1 with JOANNA, pulmonary fibrosis, HTN, HLD. His Echocardiogram 2018 EF 45-50%. Chest CT showed pulmonary fibrosis March 2021, seen by pulmonary, pulmonary function test showed restrictive lung disease. Echo April 2021 with a EF 20%, stress test 4/13/2021 was abnormal.  Status post right and left heart cath 4/20/2021, successful PCI to LAD with JOANNA. Carvedilol increased 6/2/21. Metolazone started of May 2021-weight decreased from 160 pounds down to 151-152lbs. Coreg adjusted on 08/04/21. Echo from 08/23/21 showed EF was 20% grade III DD. Moderate MR and TR. Moderate pulm HTN with SPAP of 57 mmHg. Today his son states he has increased thirst and drinks more fluids (his son estimates 2.5 gallons). He does not like to use ice to quench his thirst. His son states he is retaining fluid in the abdomen. He is having increased SOB. He has only had metolazone once. He has fatigue especially low energy with walking. He denies CP, palpitations, dizziness or syncope. He has been on farxiga for some time. He is taking Entresto 1/2 tablet twice daily. His dry weight is supposed to be 157. He is not sleeping well. He is propped on pillows at night.        Allergies   Allergen Reactions    Penicillins Itching     Current Outpatient Medications   Medication Sig Dispense Refill    digoxin (LANOXIN) 125 MCG tablet Take 1 tablet by mouth daily 1 tablet M,W,F, Sun 30 tablet 3    VITAMIN D PO Take by mouth      carvedilol (COREG) 3.125 MG tablet 1 tab in the am and 2 tabs in the pm 90 tablet 1    torsemide (DEMADEX) 100 MG tablet 1/2 tab daily, except take 1 tab on Thursday 30 tablet 3    spironolactone (ALDACTONE) 25 MG tablet Take 0.5 tablets by mouth daily 30 tablet 5    metOLazone (ZAROXOLYN) 5 MG tablet Take 1 tablet by mouth daily as needed (swelling) 30 tablet 0    OXYGEN Inhale into the lungs 2.5 L at HS      sacubitril-valsartan (ENTRESTO) 24-26 MG per tablet Take 1 tablet by mouth 2 times daily (Patient taking differently: Take 1 tablet by mouth 2 times daily Taking 1/2 tablet bid) 60 tablet 2    ipratropium-albuterol (DUONEB) 0.5-2.5 (3) MG/3ML SOLN nebulizer solution Inhale 3 mLs into the lungs every 4 hours 360 mL 11    albuterol sulfate HFA (VENTOLIN HFA) 108 (90 Base) MCG/ACT inhaler Inhale 2 puffs into the lungs 4 times daily as needed for Wheezing 1 Inhaler 5    atorvastatin (LIPITOR) 40 MG tablet TAKE ONE TABLET BY MOUTH DAILY 90 tablet 0    insulin glargine (BASAGLAR KWIKPEN) 100 UNIT/ML injection pen Inject 50 Units into the skin nightly       dapagliflozin (FARXIGA) 5 MG tablet Take 5 mg by mouth every morning      amitriptyline (ELAVIL) 25 MG tablet Take 25 mg by mouth nightly      clopidogrel (PLAVIX) 75 MG tablet Take 1 tablet by mouth daily 90 tablet 3    tolterodine (DETROL) 2 MG tablet Take 2 mg by mouth 2 times daily      Dulaglutide (TRULICITY) 6.18 MF/3.0MK SOPN Inject into the skin      famotidine (PEPCID) 20 MG tablet Take 20 mg by mouth 2 times daily      nitroGLYCERIN (NITROSTAT) 0.4 MG SL tablet Place 1 tablet under the tongue every 5 minutes as needed for Chest pain (No more than 3 tablets in a 15 minute period. ). 25 tablet 2    metformin (GLUCOPHAGE) 1000 MG tablet Take 1 tablet by mouth 2 times daily (with meals). (Patient taking differently: Take 500 mg by mouth 2 times daily (with meals) ) 90 tablet 3    aspirin 81 MG EC tablet Take 81 mg by mouth daily.         ferrous sulfate 325 (65 FE) MG tablet Take 325 mg by mouth 2 times daily.  gabapentin (NEURONTIN) 600 MG tablet Take 600 mg by mouth 2 times daily. Yossi Joelle therapeutic multivitamin-minerals (THERAGRAN-M) tablet Take 2 tablets by mouth daily.  Nebulizers MISC by Does not apply route      glucose blood VI test strips (ASCENSIA AUTODISC VI;ONE TOUCH ULTRA TEST VI) strip 1 each by Does not apply route daily. As needed. No current facility-administered medications for this visit.        Past Medical History:   Diagnosis Date    Acute MI ([facility]     CAD (coronary artery disease)     CHF (congestive heart failure), NYHA class III ([facility]     Diabetes mellitus ([facility]     HTN (hypertension) 10/26/2011    Hyperlipidemia 10/26/2011     Past Surgical History:   Procedure Laterality Date    CARDIAC SURGERY      stent  x1   2008    CAROTID ENDARTERECTOMY Left 11/02/2016    LEFT CAROTID ENDARTERECTOMY WITH PATCH ANGIOPLASTY    CORONARY ANGIOPLASTY WITH STENT PLACEMENT  05/31/2018    EYE SURGERY Bilateral     cataracts w/lens     Family History   Problem Relation Age of Onset    Heart Attack Brother     Heart Disease Brother     Heart Attack Brother      Social History     Socioeconomic History    Marital status:      Spouse name: Not on file    Number of children: Not on file    Years of education: Not on file    Highest education level: Not on file   Occupational History    Not on file   Tobacco Use    Smoking status: Never Smoker    Smokeless tobacco: Never Used   Vaping Use    Vaping Use: Never used   Substance and Sexual Activity    Alcohol use: No    Drug use: No    Sexual activity: Yes     Partners: Female   Other Topics Concern    Not on file   Social History Narrative    Not on file     Social Determinants of Health     Financial Resource Strain:     Difficulty of Paying Living Expenses:    Food Insecurity:     Worried About Running Out of Food in the Last Year:     Ran Out of Food in the Last Year:    Transportation Needs:     Lack of Transportation (Medical):  Lack of Transportation (Non-Medical):    Physical Activity:     Days of Exercise per Week:     Minutes of Exercise per Session:    Stress:     Feeling of Stress :    Social Connections:     Frequency of Communication with Friends and Family:     Frequency of Social Gatherings with Friends and Family:     Attends Spiritism Services:     Active Member of Clubs or Organizations:     Attends Club or Organization Meetings:     Marital Status:    Intimate Partner Violence:     Fear of Current or Ex-Partner:     Emotionally Abused:     Physically Abused:     Sexually Abused:        Review of Systems:   · Constitutional: there has been no unanticipated weight loss. There's been no change in energy level, sleep pattern, or activity level. · Eyes: No visual changes or diplopia. No scleral icterus. · ENT: No Headaches, hearing loss or vertigo. No mouth sores or sore throat. · Cardiovascular: Reviewed in HPI  · Respiratory: No cough or wheezing, no sputum production. No hematemesis. · Gastrointestinal: No abdominal pain, appetite loss, blood in stools. No change in bowel or bladder habits. · Genitourinary: No dysuria, trouble voiding, or hematuria. · Musculoskeletal:  No gait disturbance, weakness or joint complaints. · Integumentary: No rash or pruritis. · Neurological: No headache, diplopia, change in muscle strength, numbness or tingling. No change in gait, balance, coordination, mood, affect, memory, mentation, behavior. · Psychiatric: No anxiety, no depression. · Endocrine: No malaise, fatigue or temperature intolerance. No excessive thirst, fluid intake, or urination. No tremor. · Hematologic/Lymphatic: No abnormal bruising or bleeding, blood clots or swollen lymph nodes. · Allergic/Immunologic: No nasal congestion or hives.     Physical Examination:    Vitals:    08/24/21 1431 08/24/21 1435   BP: (!) 84/46 (!) 90/50 Pulse: 75    SpO2: 90%    Weight: 163 lb (73.9 kg)    Height: 5' 5\" (1.651 m)      Body mass index is 27.12 kg/m². Wt Readings from Last 3 Encounters:   08/24/21 163 lb (73.9 kg)   08/23/21 162 lb (73.5 kg)   08/19/21 162 lb 6.4 oz (73.7 kg)     BP Readings from Last 3 Encounters:   08/24/21 (!) 90/50   08/23/21 (!) 98/58   08/19/21 121/78     Constitutional and General Appearance:   Chronically ill appearing  HEENT:  NC/AT  ERICK  No problems with hearing  Skin:  Warm, dry  Respiratory:  · Normal excursion and expansion without use of accessory muscles  · Resp Auscultation: Normal breath sounds without dullness  Cardiovascular:  · The apical impulses not displaced  · Heart tones are crisp and normal  · Cervical veins are not engorged  · The carotid upstroke is normal in amplitude and contour without delay or bruit  · JVP is elevated to the angle of the jaw  RRR with nl S1 and S2 without m,r,g  · Peripheral pulses are symmetrical and full  · There is no clubbing, cyanosis of the extremities. · Trace bilateral edema  · Femoral Arteries: 2+ and equal  · Pedal Pulses: 2+ and equal   Neck:  · No thyromegaly  Abdomen:  Increased abdominal girth consistent with ascites  · No masses or tenderness  · Liver/Spleen: No Abnormalities Noted  Neurological/Psychiatric:  · Alert and oriented in all spheres  · Moves all extremities well  · Exhibits normal gait balance and coordination  · No abnormalities of mood, affect, memory, mentation, or behavior are noted    Echo: 08/23/21   Left ventricular systolic function is reduced with ejection fraction   estimated at 20 %. Akinesis of the inferior, septal and apical septal walls with severe   hypokinesis of the remaining walls. Left ventricular cavity size is mildly dilated with mild concentric left   ventricular hypertrophy. Grade III diastolic dysfunction with elevated filling pressure. TAPSE is measured at 12.9 mm.   S'' prime velocity is measured at 6.2 cm/s.    Right ventricular systolic function is moderately reduced . Biatrial enlargement. The aortic root is mildly dilated. Moderate mitral and tricuspid regurgitation. Systolic pulmonary artery pressure (SPAP) estimated at 57 mmHg (right atrial   pressure 8 mmHg), consistent with moderate pulmonary hypertension. Cardiac cath: 04/28/21  Intervention  ~Successful PCI to LAD with 3.0x32 JOANNA. PD with 3.5x15 NC to 22atm. Excellent Result. Contrast: 77  Flouro Time: 17.2  Access: R CFA/CFV. Perc stick in sfa (high bifurcation)     Impression  ~Coronary Angiography w/ severe single vessel CAD  ~LVG with LVEF of 20 and LAD regional wall motion abnormalities  ~Successful complex angioplasty and stenting of LAD        Labs were reviewed including labs from other hospital systems through [facility]. Cardiac testing was reviewed including echos, nuclear scans, cardiac catheterization, including from other hospital systems through [facility]. Assessment:    1. Chronic systolic heart failure ([address]    2. Ischemic cardiomyopathy    3. Iron deficiency anemia, unspecified iron deficiency anemia type    4. Essential hypertension    5. Mixed hyperlipidemia    6. SOB (shortness of breath)    7. LBBB (left bundle branch block)         Plan:  1. Start digoxin 125 mcg on Mon, Wed, Fri, Sun  2. Lasix IVP at the infusion center. Thurs at [facility] in ambulatory center  3. Give metolazone the day after going to the infusion center  4. Fluid limit to 2 gallons then decrease to 1.5 gallons after two days  5. Standing labs monthly: BMP and BNP  6. Follow up in [facility] in 4 weeks   7. He has seen Dr. Kt Briggs. He needs an ICD given that his EF has not improved. Scribe's attestation: This note was scribed in the presence of Luis Doran M.D. By David Ahmadi RN    The scribe's documentation has been prepared under my direction and personally reviewed by me in its entirety.   I confirm that the note above accurately reflects all work, treatment, procedures, and medical decision making performed by me. Time Based Itemization  A total of 60 minutes was spent on today's patient encounter. If applicable, non-patient-facing activities:  ( x)Preparing to see the patient and reviewing records  ( ) Individual interpretation of results  ( ) Discussion or coordination of care with other health care professionals  ( x) Ordering of unique tests, medications, or procedures  ( x) Documentation within the EHR       I appreciate the opportunity of cooperating in the care of this patient.     Barney Rocha M.D., [facility] - [place], [place]

## 2021-08-26 ENCOUNTER — TELEPHONE (OUTPATIENT)
Dept: CARDIOLOGY CLINIC | Age: 81
End: 2021-08-26

## 2021-08-26 ENCOUNTER — HOSPITAL ENCOUNTER (OUTPATIENT)
Dept: ONCOLOGY | Age: 81
Setting detail: INFUSION SERIES
Discharge: HOME | End: 2021-08-26
Payer: COMMERCIAL

## 2021-08-26 VITALS
DIASTOLIC BLOOD PRESSURE: 69 MMHG | TEMPERATURE: 97.2 F | SYSTOLIC BLOOD PRESSURE: 95 MMHG | HEART RATE: 90 BPM | RESPIRATION RATE: 16 BRPM

## 2021-08-26 DIAGNOSIS — I50.22 CHRONIC SYSTOLIC HEART FAILURE (HCC): ICD-10-CM

## 2021-08-26 LAB
ANION GAP SERPL CALCULATED.3IONS-SCNC: 9 MMOL/L (ref 3–16)
BUN BLDV-MCNC: 36 MG/DL (ref 7–20)
CALCIUM SERPL-MCNC: 9.5 MG/DL (ref 8.3–10.6)
CHLORIDE BLD-SCNC: 99 MMOL/L (ref 99–110)
CO2: 31 MMOL/L (ref 21–32)
CREAT SERPL-MCNC: 1.2 MG/DL (ref 0.8–1.3)
FERRITIN: 203.2 NG/ML (ref 30–400)
GFR AFRICAN AMERICAN: >60
GFR NON-AFRICAN AMERICAN: 58
GLUCOSE BLD-MCNC: 106 MG/DL (ref 70–99)
HCT VFR BLD CALC: 35.9 % (ref 40.5–52.5)
HEMOGLOBIN: 11.9 G/DL (ref 13.5–17.5)
IRON SATURATION: 18 % (ref 20–50)
IRON: 49 UG/DL (ref 59–158)
MCH RBC QN AUTO: 28.9 PG (ref 26–34)
MCHC RBC AUTO-ENTMCNC: 33.2 G/DL (ref 31–36)
MCV RBC AUTO: 87.1 FL (ref 80–100)
PDW BLD-RTO: 17.2 % (ref 12.4–15.4)
PLATELET # BLD: 272 K/UL (ref 135–450)
PMV BLD AUTO: 8.4 FL (ref 5–10.5)
POTASSIUM SERPL-SCNC: 4.6 MMOL/L (ref 3.5–5.1)
PRO-BNP: 3575 PG/ML (ref 0–449)
RBC # BLD: 4.12 M/UL (ref 4.2–5.9)
SODIUM BLD-SCNC: 139 MMOL/L (ref 136–145)
TOTAL IRON BINDING CAPACITY: 270 UG/DL (ref 260–445)
WBC # BLD: 10.1 K/UL (ref 4–11)

## 2021-08-26 PROCEDURE — 80048 BASIC METABOLIC PNL TOTAL CA: CPT

## 2021-08-26 PROCEDURE — 6360000002 HC RX W HCPCS: Performed by: INTERNAL MEDICINE

## 2021-08-26 PROCEDURE — 96374 THER/PROPH/DIAG INJ IV PUSH: CPT

## 2021-08-26 PROCEDURE — 2580000003 HC RX 258: Performed by: INTERNAL MEDICINE

## 2021-08-26 PROCEDURE — 83880 ASSAY OF NATRIURETIC PEPTIDE: CPT

## 2021-08-26 PROCEDURE — 99211 OFF/OP EST MAY X REQ PHY/QHP: CPT

## 2021-08-26 PROCEDURE — 82728 ASSAY OF FERRITIN: CPT

## 2021-08-26 PROCEDURE — 83550 IRON BINDING TEST: CPT

## 2021-08-26 PROCEDURE — 83540 ASSAY OF IRON: CPT

## 2021-08-26 PROCEDURE — 85027 COMPLETE CBC AUTOMATED: CPT

## 2021-08-26 RX ORDER — SODIUM CHLORIDE 0.9 % (FLUSH) 0.9 %
5-40 SYRINGE (ML) INJECTION PRN
Status: DISCONTINUED | OUTPATIENT
Start: 2021-08-26 | End: 2021-08-27 | Stop reason: HOSPADM

## 2021-08-26 RX ORDER — FUROSEMIDE 10 MG/ML
120 INJECTION INTRAMUSCULAR; INTRAVENOUS ONCE
Status: COMPLETED | OUTPATIENT
Start: 2021-08-26 | End: 2021-08-26

## 2021-08-26 RX ORDER — DIGOXIN 125 MCG
125 TABLET ORAL SEE ADMIN INSTRUCTIONS
Qty: 30 TABLET | Refills: 3 | Status: SHIPPED | OUTPATIENT
Start: 2021-08-26 | End: 2022-02-11 | Stop reason: DRUGHIGH

## 2021-08-26 RX ADMIN — Medication 10 ML: at 09:26

## 2021-08-26 RX ADMIN — FUROSEMIDE 120 MG: 10 INJECTION INTRAMUSCULAR; INTRAVENOUS at 09:26

## 2021-08-26 NOTE — TELEPHONE ENCOUNTER
Pharmacy faxed over questioning the sig on rx for Digoxin. It states take 1 tab daily and take M,W,F,Sun. I adjusted the script accordingly.  (m,w,f,sun)

## 2021-08-27 ENCOUNTER — TELEPHONE (OUTPATIENT)
Dept: CARDIOLOGY CLINIC | Age: 81
End: 2021-08-27

## 2021-08-27 DIAGNOSIS — D50.9 IRON DEFICIENCY ANEMIA, UNSPECIFIED IRON DEFICIENCY ANEMIA TYPE: ICD-10-CM

## 2021-08-27 RX ORDER — SODIUM CHLORIDE 0.9 % (FLUSH) 0.9 %
5-40 SYRINGE (ML) INJECTION PRN
Status: CANCELLED | OUTPATIENT
Start: 2021-08-27

## 2021-08-27 RX ORDER — SODIUM CHLORIDE 9 MG/ML
INJECTION, SOLUTION INTRAVENOUS CONTINUOUS
Status: CANCELLED | OUTPATIENT
Start: 2021-08-27

## 2021-08-27 NOTE — TELEPHONE ENCOUNTER
Spoke with pt's son, Yesenia Barnes.     Relayed Results and instructions as directed    Order faxed to [facility]

## 2021-08-27 NOTE — TELEPHONE ENCOUNTER
Son is calling Marlyn back regarding pt's IV infusions.  Please call [phone] to speak to son, [place]

## 2021-08-27 NOTE — TELEPHONE ENCOUNTER
----- Message from Hammad Ruiz MD sent at 8/26/2021  4:37 PM EDT -----  Call patient's son on Friday and see how he did with the IV lasix. His labs show that he is more anemic and iron deficient. I want to schedule him to get IV iron through the infusion center for a couple of treatments. Venofer 200 mg IV q week x 3. We can give more IV lasix with the iron treatments if that helped. Otherwise, stay on the same meds as we discussed in office. Hammad Ruiz    Mr. Mercy Chavez, your labs show that you are more anemic and iron deficient. I want to arrange for you to get IV iron treatments through the infusion center. We can set this up for weekly treatments for 3 weeks and he can also get IV lasix with the iron if we feel that this is helping. The office will call you to discuss further.   Hammad Ruiz

## 2021-09-03 ENCOUNTER — PATIENT MESSAGE (OUTPATIENT)
Dept: CARDIOLOGY CLINIC | Age: 81
End: 2021-09-03

## 2021-09-03 NOTE — LETTER
[facility]    9/3/21  [facility]  1940  EP Procedures   Pacemaker implant (single/dual)  EP Study    ICD implant (single/dual)  Atrial flutter ablation (SHAGUFTA Y/N)   xxx Biv implant ICD  Tilt Table    Biv implant PPM  Atrial fibrillation ablation (SHAGUFTA Yes)    Generator Change (PPM/ICD/BiV)  SVT ablation    Lead revision (RV/LA/RA) (<1 month)  VT ablation      Lead extraction +/- upgrade (BiV/PPM/ICD)  VT Ischemic/ non-ischemic    Loop implant/ removal  VT RVOT    Cardioversion  VT Left sided    SHAGUFTA  AVN ablation     Equipment  xxx Medtronic   LING Mapping System    [employer]  Carto Mapping System    [employer]  CryoAblation    Biotronik  Laser Lead Extraction     EP Procedures Scheduling Request  # hours Requested   Scheduled  Date:   Specific Day  Completed    Anesthesia  F/u Date:   CT surgery backup  COVID     Overnight stay      Location [facility]       Pre-Procedure Labs / Imaging   PT/INR  Type & cross    CBC  Units PRBC    BMP/Mg  Units FFP    Venogram  Cardiac CTA for Pulmonary vein mapping     RN INITIALS: RA    Patient Instructions  Do not eat or drink after midnight the night prior to procedure  Dx, CM/LBBB

## 2021-09-03 NOTE — TELEPHONE ENCOUNTER
From: Evgeny Ch  To: Natanael Rodgers MD  Sent: 9/3/2021 2:59 PM EDT  Subject: Visit Jayna Lyman,    My Dads Marshal Carmen has been done and is pressure is still 20. Any idea when his ICD appointment will be scheduled.     Nieves Raya  [phone]

## 2021-09-09 ENCOUNTER — HOSPITAL ENCOUNTER (OUTPATIENT)
Dept: ONCOLOGY | Age: 81
Setting detail: INFUSION SERIES
Discharge: HOME | End: 2021-09-09
Payer: COMMERCIAL

## 2021-09-09 VITALS
RESPIRATION RATE: 16 BRPM | HEART RATE: 86 BPM | TEMPERATURE: 97.2 F | DIASTOLIC BLOOD PRESSURE: 64 MMHG | SYSTOLIC BLOOD PRESSURE: 103 MMHG

## 2021-09-09 DIAGNOSIS — D50.9 IRON DEFICIENCY ANEMIA, UNSPECIFIED IRON DEFICIENCY ANEMIA TYPE: ICD-10-CM

## 2021-09-09 DIAGNOSIS — I50.32 CHRONIC DIASTOLIC CONGESTIVE HEART FAILURE (HCC): Primary | ICD-10-CM

## 2021-09-09 LAB
ANION GAP SERPL CALCULATED.3IONS-SCNC: 11 MMOL/L (ref 3–16)
BUN BLDV-MCNC: 41 MG/DL (ref 7–20)
CALCIUM SERPL-MCNC: 9.4 MG/DL (ref 8.3–10.6)
CHLORIDE BLD-SCNC: 95 MMOL/L (ref 99–110)
CO2: 28 MMOL/L (ref 21–32)
CREAT SERPL-MCNC: 1.3 MG/DL (ref 0.8–1.3)
GFR AFRICAN AMERICAN: >60
GFR NON-AFRICAN AMERICAN: 53
GLUCOSE BLD-MCNC: 168 MG/DL (ref 70–99)
POTASSIUM SERPL-SCNC: 3.9 MMOL/L (ref 3.5–5.1)
PRO-BNP: 2108 PG/ML (ref 0–449)
SODIUM BLD-SCNC: 134 MMOL/L (ref 136–145)

## 2021-09-09 PROCEDURE — 6360000002 HC RX W HCPCS: Performed by: INTERNAL MEDICINE

## 2021-09-09 PROCEDURE — 99211 OFF/OP EST MAY X REQ PHY/QHP: CPT

## 2021-09-09 PROCEDURE — 2580000003 HC RX 258: Performed by: INTERNAL MEDICINE

## 2021-09-09 PROCEDURE — 83880 ASSAY OF NATRIURETIC PEPTIDE: CPT

## 2021-09-09 PROCEDURE — 80048 BASIC METABOLIC PNL TOTAL CA: CPT

## 2021-09-09 PROCEDURE — 96365 THER/PROPH/DIAG IV INF INIT: CPT

## 2021-09-09 RX ORDER — SODIUM CHLORIDE 0.9 % (FLUSH) 0.9 %
5-40 SYRINGE (ML) INJECTION PRN
Status: DISCONTINUED | OUTPATIENT
Start: 2021-09-09 | End: 2021-09-10 | Stop reason: HOSPADM

## 2021-09-09 RX ORDER — SODIUM CHLORIDE 0.9 % (FLUSH) 0.9 %
5-40 SYRINGE (ML) INJECTION PRN
Status: CANCELLED | OUTPATIENT
Start: 2021-09-16

## 2021-09-09 RX ORDER — SODIUM CHLORIDE 9 MG/ML
INJECTION, SOLUTION INTRAVENOUS CONTINUOUS
Status: CANCELLED | OUTPATIENT
Start: 2021-09-16

## 2021-09-09 RX ORDER — SODIUM CHLORIDE 9 MG/ML
INJECTION, SOLUTION INTRAVENOUS CONTINUOUS
Status: ACTIVE | OUTPATIENT
Start: 2021-09-09 | End: 2021-09-09

## 2021-09-09 RX ADMIN — SODIUM CHLORIDE: 9 INJECTION, SOLUTION INTRAVENOUS at 08:26

## 2021-09-09 RX ADMIN — Medication 200 MG: at 08:27

## 2021-09-09 RX ADMIN — Medication 10 ML: at 08:27

## 2021-09-16 ENCOUNTER — HOSPITAL ENCOUNTER (OUTPATIENT)
Dept: ONCOLOGY | Age: 81
Setting detail: INFUSION SERIES
Discharge: HOME | End: 2021-09-16
Payer: COMMERCIAL

## 2021-09-16 VITALS — HEART RATE: 82 BPM | DIASTOLIC BLOOD PRESSURE: 59 MMHG | SYSTOLIC BLOOD PRESSURE: 92 MMHG | RESPIRATION RATE: 16 BRPM

## 2021-09-16 DIAGNOSIS — D50.9 IRON DEFICIENCY ANEMIA, UNSPECIFIED IRON DEFICIENCY ANEMIA TYPE: ICD-10-CM

## 2021-09-16 DIAGNOSIS — I50.32 CHRONIC DIASTOLIC CONGESTIVE HEART FAILURE (HCC): Primary | ICD-10-CM

## 2021-09-16 PROCEDURE — 2580000003 HC RX 258: Performed by: INTERNAL MEDICINE

## 2021-09-16 PROCEDURE — 99211 OFF/OP EST MAY X REQ PHY/QHP: CPT

## 2021-09-16 PROCEDURE — 6360000002 HC RX W HCPCS: Performed by: INTERNAL MEDICINE

## 2021-09-16 PROCEDURE — 96365 THER/PROPH/DIAG IV INF INIT: CPT

## 2021-09-16 RX ORDER — SODIUM CHLORIDE 0.9 % (FLUSH) 0.9 %
5-40 SYRINGE (ML) INJECTION PRN
Status: DISCONTINUED | OUTPATIENT
Start: 2021-09-16 | End: 2021-09-17 | Stop reason: HOSPADM

## 2021-09-16 RX ORDER — SODIUM CHLORIDE 9 MG/ML
INJECTION, SOLUTION INTRAVENOUS CONTINUOUS
Status: ACTIVE | OUTPATIENT
Start: 2021-09-16 | End: 2021-09-16

## 2021-09-16 RX ORDER — SODIUM CHLORIDE 0.9 % (FLUSH) 0.9 %
5-40 SYRINGE (ML) INJECTION PRN
Status: CANCELLED | OUTPATIENT
Start: 2021-09-23

## 2021-09-16 RX ORDER — SODIUM CHLORIDE 9 MG/ML
INJECTION, SOLUTION INTRAVENOUS CONTINUOUS
Status: CANCELLED | OUTPATIENT
Start: 2021-09-23

## 2021-09-16 RX ADMIN — Medication 200 MG: at 08:21

## 2021-09-16 RX ADMIN — Medication 10 ML: at 09:21

## 2021-09-16 RX ADMIN — SODIUM CHLORIDE: 9 INJECTION, SOLUTION INTRAVENOUS at 08:20

## 2021-09-16 NOTE — PROGRESS NOTES
Patient to department in wheelchair with son at side for second of three doses of venofer. Tolerated venofer infusion well with no adverse rx noted. Given avs with information about venofer. Verbally told about most common possible side effects. To return next week for next infusion.

## 2021-09-23 ENCOUNTER — HOSPITAL ENCOUNTER (OUTPATIENT)
Dept: ONCOLOGY | Age: 81
Setting detail: INFUSION SERIES
Discharge: HOME | End: 2021-09-23
Payer: COMMERCIAL

## 2021-09-23 VITALS
DIASTOLIC BLOOD PRESSURE: 55 MMHG | RESPIRATION RATE: 16 BRPM | TEMPERATURE: 96.9 F | SYSTOLIC BLOOD PRESSURE: 91 MMHG | HEART RATE: 82 BPM

## 2021-09-23 DIAGNOSIS — D50.9 IRON DEFICIENCY ANEMIA, UNSPECIFIED IRON DEFICIENCY ANEMIA TYPE: ICD-10-CM

## 2021-09-23 DIAGNOSIS — I50.32 CHRONIC DIASTOLIC CONGESTIVE HEART FAILURE (HCC): Primary | ICD-10-CM

## 2021-09-23 PROCEDURE — 96365 THER/PROPH/DIAG IV INF INIT: CPT

## 2021-09-23 PROCEDURE — 2580000003 HC RX 258: Performed by: INTERNAL MEDICINE

## 2021-09-23 PROCEDURE — 6360000002 HC RX W HCPCS: Performed by: INTERNAL MEDICINE

## 2021-09-23 RX ORDER — SODIUM CHLORIDE 0.9 % (FLUSH) 0.9 %
5-40 SYRINGE (ML) INJECTION PRN
Status: DISCONTINUED | OUTPATIENT
Start: 2021-09-23 | End: 2021-09-24 | Stop reason: HOSPADM

## 2021-09-23 RX ORDER — SODIUM CHLORIDE 9 MG/ML
INJECTION, SOLUTION INTRAVENOUS CONTINUOUS
Status: ACTIVE | OUTPATIENT
Start: 2021-09-23 | End: 2021-09-23

## 2021-09-23 RX ORDER — SODIUM CHLORIDE 0.9 % (FLUSH) 0.9 %
5-40 SYRINGE (ML) INJECTION PRN
Status: CANCELLED | OUTPATIENT
Start: 2021-09-23

## 2021-09-23 RX ORDER — SODIUM CHLORIDE 9 MG/ML
INJECTION, SOLUTION INTRAVENOUS CONTINUOUS
Status: CANCELLED | OUTPATIENT
Start: 2021-09-23

## 2021-09-23 RX ADMIN — Medication 10 ML: at 08:09

## 2021-09-23 RX ADMIN — Medication 200 MG: at 08:11

## 2021-09-23 RX ADMIN — SODIUM CHLORIDE: 9 INJECTION, SOLUTION INTRAVENOUS at 08:10

## 2021-09-23 NOTE — PROGRESS NOTES
Patient to department in wheelchair with son at side for last dose of venofer. Tolerated venofer infusion well with no adverse rx noted. Given avs with information about venofer. Verbally told about most common possible side effects. To return next week for next infusion.

## 2021-09-24 ENCOUNTER — HOSPITAL ENCOUNTER (OUTPATIENT)
Age: 81
Discharge: HOME | End: 2021-09-24
Payer: COMMERCIAL

## 2021-09-24 ENCOUNTER — OFFICE VISIT (OUTPATIENT)
Dept: CARDIOLOGY CLINIC | Age: 81
End: 2021-09-24
Payer: COMMERCIAL

## 2021-09-24 VITALS
HEIGHT: 65 IN | SYSTOLIC BLOOD PRESSURE: 96 MMHG | OXYGEN SATURATION: 93 % | WEIGHT: 158 LBS | BODY MASS INDEX: 26.33 KG/M2 | DIASTOLIC BLOOD PRESSURE: 50 MMHG | HEART RATE: 98 BPM

## 2021-09-24 DIAGNOSIS — E55.9 VITAMIN D DEFICIENCY: ICD-10-CM

## 2021-09-24 DIAGNOSIS — Z98.61 CAD S/P PERCUTANEOUS CORONARY ANGIOPLASTY: ICD-10-CM

## 2021-09-24 DIAGNOSIS — I50.22 CHRONIC SYSTOLIC HEART FAILURE (HCC): ICD-10-CM

## 2021-09-24 DIAGNOSIS — I10 ESSENTIAL HYPERTENSION: ICD-10-CM

## 2021-09-24 DIAGNOSIS — I25.10 CAD S/P PERCUTANEOUS CORONARY ANGIOPLASTY: ICD-10-CM

## 2021-09-24 DIAGNOSIS — I44.7 LBBB (LEFT BUNDLE BRANCH BLOCK): ICD-10-CM

## 2021-09-24 DIAGNOSIS — I50.22 CHRONIC SYSTOLIC HEART FAILURE (HCC): Primary | ICD-10-CM

## 2021-09-24 DIAGNOSIS — J84.10 PULMONARY FIBROSIS (HCC): ICD-10-CM

## 2021-09-24 LAB
ANION GAP SERPL CALCULATED.3IONS-SCNC: 15 MMOL/L (ref 3–16)
BUN BLDV-MCNC: 33 MG/DL (ref 7–20)
CALCIUM SERPL-MCNC: 9.8 MG/DL (ref 8.3–10.6)
CHLORIDE BLD-SCNC: 95 MMOL/L (ref 99–110)
CO2: 27 MMOL/L (ref 21–32)
CREAT SERPL-MCNC: 1.4 MG/DL (ref 0.8–1.3)
GFR AFRICAN AMERICAN: 59
GFR NON-AFRICAN AMERICAN: 49
GLUCOSE BLD-MCNC: 281 MG/DL (ref 70–99)
HCT VFR BLD CALC: 41 % (ref 40.5–52.5)
HEMOGLOBIN: 13.7 G/DL (ref 13.5–17.5)
MCH RBC QN AUTO: 29.5 PG (ref 26–34)
MCHC RBC AUTO-ENTMCNC: 33.3 G/DL (ref 31–36)
MCV RBC AUTO: 88.6 FL (ref 80–100)
PDW BLD-RTO: 16.2 % (ref 12.4–15.4)
PLATELET # BLD: 208 K/UL (ref 135–450)
PMV BLD AUTO: 10.8 FL (ref 5–10.5)
POTASSIUM SERPL-SCNC: 5.1 MMOL/L (ref 3.5–5.1)
PRO-BNP: 1434 PG/ML (ref 0–449)
RBC # BLD: 4.63 M/UL (ref 4.2–5.9)
SODIUM BLD-SCNC: 137 MMOL/L (ref 136–145)
T4 FREE: 1 NG/DL (ref 0.9–1.8)
TSH REFLEX: 5.46 UIU/ML (ref 0.27–4.2)
VITAMIN D 25-HYDROXY: 43.3 NG/ML
WBC # BLD: 11 K/UL (ref 4–11)

## 2021-09-24 PROCEDURE — 80048 BASIC METABOLIC PNL TOTAL CA: CPT

## 2021-09-24 PROCEDURE — 99214 OFFICE O/P EST MOD 30 MIN: CPT | Performed by: CLINICAL NURSE SPECIALIST

## 2021-09-24 PROCEDURE — 36415 COLL VENOUS BLD VENIPUNCTURE: CPT

## 2021-09-24 PROCEDURE — 85027 COMPLETE CBC AUTOMATED: CPT

## 2021-09-24 PROCEDURE — 82306 VITAMIN D 25 HYDROXY: CPT

## 2021-09-24 PROCEDURE — 84443 ASSAY THYROID STIM HORMONE: CPT

## 2021-09-24 PROCEDURE — 83880 ASSAY OF NATRIURETIC PEPTIDE: CPT

## 2021-09-24 PROCEDURE — 84439 ASSAY OF FREE THYROXINE: CPT

## 2021-09-24 NOTE — PATIENT INSTRUCTIONS
1.  Continue all current medications  2. Check blood work today including vitamin d and thyroid  3. RTO in 6 weeks  4. Call if weight up 3 pounds in a day or 5 in a week  5.   Consider cardiac rehab after ICD is placed

## 2021-09-27 ENCOUNTER — TELEPHONE (OUTPATIENT)
Dept: CARDIOLOGY CLINIC | Age: 81
End: 2021-09-27

## 2021-09-27 NOTE — TELEPHONE ENCOUNTER
----- Message from ROLO Davis - CNS sent at 9/27/2021 10:07 AM EDT -----  Wolfgang  Can you please review TSH, little up  Thanks  Erendira

## 2021-09-27 NOTE — TELEPHONE ENCOUNTER
Called son Mayra Jensen and relayed message below with verbal understanding from son and encouraged him to call office with any other questions.

## 2021-09-27 NOTE — TELEPHONE ENCOUNTER
----- Message from ROLO Garcia - CNS sent at 9/27/2021 10:08 AM EDT -----  Call his son, Vitaly Osorio much improved  Continue all current medications  Alice TSH up a little and I sent to his PCP  thanks

## 2021-09-27 NOTE — TELEPHONE ENCOUNTER
Called Pita Conte the son and relayed message per [facility] with verbal understanding form son and encouraged him to call office with any other questions.

## 2021-10-08 ENCOUNTER — APPOINTMENT (OUTPATIENT)
Dept: GENERAL RADIOLOGY | Age: 81
End: 2021-10-08
Attending: INTERNAL MEDICINE
Payer: COMMERCIAL

## 2021-10-08 ENCOUNTER — NURSE ONLY (OUTPATIENT)
Dept: CARDIOLOGY CLINIC | Age: 81
End: 2021-10-08

## 2021-10-08 ENCOUNTER — TELEPHONE (OUTPATIENT)
Dept: CARDIOLOGY CLINIC | Age: 81
End: 2021-10-08

## 2021-10-08 PROCEDURE — 71045 X-RAY EXAM CHEST 1 VIEW: CPT

## 2021-10-08 NOTE — PROGRESS NOTES
Remote transmission received for patients CRT-D. Transmission shows normal sensing and pacing function. EP physician will review. See interrogation under the cardiology tab in the [address] [place] [address] [address] field for more details. Will continue to monitor remotely. CRTD implanted 10/8/21. Pt given carelink home monitor at implant. Initial set up of carelink completed and transmission received. Follow up as scheduled.

## 2021-10-11 DIAGNOSIS — Z95.810 ICD (IMPLANTABLE CARDIOVERTER-DEFIBRILLATOR), BIVENTRICULAR, IN SITU: Primary | ICD-10-CM

## 2021-10-11 DIAGNOSIS — I25.5 ISCHEMIC CARDIOMYOPATHY: ICD-10-CM

## 2021-10-11 DIAGNOSIS — I50.32 CHRONIC DIASTOLIC CONGESTIVE HEART FAILURE (HCC): ICD-10-CM

## 2021-10-18 ENCOUNTER — NURSE ONLY (OUTPATIENT)
Dept: CARDIOLOGY CLINIC | Age: 81
End: 2021-10-18
Payer: COMMERCIAL

## 2021-10-18 ENCOUNTER — TELEPHONE (OUTPATIENT)
Dept: CARDIOLOGY CLINIC | Age: 81
End: 2021-10-18

## 2021-10-18 DIAGNOSIS — Z95.810 ICD (IMPLANTABLE CARDIOVERTER-DEFIBRILLATOR), BIVENTRICULAR, IN SITU: ICD-10-CM

## 2021-10-18 DIAGNOSIS — I25.5 ISCHEMIC CARDIOMYOPATHY: ICD-10-CM

## 2021-10-18 DIAGNOSIS — I25.5 ISCHEMIC CARDIOMYOPATHY: Primary | ICD-10-CM

## 2021-10-18 DIAGNOSIS — I50.32 CHRONIC DIASTOLIC CONGESTIVE HEART FAILURE (HCC): ICD-10-CM

## 2021-10-18 PROCEDURE — 93284 PRGRMG EVAL IMPLANTABLE DFB: CPT | Performed by: INTERNAL MEDICINE

## 2021-10-18 NOTE — PROGRESS NOTES
Patient comes in for programming evaluation for his defibrillator. All sensing and pacing parameters are within normal range. S/p mdtbivicd on 10/8/2021. Battery life 6.6 years  AP <0.1%.  98.3%. No episodes noted. Patient remains on Coreg. Patients incision is healing nicely. Swelling noted. Patient to call the office immediately with any signs on infection. No changes need to be made at this time. Please see interrogation for more detail. Optivol is initializing. Patient will follow up in 3 months in office or remotely.

## 2021-10-18 NOTE — TELEPHONE ENCOUNTER
Patient was seen in the device clinic today for 1 week wound/device check. Patient would like to know if Dr. Bahman Colby could put a referral in to cardiac rehab and when he would be able to start. Please advise.      Thanks

## 2021-10-18 NOTE — TELEPHONE ENCOUNTER
Referral placed for cardiac rehab. He may begin cardiac rehab now, but he must follow left arm restrictions for the next month (No lifting, pushing, pulling >10 lbs).     Shanae Biggs, APRN-CNP

## 2021-10-22 ENCOUNTER — PATIENT MESSAGE (OUTPATIENT)
Dept: CARDIOLOGY CLINIC | Age: 81
End: 2021-10-22

## 2021-10-22 RX ORDER — SACUBITRIL AND VALSARTAN 24; 26 MG/1; MG/1
TABLET, FILM COATED ORAL
Qty: 90 TABLET | Refills: 3 | Status: SHIPPED | OUTPATIENT
Start: 2021-10-22 | End: 2022-02-11 | Stop reason: SDUPTHER

## 2021-10-22 NOTE — TELEPHONE ENCOUNTER
From: Vinicius Bassett  To: Gordy Lesch, APRN - CNP  Sent: 10/22/2021 9:54 AM EDT  Subject: Prescription Question    Hello Ms. Heather Lopez,    My dad needs a refill for Entresto 24 - 26mg. He takes half a tablet twice a day. I would appreciate it if you can do 90-day refills at your earliest convenience. Many thanks in advance for your help.     We will see you on Nov. 4    Deshawn Parikh (Julius's son)

## 2021-11-03 ENCOUNTER — HOSPITAL ENCOUNTER (OUTPATIENT)
Age: 81
Discharge: HOME | End: 2021-11-03
Payer: COMMERCIAL

## 2021-11-03 DIAGNOSIS — I50.22 CHRONIC SYSTOLIC HEART FAILURE (HCC): ICD-10-CM

## 2021-11-03 LAB
ANION GAP SERPL CALCULATED.3IONS-SCNC: 13 MMOL/L (ref 3–16)
BUN BLDV-MCNC: 44 MG/DL (ref 7–20)
CALCIUM SERPL-MCNC: 9.4 MG/DL (ref 8.3–10.6)
CHLORIDE BLD-SCNC: 95 MMOL/L (ref 99–110)
CO2: 27 MMOL/L (ref 21–32)
CREAT SERPL-MCNC: 1.3 MG/DL (ref 0.8–1.3)
GFR AFRICAN AMERICAN: >60
GFR NON-AFRICAN AMERICAN: 53
GLUCOSE BLD-MCNC: 374 MG/DL (ref 70–99)
POTASSIUM SERPL-SCNC: 4.9 MMOL/L (ref 3.5–5.1)
PRO-BNP: 891 PG/ML (ref 0–449)
SODIUM BLD-SCNC: 135 MMOL/L (ref 136–145)

## 2021-11-03 PROCEDURE — 83880 ASSAY OF NATRIURETIC PEPTIDE: CPT

## 2021-11-03 PROCEDURE — 80048 BASIC METABOLIC PNL TOTAL CA: CPT

## 2021-11-03 PROCEDURE — 80162 ASSAY OF DIGOXIN TOTAL: CPT

## 2021-11-03 PROCEDURE — 36415 COLL VENOUS BLD VENIPUNCTURE: CPT

## 2021-11-05 ENCOUNTER — OFFICE VISIT (OUTPATIENT)
Dept: CARDIOLOGY CLINIC | Age: 81
End: 2021-11-05
Payer: COMMERCIAL

## 2021-11-05 VITALS
WEIGHT: 159.9 LBS | HEART RATE: 94 BPM | HEIGHT: 65 IN | BODY MASS INDEX: 26.64 KG/M2 | OXYGEN SATURATION: 96 % | SYSTOLIC BLOOD PRESSURE: 90 MMHG | DIASTOLIC BLOOD PRESSURE: 56 MMHG

## 2021-11-05 DIAGNOSIS — J84.10 PULMONARY FIBROSIS (HCC): ICD-10-CM

## 2021-11-05 DIAGNOSIS — I10 ESSENTIAL HYPERTENSION: ICD-10-CM

## 2021-11-05 DIAGNOSIS — E55.9 VITAMIN D DEFICIENCY: ICD-10-CM

## 2021-11-05 DIAGNOSIS — I25.10 CAD S/P PERCUTANEOUS CORONARY ANGIOPLASTY: ICD-10-CM

## 2021-11-05 DIAGNOSIS — I50.22 CHRONIC SYSTOLIC HEART FAILURE (HCC): Primary | ICD-10-CM

## 2021-11-05 DIAGNOSIS — Z98.61 CAD S/P PERCUTANEOUS CORONARY ANGIOPLASTY: ICD-10-CM

## 2021-11-05 DIAGNOSIS — I44.7 LBBB (LEFT BUNDLE BRANCH BLOCK): ICD-10-CM

## 2021-11-05 DIAGNOSIS — Z95.810 ICD (IMPLANTABLE CARDIOVERTER-DEFIBRILLATOR) IN PLACE: ICD-10-CM

## 2021-11-05 LAB — DIGOXIN LEVEL: 0.6 NG/ML (ref 0.8–2)

## 2021-11-05 PROCEDURE — 93296 REM INTERROG EVL PM/IDS: CPT | Performed by: INTERNAL MEDICINE

## 2021-11-05 PROCEDURE — 99214 OFFICE O/P EST MOD 30 MIN: CPT | Performed by: CLINICAL NURSE SPECIALIST

## 2021-11-05 PROCEDURE — 93297 REM INTERROG DEV EVAL ICPMS: CPT | Performed by: INTERNAL MEDICINE

## 2021-11-05 PROCEDURE — 93295 DEV INTERROG REMOTE 1/2/MLT: CPT | Performed by: INTERNAL MEDICINE

## 2021-11-05 RX ORDER — TORSEMIDE 100 MG/1
TABLET ORAL
Qty: 30 TABLET | Refills: 0 | Status: SHIPPED
Start: 2021-11-05 | End: 2021-11-16

## 2021-11-05 NOTE — PATIENT INSTRUCTIONS
1.  Cut torsemide to 50 mg daily  2. Continue all other medications  3. Check digoxin level  4. Labs in 1 month with a visit  5. If BP still on lower side can change coreg to toprol  6.   Discuss with primary care regarding increasing farxiga to 10 mg daily

## 2021-11-05 NOTE — PROGRESS NOTES
[address]  Progress Note    Primary Care Doctor:  Natalie Storm MD    Chief Complaint   Patient presents with    Congestive Heart Failure    Coronary Artery Disease    1 Month Follow-Up     no cardio symptons        History of Present Illness:  80 y.o. male with history of CAD status post PTCA of circumflex/OM1 with JOANNA, pulmonary fibrosis 3/2021, hypertension, hyperlipidemia. LVEF 20% 4/21. C 4/20/21 successful PCI to LAD with JOANNA. Echo still with LVEF of 20% and moderate PHTN with 57mmHg. ICD placed 10/8/2021  [place] ([facility]) nationality      I had the pleasure of seeing Jeanette Gr in follow up for systolic heart failure. He is in a wheelchair with his son, Shawn Turcios. Son speaks English and does all the speaking although we have an interpretor. His labs done 11/3 show great improvement on bnp. He is complaining of some dizziness. He had his ICD placed 10/8/2021 (not much data from it yet). He continues to wear oxygen 2.5 L at night only. BP is on the soft side. He is on farxiga 5 mg daily and has not needed any metolazone. Weight is stable. He is still not up walking much. Past Medical History:   has a past medical history of Acute MI ([address] CAD (coronary artery disease), CHF (congestive heart failure), NYHA class III ([address] Diabetes mellitus ([address] HTN (hypertension), and Hyperlipidemia. Surgical History:   has a past surgical history that includes Cardiac surgery; eye surgery (Bilateral); Carotid endarterectomy (Left, 11/02/2016); and Coronary angioplasty with stent (05/31/2018). Social History:   reports that he has never smoked. He has never used smokeless tobacco. He reports that he does not drink alcohol and does not use drugs. Family History:   Family History   Problem Relation Age of Onset    Heart Attack Brother     Heart Disease Brother     Heart Attack Brother        Home Medications:  Prior to Admission medications    Medication Sig Start Date End Date Taking? Authorizing Provider   torsemide (DEMADEX) 100 MG tablet 50 mg daily 11/5/21  Yes ROLO Jimenez - TAWANNA   sacubitril-valsartan (ENTRESTO) 24-26 MG per tablet Taking 1/2 tablet twice a day 10/22/21  Yes ROLO Tovar CNP   digoxin (LANOXIN) 125 MCG tablet Take 1 tablet by mouth See Admin Instructions 1 tablet M,W,F, Sun 8/26/21  Yes Obie Wong MD   VITAMIN D PO Take by mouth   Yes Historical Provider, MD   carvedilol (COREG) 3.125 MG tablet 1 tab in the am and 2 tabs in the pm 8/4/21  Yes ROLO Tovar CNP   spironolactone (ALDACTONE) 25 MG tablet Take 0.5 tablets by mouth daily 6/21/21  Yes ROLO Tovar CNP   Nebulizers MISC by Does not apply route   Yes Historical Provider, MD   OXYGEN Inhale into the lungs 2.5 L at HS   Yes Historical Provider, MD   ipratropium-albuterol (DUONEB) 0.5-2.5 (3) MG/3ML SOLN nebulizer solution Inhale 3 mLs into the lungs every 4 hours 5/6/21  Yes Kam Mojica MD   albuterol sulfate HFA (VENTOLIN HFA) 108 (90 Base) MCG/ACT inhaler Inhale 2 puffs into the lungs 4 times daily as needed for Wheezing 4/13/21  Yes Kam Mojica MD   atorvastatin (LIPITOR) 40 MG tablet TAKE ONE TABLET BY MOUTH DAILY 3/9/20  Yes Almas Nagel MD   dapagliflozin (FARXIGA) 5 MG tablet Take 5 mg by mouth every morning   Yes Historical Provider, MD   amitriptyline (ELAVIL) 25 MG tablet Take 25 mg by mouth nightly   Yes Historical Provider, MD   clopidogrel (PLAVIX) 75 MG tablet Take 1 tablet by mouth daily 2/7/19  Yes Almas Nagel MD   tolterodine (DETROL) 2 MG tablet Take 2 mg by mouth 2 times daily   Yes Historical Provider, MD   Dulaglutide (TRULICITY) 6.06 UO/7.6MR SOPN Inject into the skin   Yes Historical Provider, MD   famotidine (PEPCID) 20 MG tablet Take 20 mg by mouth 2 times daily   Yes Historical Provider, MD   nitroGLYCERIN (NITROSTAT) 0.4 MG SL tablet Place 1 tablet under the tongue every 5 minutes as needed for Chest pain (No more than 3 tablets in a 15 minute period. ). 6/12/14  Yes Ranjit Cooper MD   glucose blood VI test strips (ASCENSIA AUTODISC VI;ONE TOUCH ULTRA TEST VI) strip 1 each by Does not apply route daily. As needed. Yes Historical Provider, MD   metformin (GLUCOPHAGE) 1000 MG tablet Take 1 tablet by mouth 2 times daily (with meals). Patient taking differently: Take 500 mg by mouth 2 times daily (with meals)  10/28/11  Yes Roscoe Ward MD   aspirin 81 MG EC tablet Take 81 mg by mouth daily. Yes Historical Provider, MD   ferrous sulfate 325 (65 FE) MG tablet Take 325 mg by mouth 2 times daily. Yes Historical Provider, MD   gabapentin (NEURONTIN) 600 MG tablet Take 600 mg by mouth 2 times daily. .   Yes Historical Provider, MD   therapeutic multivitamin-minerals (THERAGRAN-M) tablet Take 2 tablets by mouth daily. Yes Historical Provider, MD   insulin glargine (BASAGLAR KWIKPEN) 100 UNIT/ML injection pen Inject 50 Units into the skin nightly     Historical Provider, MD        Allergies:  Penicillins     Review of Systems:   · Constitutional: there has been no unanticipated weight loss. There's been no change in energy level, sleep pattern, or activity level. · Eyes: No visual changes or diplopia. No scleral icterus. · ENT: No Headaches, hearing loss or vertigo. No mouth sores or sore throat. · Cardiovascular: Reviewed in HPI  · Respiratory: No cough or wheezing, no sputum production. No hematemesis. · Gastrointestinal: No abdominal pain, appetite loss, blood in stools. No change in bowel or bladder habits. · Genitourinary: No dysuria, trouble voiding, or hematuria. · Musculoskeletal:  No gait disturbance, weakness or joint complaints. · Integumentary: No rash or pruritis. · Neurological: No headache, diplopia, change in muscle strength, numbness or tingling. No change in gait, balance, coordination, mood, affect, memory, mentation, behavior.   · Psychiatric: No anxiety, no depression. · Endocrine: No malaise, fatigue or temperature intolerance. No excessive thirst, fluid intake, or urination. No tremor. · Hematologic/Lymphatic: No abnormal bruising or bleeding, blood clots or swollen lymph nodes. · Allergic/Immunologic: No nasal congestion or hives. Physical Examination:    Vitals:    11/05/21 0911   BP: (!) 90/56   Pulse: 94   SpO2: 96%   Weight: 159 lb 14.4 oz (72.5 kg)   Height: 5' 5\" (1.651 m)        Constitutional and General Appearance: Warm and dry, no apparent distress, normal coloration  HEENT:  Normocephalic, atraumatic  Respiratory:  · Normal excursion and expansion without use of accessory muscles  · Resp Auscultation: Normal breath sounds without dullness  Cardiovascular:  · The apical impulses not displaced  · Heart tones are crisp and normal  · JVP normal cm H2O  · Regular rate and rhythm  · Peripheral pulses are symmetrical and full  · There is no clubbing, cyanosis of the extremities.   · no edema  · Pedal Pulses: 2+ and equal   Abdomen:  · No masses or tenderness  · Liver/Spleen: No Abnormalities Noted  Neurological/Psychiatric:  · Alert and oriented in all spheres  · Moves all extremities well  · Exhibits normal gait balance and coordination  · No abnormalities of mood, affect, memory, mentation, or behavior are noted    Lab Data:    CBC:   Lab Results   Component Value Date    WBC 11.0 10/08/2021    WBC 11.0 09/24/2021    WBC 10.1 08/26/2021    RBC 4.73 10/08/2021    RBC 4.63 09/24/2021    RBC 4.12 08/26/2021    HGB 13.9 10/08/2021    HGB 13.7 09/24/2021    HGB 11.9 08/26/2021    HCT 41.9 10/08/2021    HCT 41.0 09/24/2021    HCT 35.9 08/26/2021    MCV 88.6 10/08/2021    MCV 88.6 09/24/2021    MCV 87.1 08/26/2021    RDW 15.4 10/08/2021    RDW 16.2 09/24/2021    RDW 17.2 08/26/2021     10/08/2021     09/24/2021     08/26/2021     BMP:  Lab Results   Component Value Date     11/03/2021     10/08/2021     09/24/2021    K 4.9 11/03/2021    K 4.0 10/08/2021    K 5.1 09/24/2021    K 3.7 03/04/2021    CL 95 11/03/2021    CL 95 10/08/2021    CL 95 09/24/2021    CO2 27 11/03/2021    CO2 27 10/08/2021    CO2 27 09/24/2021    BUN 44 11/03/2021    BUN 39 10/08/2021    BUN 33 09/24/2021    CREATININE 1.3 11/03/2021    CREATININE 1.3 10/08/2021    CREATININE 1.4 09/24/2021     BNP:   Lab Results   Component Value Date    PROBNP 891 11/03/2021    PROBNP 1,434 09/24/2021    PROBNP 2,108 09/09/2021     Cardiac Imaging:  Echo: 08/23/21   Left ventricular systolic function is reduced with ejection fraction   estimated at 20 %. Akinesis of the inferior, septal and apical septal walls with severe   hypokinesis of the remaining walls. Left ventricular cavity size is mildly dilated with mild concentric left   ventricular hypertrophy. Grade III diastolic dysfunction with elevated filling pressure. TAPSE is measured at 12.9 mm.   S'' prime velocity is measured at 6.2 cm/s. Right ventricular systolic function is moderately reduced . Biatrial enlargement. The aortic root is mildly dilated. Moderate mitral and tricuspid regurgitation. Systolic pulmonary artery pressure (SPAP) estimated at 57 mmHg (right atrial   pressure 8 mmHg), consistent with moderate pulmonary hypertension. Cardiac cath: 04/28/21  Intervention  ~Successful PCI to LAD with 3.0x32 JOANNA. PD with 3.5x15 NC to 22atm. Excellent Result. Contrast: 77  Flouro Time: 17.2  Access: R CFA/CFV. Perc stick in sfa (high bifurcation)     Impression  ~Coronary Angiography w/ severe single vessel CAD  ~LVG with LVEF of 20 and LAD regional wall motion abnormalities  ~Successful complex angioplasty and stenting of LAD    Assessment:    1. Chronic systolic heart failure (HCC) on arni, bb, digoxin, aldosterone antagonist and farxiga (5 mg daily)   2. Vitamin D deficiency    3. Essential hypertension    4. LBBB (left bundle branch block)    5.  CAD S/P percutaneous coronary angioplasty 6. Pulmonary fibrosis (HCC) on 2.5 L of oxygen at night   7. ICD in place      Plan:   Patient Instructions   1. Cut torsemide to 50 mg daily  2. Continue all other medications  3. Check digoxin level  4. Labs in 1 month with a visit  5. If BP still on lower side can change coreg to toprol  6.   Discuss with primary care regarding increasing farxiga to 10 mg daily      NYHA IV    I appreciate the opportunity of cooperating in the care of this individual.    Silvestre Lau, APRN - CNS, CNS, 11/5/2021, 10:08 AM

## 2021-11-11 ENCOUNTER — NURSE ONLY (OUTPATIENT)
Dept: CARDIOLOGY CLINIC | Age: 81
End: 2021-11-11
Payer: COMMERCIAL

## 2021-11-11 DIAGNOSIS — I50.32 CHRONIC DIASTOLIC CONGESTIVE HEART FAILURE (HCC): ICD-10-CM

## 2021-11-11 DIAGNOSIS — Z95.810 ICD (IMPLANTABLE CARDIOVERTER-DEFIBRILLATOR), BIVENTRICULAR, IN SITU: ICD-10-CM

## 2021-11-11 DIAGNOSIS — I25.5 ISCHEMIC CARDIOMYOPATHY: ICD-10-CM

## 2021-11-11 NOTE — PROGRESS NOTES
We received remote transmission from patient's monitor at home. Transmission shows normal sensing and pacing function. Pt has AF recordings. This appears to be new for pt. Pt is not anti coags. Message sent to office staff. EP physician will review. See interrogation under cardiology tab in the [address] [place] [address] [address] field for more details. Optivol is within normal range.

## 2021-11-14 DIAGNOSIS — I50.22 CHRONIC SYSTOLIC HEART FAILURE (HCC): ICD-10-CM

## 2021-11-16 RX ORDER — TORSEMIDE 100 MG/1
TABLET ORAL
Qty: 30 TABLET | Refills: 1 | Status: SHIPPED | OUTPATIENT
Start: 2021-11-16 | End: 2021-12-10 | Stop reason: SDUPTHER

## 2021-11-16 RX ORDER — METOLAZONE 5 MG/1
TABLET ORAL
Qty: 30 TABLET | Refills: 0 | OUTPATIENT
Start: 2021-11-16

## 2021-11-18 ENCOUNTER — HOSPITAL ENCOUNTER (OUTPATIENT)
Dept: CARDIAC REHAB | Age: 81
Setting detail: THERAPIES SERIES
Discharge: HOME | End: 2021-11-18
Payer: COMMERCIAL

## 2021-11-18 VITALS
HEART RATE: 106 BPM | WEIGHT: 156.6 LBS | RESPIRATION RATE: 16 BRPM | DIASTOLIC BLOOD PRESSURE: 74 MMHG | HEIGHT: 65 IN | BODY MASS INDEX: 26.09 KG/M2 | OXYGEN SATURATION: 90 % | SYSTOLIC BLOOD PRESSURE: 126 MMHG

## 2021-11-18 PROCEDURE — 93798 PHYS/QHP OP CAR RHAB W/ECG: CPT

## 2021-11-18 NOTE — PROGRESS NOTES
[facility] Cardiac Rehabilitation Initial Evaluation    Abbie Sesay       1940     [id number]    Share medical information:  Yes Roberto Rom (son):  [phone]    Cardiac History    EF:  20% (2021)  CHF - last admission:  3/4/2021    Physical Assessment     General Appearance   Height:  5' 5\" (165.1 cm)  Weight:  156 lb 9.6 oz (71 kg) (156.6 lb)   BMI:  26.1  Skin color:  Exceptions to WDL Skin Color: Appropriate for ethnicity    Cardiovascular Assessment  BP Sittin/74 (right arm sitting)  Sittin/68 (Left arm)  Standin/60 (right arm)  Heart rate:   106 Monitor   Heart sounds:   Regular S1, S2, No adventitious heart sounds    Respiratory Assessment  Resp rate: 16     Regular  Normal  L Breath Sounds: Clear   R Breath Sounds: Clear  SpO2:  90 %  Quality/Effort:   Dyspnea with exertion  Sleep Apnea:  No  CPAP  No  Oxygen  Yes     Sleeping Habits:  Pt. Uses O2 at 2.5 LPM at night. Edema:  No      Orthopedic/Exercise Limitations:  No    Pain:   Do you have pain?:  no       Fall Risk Assessment     History of falling with or without injury: No  Use of ambulatory aid: Yes (cane)  Difficulty walking/impaired gait: No  Numbness in feet: No  Vision changes: No  Dizziness: Yes  Shortness of breath: Yes  Current medications include but not limited to: Anticoagulant, ACE, ARB, Beta  Blocker  Other fall risk : No  Outpatient fall risk intervention strategies: Fall risk education provided    Abuse / Neglect  Physical/behavioral signs of abuse/neglect   No    Do you feel safe at home   Yes    Advanced Directives  Patient has Advanced Directives:  Yes  Patient given Advanced Directive pack:  Declined    Vaccinations  Influenza (annual):  Yes  Pneumonia:  No - do not know if up to date    Pt. Arrived to Cardiopulmonary rehab for his initial interview and evaluation for Cardiac rehab. Pt's evaluation was completed using [facility] and [facility] Number [id number]. Son sat in the evaluation as well. Pt.'s insurance was reviewed and pt. Was advised to contact insurance provider if they had any questions or concerns. PMHX and PSHX as well as home medications were reviewed and verified by Son. Plan of care was reviewed and goal set by pt. And agreed upon. Pt. Completed 6 minute walk test at this time. Pt.  To attend the 4:00 PM class time beginning Monday November 29,2021    MADIHA DUNLAP RN  11/18/2021

## 2021-11-29 ENCOUNTER — HOSPITAL ENCOUNTER (OUTPATIENT)
Dept: CARDIAC REHAB | Age: 81
Setting detail: THERAPIES SERIES
Discharge: HOME | End: 2021-11-29
Payer: COMMERCIAL

## 2021-12-01 ENCOUNTER — HOSPITAL ENCOUNTER (OUTPATIENT)
Dept: CARDIAC REHAB | Age: 81
Setting detail: THERAPIES SERIES
Discharge: HOME | End: 2021-12-01
Payer: COMMERCIAL

## 2021-12-03 ENCOUNTER — APPOINTMENT (OUTPATIENT)
Dept: CARDIAC REHAB | Age: 81
End: 2021-12-03
Payer: COMMERCIAL

## 2021-12-06 ENCOUNTER — HOSPITAL ENCOUNTER (OUTPATIENT)
Dept: CARDIAC REHAB | Age: 81
Setting detail: THERAPIES SERIES
Discharge: HOME | End: 2021-12-06
Payer: COMMERCIAL

## 2021-12-06 PROCEDURE — 93798 PHYS/QHP OP CAR RHAB W/ECG: CPT

## 2021-12-08 ENCOUNTER — HOSPITAL ENCOUNTER (OUTPATIENT)
Dept: CARDIAC REHAB | Age: 81
Setting detail: THERAPIES SERIES
Discharge: HOME | End: 2021-12-08
Payer: COMMERCIAL

## 2021-12-10 ENCOUNTER — HOSPITAL ENCOUNTER (OUTPATIENT)
Age: 81
Discharge: HOME | End: 2021-12-10
Payer: COMMERCIAL

## 2021-12-10 ENCOUNTER — HOSPITAL ENCOUNTER (OUTPATIENT)
Dept: CARDIAC REHAB | Age: 81
Setting detail: THERAPIES SERIES
Discharge: HOME | End: 2021-12-10
Payer: COMMERCIAL

## 2021-12-10 ENCOUNTER — HOSPITAL ENCOUNTER (OUTPATIENT)
Dept: GENERAL RADIOLOGY | Age: 81
Discharge: HOME | End: 2021-12-10
Payer: COMMERCIAL

## 2021-12-10 ENCOUNTER — OFFICE VISIT (OUTPATIENT)
Dept: PULMONOLOGY | Age: 81
End: 2021-12-10
Payer: COMMERCIAL

## 2021-12-10 ENCOUNTER — TELEPHONE (OUTPATIENT)
Dept: CARDIOLOGY CLINIC | Age: 81
End: 2021-12-10

## 2021-12-10 ENCOUNTER — OFFICE VISIT (OUTPATIENT)
Dept: CARDIOLOGY CLINIC | Age: 81
End: 2021-12-10
Payer: COMMERCIAL

## 2021-12-10 VITALS
HEIGHT: 65 IN | OXYGEN SATURATION: 94 % | HEART RATE: 102 BPM | DIASTOLIC BLOOD PRESSURE: 70 MMHG | BODY MASS INDEX: 25.66 KG/M2 | SYSTOLIC BLOOD PRESSURE: 120 MMHG | WEIGHT: 154 LBS

## 2021-12-10 VITALS
WEIGHT: 154 LBS | SYSTOLIC BLOOD PRESSURE: 120 MMHG | DIASTOLIC BLOOD PRESSURE: 70 MMHG | OXYGEN SATURATION: 94 % | HEIGHT: 65 IN | BODY MASS INDEX: 25.66 KG/M2 | HEART RATE: 107 BPM

## 2021-12-10 DIAGNOSIS — I25.10 CAD S/P PERCUTANEOUS CORONARY ANGIOPLASTY: ICD-10-CM

## 2021-12-10 DIAGNOSIS — I44.7 LBBB (LEFT BUNDLE BRANCH BLOCK): ICD-10-CM

## 2021-12-10 DIAGNOSIS — Z95.810 ICD (IMPLANTABLE CARDIOVERTER-DEFIBRILLATOR) IN PLACE: ICD-10-CM

## 2021-12-10 DIAGNOSIS — I10 ESSENTIAL HYPERTENSION: ICD-10-CM

## 2021-12-10 DIAGNOSIS — Z98.61 CAD S/P PERCUTANEOUS CORONARY ANGIOPLASTY: ICD-10-CM

## 2021-12-10 DIAGNOSIS — E55.9 VITAMIN D DEFICIENCY: ICD-10-CM

## 2021-12-10 DIAGNOSIS — J84.112 IPF (IDIOPATHIC PULMONARY FIBROSIS) (HCC): ICD-10-CM

## 2021-12-10 DIAGNOSIS — I50.22 CHRONIC SYSTOLIC HEART FAILURE (HCC): ICD-10-CM

## 2021-12-10 DIAGNOSIS — J84.10 PULMONARY FIBROSIS (HCC): ICD-10-CM

## 2021-12-10 DIAGNOSIS — I50.23 ACUTE ON CHRONIC SYSTOLIC CONGESTIVE HEART FAILURE (HCC): Primary | ICD-10-CM

## 2021-12-10 DIAGNOSIS — I50.22 CHRONIC SYSTOLIC HEART FAILURE (HCC): Primary | ICD-10-CM

## 2021-12-10 LAB
ANION GAP SERPL CALCULATED.3IONS-SCNC: 17 MMOL/L (ref 3–16)
BASOPHILS ABSOLUTE: 0.1 K/UL (ref 0–0.2)
BASOPHILS RELATIVE PERCENT: 0.6 %
BUN BLDV-MCNC: 51 MG/DL (ref 7–20)
CALCIUM SERPL-MCNC: 10 MG/DL (ref 8.3–10.6)
CHLORIDE BLD-SCNC: 95 MMOL/L (ref 99–110)
CHOLESTEROL, TOTAL: 111 MG/DL (ref 0–199)
CO2: 26 MMOL/L (ref 21–32)
CREAT SERPL-MCNC: 1.4 MG/DL (ref 0.8–1.3)
EOSINOPHILS ABSOLUTE: 0.4 K/UL (ref 0–0.6)
EOSINOPHILS RELATIVE PERCENT: 3.9 %
GFR AFRICAN AMERICAN: 59
GFR NON-AFRICAN AMERICAN: 49
GLUCOSE BLD-MCNC: 144 MG/DL (ref 70–99)
HCT VFR BLD CALC: 40.1 % (ref 40.5–52.5)
HDLC SERPL-MCNC: 38 MG/DL (ref 40–60)
HEMOGLOBIN: 13.3 G/DL (ref 13.5–17.5)
LDL CHOLESTEROL CALCULATED: 39 MG/DL
LYMPHOCYTES ABSOLUTE: 2.2 K/UL (ref 1–5.1)
LYMPHOCYTES RELATIVE PERCENT: 19.9 %
MCH RBC QN AUTO: 29.1 PG (ref 26–34)
MCHC RBC AUTO-ENTMCNC: 33.1 G/DL (ref 31–36)
MCV RBC AUTO: 87.9 FL (ref 80–100)
MONOCYTES ABSOLUTE: 1.3 K/UL (ref 0–1.3)
MONOCYTES RELATIVE PERCENT: 12 %
NEUTROPHILS ABSOLUTE: 7.2 K/UL (ref 1.7–7.7)
NEUTROPHILS RELATIVE PERCENT: 63.6 %
PDW BLD-RTO: 14.2 % (ref 12.4–15.4)
PLATELET # BLD: 260 K/UL (ref 135–450)
PMV BLD AUTO: 9.9 FL (ref 5–10.5)
POTASSIUM SERPL-SCNC: 4.4 MMOL/L (ref 3.5–5.1)
PRO-BNP: 1316 PG/ML (ref 0–449)
PROSTATE SPECIFIC ANTIGEN: 3.69 NG/ML (ref 0–4)
RBC # BLD: 4.56 M/UL (ref 4.2–5.9)
SODIUM BLD-SCNC: 138 MMOL/L (ref 136–145)
T4 FREE: 1.1 NG/DL (ref 0.9–1.8)
TRIGL SERPL-MCNC: 169 MG/DL (ref 0–150)
TSH REFLEX: 6.16 UIU/ML (ref 0.27–4.2)
VITAMIN D 25-HYDROXY: 55.3 NG/ML
VLDLC SERPL CALC-MCNC: 34 MG/DL
WBC # BLD: 11.3 K/UL (ref 4–11)

## 2021-12-10 PROCEDURE — 80048 BASIC METABOLIC PNL TOTAL CA: CPT

## 2021-12-10 PROCEDURE — 83036 HEMOGLOBIN GLYCOSYLATED A1C: CPT

## 2021-12-10 PROCEDURE — 99214 OFFICE O/P EST MOD 30 MIN: CPT | Performed by: CLINICAL NURSE SPECIALIST

## 2021-12-10 PROCEDURE — 93798 PHYS/QHP OP CAR RHAB W/ECG: CPT

## 2021-12-10 PROCEDURE — 80061 LIPID PANEL: CPT

## 2021-12-10 PROCEDURE — 82306 VITAMIN D 25 HYDROXY: CPT

## 2021-12-10 PROCEDURE — 85025 COMPLETE CBC W/AUTO DIFF WBC: CPT

## 2021-12-10 PROCEDURE — 84153 ASSAY OF PSA TOTAL: CPT

## 2021-12-10 PROCEDURE — 84439 ASSAY OF FREE THYROXINE: CPT

## 2021-12-10 PROCEDURE — 83880 ASSAY OF NATRIURETIC PEPTIDE: CPT

## 2021-12-10 PROCEDURE — 71046 X-RAY EXAM CHEST 2 VIEWS: CPT

## 2021-12-10 PROCEDURE — 36415 COLL VENOUS BLD VENIPUNCTURE: CPT

## 2021-12-10 PROCEDURE — 84443 ASSAY THYROID STIM HORMONE: CPT

## 2021-12-10 PROCEDURE — 99214 OFFICE O/P EST MOD 30 MIN: CPT | Performed by: INTERNAL MEDICINE

## 2021-12-10 RX ORDER — ATORVASTATIN CALCIUM 40 MG/1
40 TABLET, FILM COATED ORAL DAILY
Qty: 90 TABLET | Refills: 0 | Status: SHIPPED | OUTPATIENT
Start: 2021-12-10 | End: 2022-02-11 | Stop reason: SDUPTHER

## 2021-12-10 RX ORDER — METOPROLOL SUCCINATE 50 MG/1
50 TABLET, EXTENDED RELEASE ORAL DAILY
Qty: 30 TABLET | Refills: 3 | Status: SHIPPED | OUTPATIENT
Start: 2021-12-10 | End: 2022-01-07 | Stop reason: DRUGHIGH

## 2021-12-10 RX ORDER — TORSEMIDE 100 MG/1
TABLET ORAL
Qty: 90 TABLET | Refills: 1 | Status: SHIPPED | OUTPATIENT
Start: 2021-12-10 | End: 2022-01-07 | Stop reason: DRUGHIGH

## 2021-12-10 ASSESSMENT — ENCOUNTER SYMPTOMS
WHEEZING: 0
CHOKING: 0
CHEST TIGHTNESS: 0
BLOOD IN STOOL: 0
DIARRHEA: 0
BACK PAIN: 0
ABDOMINAL DISTENTION: 0
SINUS PRESSURE: 0
COUGH: 0
ANAL BLEEDING: 0
SORE THROAT: 0
RHINORRHEA: 0
CONSTIPATION: 0
STRIDOR: 0
VOICE CHANGE: 0
APNEA: 0
SHORTNESS OF BREATH: 1
ABDOMINAL PAIN: 0

## 2021-12-10 NOTE — TELEPHONE ENCOUNTER
----- Message from ROLO Mcmahon - CNS sent at 12/10/2021  4:00 PM EST -----  Chest xray is fine  Labs did show increased fluid like I suspected in OV today  Continue increased torsemide for a week and then check blood work again in about 10 days  Talk with his son, Sara Mariscal  thanks

## 2021-12-10 NOTE — PATIENT INSTRUCTIONS
1.  Chest xray today and labs  2. Change farxiga to 10 mg daily  3.  alternate [id number] mg and 50 mg  4. Consider decreasing gabapentin  5. Stop coreg and change to toprol 50 mg daily  6.   Jan 7th at 0800

## 2021-12-10 NOTE — PROGRESS NOTES
[address]  Progress Note    Primary Care Doctor:  Aramis Starks MD    Chief Complaint   Patient presents with    1 Month Follow-Up    Congestive Heart Failure    Shortness of Breath    Dizziness        History of Present Illness:  80 y.o. male with history of CAD status post PTCA of circumflex/OM1 with JOANNA, pulmonary fibrosis 3/2021, hypertension, hyperlipidemia. LVEF 20% 4/21. C 4/20/21 successful PCI to LAD with JOANNA. Echo still with LVEF of 20% and moderate PHTN with 57mmHg. ICD placed 10/8/2021  [place] nationality      I had the pleasure of seeing Sherel Showers in follow up for systolic heart failure. He is in a wheelchair with his son, Deshawn Parikh. [facility] His HR is elevated and son admits to cutting the coreg in half as his BP at home has been softer. His weight is down but he complains of pain on his right and left side when he takes a deep breath. He denies any chest pain, palpitations or edema. He does have some lightheadedness in the mornings. He is trying to do some cardiac rehab. [facility]  He is taking his farxiga 5 mg bid. Past Medical History:   has a past medical history of Acute MI ([address] CAD (coronary artery disease), CHF (congestive heart failure), NYHA class III ([address] Diabetes mellitus ([facility] [address] HTN (hypertension), and Hyperlipidemia. Surgical History:   has a past surgical history that includes Cardiac surgery; eye surgery (Bilateral); Carotid endarterectomy (Left, 11/02/2016); and Coronary angioplasty with stent (05/31/2018). Social History:   reports that he has never smoked. He has never used smokeless tobacco. He reports that he does not drink alcohol and does not use drugs.    Family History:   Family History   Problem Relation Age of Onset    Heart Attack Brother     Heart Disease Brother     Heart Attack Brother        Home Medications:  Prior to Admission medications    Medication Sig Start Date End Date Taking?  Authorizing Provider   metoprolol succinate (TOPROL XL) 50 MG extended release tablet Take 1 tablet by mouth daily 12/10/21  Yes ROLO Antoine   torsemide (DEMADEX) 100 MG tablet 50 mg alternating 100 mg and as directed 12/10/21  Yes ROLO Ignacio   atorvastatin (LIPITOR) 40 MG tablet Take 1 tablet by mouth daily 12/10/21  Yes ROLO Casey   spironolactone (ALDACTONE) 25 MG tablet TAKE ONE TABLET BY MOUTH TWICE A DAY  Patient taking differently: Take 12.5 mg by mouth daily  11/17/21  Yes ROLO Casey   apixaban (ELIQUIS) 5 MG TABS tablet Take 1 tablet by mouth 2 times daily  Patient taking differently: Take 5 mg by mouth daily  11/11/21  Yes ROLO Markham - CNP   sacubitril-valsartan (ENTRESTO) 24-26 MG per tablet Taking 1/2 tablet twice a day 10/22/21  Yes ROLO Vera - CNP   digoxin (LANOXIN) 125 MCG tablet Take 1 tablet by mouth See Admin Instructions 1 tablet M,W,F, Sun  Patient taking differently: Take 125 mcg by mouth See Admin Instructions 1 tablet every other day 8/26/21  Yes Pamela Jackson MD   VITAMIN D PO Take by mouth   Yes Historical Provider, MD   Nebulizers MISC by Does not apply route   Yes Historical Provider, MD   OXYGEN Inhale into the lungs 2.5 L at HS   Yes Historical Provider, MD   ipratropium-albuterol (DUONEB) 0.5-2.5 (3) MG/3ML SOLN nebulizer solution Inhale 3 mLs into the lungs every 4 hours 5/6/21  Yes Hill Ruggiero MD   albuterol sulfate HFA (VENTOLIN HFA) 108 (90 Base) MCG/ACT inhaler Inhale 2 puffs into the lungs 4 times daily as needed for Wheezing 4/13/21  Yes Hill Ruggiero MD   insulin glargine (BASAGLAR KWIKPEN) 100 UNIT/ML injection pen Inject 55 Units into the skin nightly    Yes Historical Provider, MD   dapagliflozin (FARXIGA) 5 MG tablet Take 5 mg by mouth 2 times daily    Yes Historical Provider, MD   amitriptyline (ELAVIL) 25 MG tablet Take 25 mg by mouth nightly   Yes Historical Provider, MD   clopidogrel (PLAVIX) 75 MG tablet Take 1 tablet by mouth daily 2/7/19  Yes Kimberly Solorio MD   tolterodine (DETROL) 2 MG tablet Take 2 mg by mouth 2 times daily   Yes Historical Provider, MD   Dulaglutide (TRULICITY) 1.5 BS/6.4OY SOPN Inject 1.5 mg into the skin    Yes Historical Provider, MD   famotidine (PEPCID) 20 MG tablet Take 20 mg by mouth 2 times daily   Yes Historical Provider, MD   nitroGLYCERIN (NITROSTAT) 0.4 MG SL tablet Place 1 tablet under the tongue every 5 minutes as needed for Chest pain (No more than 3 tablets in a 15 minute period. ). 6/12/14  Yes Kimberly Solorio MD   glucose blood VI test strips (ASCENSIA AUTODISC VI;ONE TOUCH ULTRA TEST VI) strip 1 each by Does not apply route daily. As needed. Yes Historical Provider, MD   metformin (GLUCOPHAGE) 1000 MG tablet Take 1 tablet by mouth 2 times daily (with meals). 10/28/11  Yes Bob Gibbs MD   ferrous sulfate 325 (65 FE) MG tablet Take 325 mg by mouth 2 times daily. Yes Historical Provider, MD   gabapentin (NEURONTIN) 600 MG tablet Take 600 mg by mouth 2 times daily. .   Yes Historical Provider, MD   therapeutic multivitamin-minerals (THERAGRAN-M) tablet Take 2 tablets by mouth daily. Yes Historical Provider, MD        Allergies:  Penicillins     Review of Systems:   · Constitutional: there has been no unanticipated weight loss. There's been no change in energy level, sleep pattern, or activity level. · Eyes: No visual changes or diplopia. No scleral icterus. · ENT: No Headaches, hearing loss or vertigo. No mouth sores or sore throat. · Cardiovascular: Reviewed in HPI  · Respiratory: No cough or wheezing, no sputum production. No hematemesis. · Gastrointestinal: No abdominal pain, appetite loss, blood in stools. No change in bowel or bladder habits. · Genitourinary: No dysuria, trouble voiding, or hematuria.   · Musculoskeletal:  No gait disturbance, weakness or joint complaints. · Integumentary: No rash or pruritis. · Neurological: No headache, diplopia, change in muscle strength, numbness or tingling. No change in gait, balance, coordination, mood, affect, memory, mentation, behavior. · Psychiatric: No anxiety, no depression. · Endocrine: No malaise, fatigue or temperature intolerance. No excessive thirst, fluid intake, or urination. No tremor. · Hematologic/Lymphatic: No abnormal bruising or bleeding, blood clots or swollen lymph nodes. · Allergic/Immunologic: No nasal congestion or hives. Physical Examination:    Vitals:    12/10/21 0807   BP: 120/70   Site: Right Upper Arm   Position: Sitting   Cuff Size: Medium Adult   Pulse: 102   SpO2: 94%   Weight: 154 lb (69.9 kg)   Height: 5' 5\" (1.651 m)        Constitutional and General Appearance: Warm and dry, no apparent distress, normal coloration  HEENT:  Normocephalic, atraumatic  Respiratory:  · Normal excursion and expansion without use of accessory muscles  · Resp Auscultation: bilateral rales in bases  Cardiovascular:  · The apical impulses not displaced  · Heart tones are crisp and normal  · JVP normal cm H2O  · tachy rate and rhythm  · Peripheral pulses are symmetrical and full  · There is no clubbing, cyanosis of the extremities.   · no edema  · Pedal Pulses: 2+ and equal   Abdomen:  · No masses or tenderness  · Liver/Spleen: No Abnormalities Noted  Neurological/Psychiatric:  · Alert and oriented in all spheres  · Moves all extremities well  · Exhibits normal gait balance and coordination  · No abnormalities of mood, affect, memory, mentation, or behavior are noted    Lab Data:    CBC:   Lab Results   Component Value Date    WBC 11.0 10/08/2021    WBC 11.0 09/24/2021    WBC 10.1 08/26/2021    RBC 4.73 10/08/2021    RBC 4.63 09/24/2021    RBC 4.12 08/26/2021    HGB 13.9 10/08/2021    HGB 13.7 09/24/2021    HGB 11.9 08/26/2021    HCT 41.9 10/08/2021    HCT 41.0 09/24/2021    HCT 35.9 08/26/2021    MCV 88.6 10/08/2021    MCV 88.6 09/24/2021    MCV 87.1 08/26/2021    RDW 15.4 10/08/2021    RDW 16.2 09/24/2021    RDW 17.2 08/26/2021     10/08/2021     09/24/2021     08/26/2021     BMP:  Lab Results   Component Value Date     11/03/2021     10/08/2021     09/24/2021    K 4.9 11/03/2021    K 4.0 10/08/2021    K 5.1 09/24/2021    K 3.7 03/04/2021    CL 95 11/03/2021    CL 95 10/08/2021    CL 95 09/24/2021    CO2 27 11/03/2021    CO2 27 10/08/2021    CO2 27 09/24/2021    BUN 44 11/03/2021    BUN 39 10/08/2021    BUN 33 09/24/2021    CREATININE 1.3 11/03/2021    CREATININE 1.3 10/08/2021    CREATININE 1.4 09/24/2021     BNP:   Lab Results   Component Value Date    PROBNP 891 11/03/2021    PROBNP 1,434 09/24/2021    PROBNP 2,108 09/09/2021     Cardiac Imaging:  Echo: 08/23/21   Left ventricular systolic function is reduced with ejection fraction   estimated at 20 %. Akinesis of the inferior, septal and apical septal walls with severe   hypokinesis of the remaining walls. Left ventricular cavity size is mildly dilated with mild concentric left   ventricular hypertrophy. Grade III diastolic dysfunction with elevated filling pressure. TAPSE is measured at 12.9 mm.   S'' prime velocity is measured at 6.2 cm/s. Right ventricular systolic function is moderately reduced . Biatrial enlargement. The aortic root is mildly dilated. Moderate mitral and tricuspid regurgitation. Systolic pulmonary artery pressure (SPAP) estimated at 57 mmHg (right atrial   pressure 8 mmHg), consistent with moderate pulmonary hypertension. Cardiac cath: 04/28/21  Intervention  ~Successful PCI to LAD with 3.0x32 JOANNA. PD with 3.5x15 NC to 22atm. Excellent Result. Contrast: 77  Flouro Time: 17.2  Access: R CFA/CFV.  Perc stick in sfa (high bifurcation)     Impression  ~Coronary Angiography w/ severe single vessel CAD  ~LVG with LVEF of 20 and LAD regional wall motion abnormalities  ~Successful complex angioplasty and stenting of LAD    Assessment:    1. Chronic systolic heart failure (HCC) on arni, bb, digoxin, aldosterone antagonist and farxiga (5 mg daily)   2. Vitamin D deficiency    3. Essential hypertension    4. LBBB (left bundle branch block)    5. CAD S/P percutaneous coronary angioplasty    6. Pulmonary fibrosis (HCC) on 2.5 L of oxygen at night   7. ICD in place      Plan:   Patient Instructions   1. Chest xray today and labs  2. Change farxiga to 10 mg daily  3.  alternate [id number] mg and 50 mg  4. Consider decreasing gabapentin  5. Stop coreg and change to toprol 50 mg daily  6.   Jan 7th at 0800    NYHA IV    I appreciate the opportunity of cooperating in the care of this individual.    Denver Childers, ROLO - CNS, CNS, 12/10/2021, 10:49 AM

## 2021-12-10 NOTE — PROGRESS NOTES
Colette Oneill    YOB: 1940     Date of Service:  12/10/2021     Chief Complaint   Patient presents with    Other     pulm fibrosis    Shortness of Breath         HPI patient has been accompanied by his son to our office today. Most history was given by his son who speaks good [place]. Patient is status post BiV pacer/AICD on 10/8. Also has significant coronary artery disease status post stent. EF 20%, LBBB. Continues to be significantly dyspneic even with minimal exertion-his pretty much housebound lately. No significant cough or phlegm. Increased fatigue noticed by patient's son.     Allergies   Allergen Reactions    Penicillins Itching     Outpatient Medications Marked as Taking for the 12/10/21 encounter (Office Visit) with Natan Llanes MD   Medication Sig Dispense Refill    metoprolol succinate (TOPROL XL) 50 MG extended release tablet Take 1 tablet by mouth daily 30 tablet 3    torsemide (DEMADEX) 100 MG tablet 50 mg alternating 100 mg and as directed 90 tablet 1    atorvastatin (LIPITOR) 40 MG tablet Take 1 tablet by mouth daily 90 tablet 0    spironolactone (ALDACTONE) 25 MG tablet TAKE ONE TABLET BY MOUTH TWICE A DAY (Patient taking differently: Take 12.5 mg by mouth daily ) 180 tablet 0    apixaban (ELIQUIS) 5 MG TABS tablet Take 1 tablet by mouth 2 times daily (Patient taking differently: Take 5 mg by mouth daily ) 180 tablet 1    sacubitril-valsartan (ENTRESTO) 24-26 MG per tablet Taking 1/2 tablet twice a day 90 tablet 3    digoxin (LANOXIN) 125 MCG tablet Take 1 tablet by mouth See Admin Instructions 1 tablet M,W,F, Sun (Patient taking differently: Take 125 mcg by mouth See Admin Instructions 1 tablet every other day) 30 tablet 3    VITAMIN D PO Take by mouth      Nebulizers MISC by Does not apply route      OXYGEN Inhale into the lungs 2.5 L at HS      ipratropium-albuterol (DUONEB) 0.5-2.5 (3) MG/3ML SOLN nebulizer solution Inhale 3 mLs into the lungs every 4 hours 360 mL 11    albuterol sulfate HFA (VENTOLIN HFA) 108 (90 Base) MCG/ACT inhaler Inhale 2 puffs into the lungs 4 times daily as needed for Wheezing 1 Inhaler 5    insulin glargine (BASAGLAR KWIKPEN) 100 UNIT/ML injection pen Inject 55 Units into the skin nightly       dapagliflozin (FARXIGA) 5 MG tablet Take 5 mg by mouth 2 times daily       amitriptyline (ELAVIL) 25 MG tablet Take 25 mg by mouth nightly      clopidogrel (PLAVIX) 75 MG tablet Take 1 tablet by mouth daily 90 tablet 3    tolterodine (DETROL) 2 MG tablet Take 2 mg by mouth 2 times daily      Dulaglutide (TRULICITY) 1.5 QJ/5.5VI SOPN Inject 1.5 mg into the skin       famotidine (PEPCID) 20 MG tablet Take 20 mg by mouth 2 times daily      nitroGLYCERIN (NITROSTAT) 0.4 MG SL tablet Place 1 tablet under the tongue every 5 minutes as needed for Chest pain (No more than 3 tablets in a 15 minute period. ). 25 tablet 2    glucose blood VI test strips (ASCENSIA AUTODISC VI;ONE TOUCH ULTRA TEST VI) strip 1 each by Does not apply route daily. As needed.  metformin (GLUCOPHAGE) 1000 MG tablet Take 1 tablet by mouth 2 times daily (with meals). 90 tablet 3    ferrous sulfate 325 (65 FE) MG tablet Take 325 mg by mouth 2 times daily.  gabapentin (NEURONTIN) 600 MG tablet Take 600 mg by mouth 2 times daily. Daniella Lety therapeutic multivitamin-minerals (THERAGRAN-M) tablet Take 2 tablets by mouth daily.          Immunization History   Administered Date(s) Administered    COVID-19, Yoo Peter, PF, 30mcg/0.3mL 02/25/2021, 03/18/2021, 09/27/2021    Influenza Virus Vaccine 10/26/2011    Pneumococcal Polysaccharide ([id number]) 10/26/2011       Past Medical History:   Diagnosis Date    Acute MI ([facility] [address]     CAD (coronary artery disease)     CHF (congestive heart failure), NYHA class III ([facility] [address] [facility]     Diabetes mellitus ([facility] [address] [facility]     HTN (hypertension) 10/26/2011    Hyperlipidemia 10/26/2011     Past Surgical History:   Procedure Laterality Date  CARDIAC SURGERY      stent  x1   2008    CAROTID ENDARTERECTOMY Left 11/02/2016    LEFT CAROTID ENDARTERECTOMY WITH PATCH ANGIOPLASTY    CORONARY ANGIOPLASTY WITH STENT PLACEMENT  05/31/2018    EYE SURGERY Bilateral     cataracts w/lens     Family History   Problem Relation Age of Onset    Heart Attack Brother     Heart Disease Brother     Heart Attack Brother        Review of Systems:  Review of Systems   Constitutional: Positive for appetite change and fatigue. Negative for activity change and fever. HENT: Negative for congestion, ear discharge, ear pain, postnasal drip, rhinorrhea, sinus pressure, sneezing, sore throat, tinnitus and voice change. Respiratory: Positive for shortness of breath. Negative for apnea, cough, choking, chest tightness, wheezing and stridor. Cardiovascular: Positive for leg swelling. Negative for chest pain and palpitations. Gastrointestinal: Negative for abdominal distention, abdominal pain, anal bleeding, blood in stool, constipation and diarrhea. Musculoskeletal: Negative for arthralgias, back pain and gait problem. Skin: Negative for pallor and rash. Allergic/Immunologic: Negative for environmental allergies. Neurological: Negative for dizziness, tremors, seizures, syncope, speech difficulty, weakness, light-headedness, numbness and headaches. Hematological: Negative for adenopathy. Does not bruise/bleed easily. Psychiatric/Behavioral: Negative for sleep disturbance. Vitals:    12/10/21 0927   BP: 120/70   Pulse: 107   SpO2: 94%   Weight: 154 lb (69.9 kg)   Height: 5' 5\" (1.651 m)     No flowsheet data found. Body mass index is 25.63 kg/m². Wt Readings from Last 3 Encounters:   12/10/21 154 lb (69.9 kg)   12/10/21 154 lb (69.9 kg)   11/18/21 156 lb 9.6 oz (71 kg)     BP Readings from Last 3 Encounters:   12/10/21 120/70   12/10/21 120/70   11/18/21 126/74         Physical Exam  Constitutional:       General: He is not in acute distress. future to evaluate for any improvement in LV function. Patient son does realize that overall prognosis is poor and would want his dad to be in familiar surroundings-plans for [facility] back [facility]. Patient does have IPF related features on prior CT imaging from April. Rheumatologic/autoimmune work-up has been noted to be negative. PFT from April also suggested significant restriction with FVC of 1.6 L [52% predicted], TLC of 54% predicted and DLCO 46% predicted. Patient is not a good candidate for antifibrotic therapy-nintedanib/Ofev, given the side effect profile and current decline in overall performance status. Long discussion with patient's son who agrees that we should hold off on therapy. Also briefly addressed goals of care with son-it would not be in patient's interest for use of ventilator or cardiac resuscitation. I would see patient only as needed. I have asked the patient's son to contact me directly if necessary. No follow-ups on file.

## 2021-12-11 LAB
ESTIMATED AVERAGE GLUCOSE: 240.3 MG/DL
HBA1C MFR BLD: 10 %

## 2021-12-13 ENCOUNTER — APPOINTMENT (OUTPATIENT)
Dept: CARDIAC REHAB | Age: 81
End: 2021-12-13
Payer: COMMERCIAL

## 2021-12-22 ENCOUNTER — HOSPITAL ENCOUNTER (OUTPATIENT)
Dept: CARDIAC REHAB | Age: 81
Setting detail: THERAPIES SERIES
Discharge: HOME | End: 2021-12-22
Payer: COMMERCIAL

## 2021-12-22 PROCEDURE — 93798 PHYS/QHP OP CAR RHAB W/ECG: CPT

## 2021-12-27 ENCOUNTER — HOSPITAL ENCOUNTER (OUTPATIENT)
Dept: CARDIAC REHAB | Age: 81
Setting detail: THERAPIES SERIES
Discharge: HOME | End: 2021-12-27
Payer: COMMERCIAL

## 2021-12-27 PROCEDURE — 93798 PHYS/QHP OP CAR RHAB W/ECG: CPT

## 2021-12-29 ENCOUNTER — APPOINTMENT (OUTPATIENT)
Dept: CARDIAC REHAB | Age: 81
End: 2021-12-29
Payer: COMMERCIAL

## 2022-01-05 ENCOUNTER — HOSPITAL ENCOUNTER (OUTPATIENT)
Age: 82
Discharge: HOME | End: 2022-01-05
Payer: COMMERCIAL

## 2022-01-05 DIAGNOSIS — I50.22 CHRONIC SYSTOLIC HEART FAILURE (HCC): ICD-10-CM

## 2022-01-05 LAB
ANION GAP SERPL CALCULATED.3IONS-SCNC: 16 MMOL/L (ref 3–16)
BUN BLDV-MCNC: 51 MG/DL (ref 7–20)
CALCIUM SERPL-MCNC: 10.4 MG/DL (ref 8.3–10.6)
CHLORIDE BLD-SCNC: 97 MMOL/L (ref 99–110)
CO2: 27 MMOL/L (ref 21–32)
CREAT SERPL-MCNC: 1.6 MG/DL (ref 0.8–1.3)
GFR AFRICAN AMERICAN: 50
GFR NON-AFRICAN AMERICAN: 42
GLUCOSE BLD-MCNC: 215 MG/DL (ref 70–99)
POTASSIUM SERPL-SCNC: 5.2 MMOL/L (ref 3.5–5.1)
PRO-BNP: 1593 PG/ML (ref 0–449)
SODIUM BLD-SCNC: 140 MMOL/L (ref 136–145)

## 2022-01-05 PROCEDURE — 80048 BASIC METABOLIC PNL TOTAL CA: CPT

## 2022-01-05 PROCEDURE — 83880 ASSAY OF NATRIURETIC PEPTIDE: CPT

## 2022-01-05 PROCEDURE — 36415 COLL VENOUS BLD VENIPUNCTURE: CPT

## 2022-01-07 ENCOUNTER — NURSE ONLY (OUTPATIENT)
Dept: CARDIOLOGY CLINIC | Age: 82
End: 2022-01-07

## 2022-01-07 ENCOUNTER — HOSPITAL ENCOUNTER (OUTPATIENT)
Dept: CARDIAC REHAB | Age: 82
Setting detail: THERAPIES SERIES
Discharge: HOME | End: 2022-01-07

## 2022-01-07 ENCOUNTER — OFFICE VISIT (OUTPATIENT)
Dept: CARDIOLOGY CLINIC | Age: 82
End: 2022-01-07
Payer: COMMERCIAL

## 2022-01-07 VITALS
HEIGHT: 65 IN | WEIGHT: 151 LBS | SYSTOLIC BLOOD PRESSURE: 96 MMHG | HEART RATE: 93 BPM | OXYGEN SATURATION: 95 % | BODY MASS INDEX: 25.16 KG/M2 | DIASTOLIC BLOOD PRESSURE: 60 MMHG

## 2022-01-07 DIAGNOSIS — E55.9 VITAMIN D DEFICIENCY: ICD-10-CM

## 2022-01-07 DIAGNOSIS — I10 ESSENTIAL HYPERTENSION: ICD-10-CM

## 2022-01-07 DIAGNOSIS — Z98.61 CAD S/P PERCUTANEOUS CORONARY ANGIOPLASTY: ICD-10-CM

## 2022-01-07 DIAGNOSIS — I25.10 CAD S/P PERCUTANEOUS CORONARY ANGIOPLASTY: ICD-10-CM

## 2022-01-07 DIAGNOSIS — Z95.810 ICD (IMPLANTABLE CARDIOVERTER-DEFIBRILLATOR) IN PLACE: ICD-10-CM

## 2022-01-07 DIAGNOSIS — I50.22 CHRONIC SYSTOLIC (CONGESTIVE) HEART FAILURE (HCC): ICD-10-CM

## 2022-01-07 DIAGNOSIS — I50.22 CHRONIC SYSTOLIC HEART FAILURE (HCC): Primary | ICD-10-CM

## 2022-01-07 DIAGNOSIS — I50.22 CHRONIC SYSTOLIC HEART FAILURE (HCC): ICD-10-CM

## 2022-01-07 DIAGNOSIS — I44.7 LBBB (LEFT BUNDLE BRANCH BLOCK): ICD-10-CM

## 2022-01-07 DIAGNOSIS — I25.5 ISCHEMIC CARDIOMYOPATHY: ICD-10-CM

## 2022-01-07 DIAGNOSIS — Z95.810 ICD (IMPLANTABLE CARDIOVERTER-DEFIBRILLATOR), BIVENTRICULAR, IN SITU: ICD-10-CM

## 2022-01-07 LAB
T4 FREE: 1.1 NG/DL (ref 0.9–1.8)
TSH REFLEX: 6.57 UIU/ML (ref 0.27–4.2)

## 2022-01-07 PROCEDURE — 99214 OFFICE O/P EST MOD 30 MIN: CPT | Performed by: CLINICAL NURSE SPECIALIST

## 2022-01-07 PROCEDURE — 93290 INTERROG DEV EVAL ICPMS IP: CPT | Performed by: CLINICAL NURSE SPECIALIST

## 2022-01-07 RX ORDER — METOPROLOL SUCCINATE 25 MG/1
75 TABLET, EXTENDED RELEASE ORAL DAILY
Qty: 90 TABLET | Refills: 1 | Status: SHIPPED | OUTPATIENT
Start: 2022-01-07 | End: 2022-01-07 | Stop reason: SDUPTHER

## 2022-01-07 RX ORDER — METOPROLOL SUCCINATE 25 MG/1
75 TABLET, EXTENDED RELEASE ORAL DAILY
Qty: 270 TABLET | Refills: 1 | Status: SHIPPED | OUTPATIENT
Start: 2022-01-07 | End: 2022-02-11

## 2022-01-07 RX ORDER — TORSEMIDE 100 MG/1
TABLET ORAL
Qty: 90 TABLET | Refills: 1 | Status: SHIPPED | OUTPATIENT
Start: 2022-01-07 | End: 2022-02-11 | Stop reason: SDUPTHER

## 2022-01-07 NOTE — PROGRESS NOTES
[facility]  Progress Note    Primary Care Doctor:  Laurent Dorman MD    Chief Complaint   Patient presents with    1 Month Follow-Up    Congestive Heart Failure        History of Present Illness:  80 y.o. male with history of CAD status post PTCA of circumflex/OM1 with JOANNA, pulmonary fibrosis 3/2021, hypertension, hyperlipidemia. LVEF 20% 4/21. [facility] 4/20/21 successful PCI to LAD with JOANNA. Echo still with LVEF of 20% and moderate PHTN with 57mmHg. ICD placed 10/8/2021  [place] [facility] nationality      I had the pleasure of seeing Vianey Jon in follow up for systolic heart failure. He is in a wheelchair with his son, Sonny Chowdary. No interpretor. His optival shows normal impedence. His weight is staying 150-151 at home. Recent labs with increased potassium. He is tolerating all his medications. HR better after switch to toprol. Cardiac rehab is not helping and he justs like to sit and mess with his ipad. He saw pulmonary and declined treatment for his pulmonary fibrosis. He is going back to [place] soon with his other son for 6 months. He continues to have shortness of breath, no palpitations, edema. He is eating well but loves to drink fluids. Past Medical History:   has a past medical history of Acute MI ([address] CAD (coronary artery disease), CHF (congestive heart failure), NYHA class III ([address] Diabetes mellitus ([address] HTN (hypertension), and Hyperlipidemia. Surgical History:   has a past surgical history that includes Cardiac surgery; eye surgery (Bilateral); Carotid endarterectomy (Left, 11/02/2016); and Coronary angioplasty with stent (05/31/2018). Social History:   reports that he has never smoked. He has never used smokeless tobacco. He reports that he does not drink alcohol and does not use drugs.    Family History:   Family History   Problem Relation Age of Onset    Heart Attack Brother     Heart Disease Brother     Heart Attack Brother        Home Medications:  Prior to Admission medications    Medication Sig Start Date End Date Taking?  Authorizing Provider   dapagliflozin (FARXIGA) 5 MG tablet Take 2 tablets by mouth every morning 1/7/22  Yes ROLO Humphries   torsemide (DEMADEX) 100 MG tablet 50 mg alternating 100 mg and as directed 1/7/22  Yes ROLO Humphries   metoprolol succinate (TOPROL XL) 25 MG extended release tablet Take 3 tablets by mouth daily 1/7/22  Yes ROLO Humphries   atorvastatin (LIPITOR) 40 MG tablet Take 1 tablet by mouth daily 12/10/21  Yes ROLO Bolaños   spironolactone (ALDACTONE) 25 MG tablet TAKE ONE TABLET BY MOUTH TWICE A DAY  Patient taking differently: Take 12.5 mg by mouth daily  11/17/21  Yes ROLO Bolaños   apixaban (ELIQUIS) 5 MG TABS tablet Take 1 tablet by mouth 2 times daily  Patient taking differently: Take 5 mg by mouth daily  11/11/21  Yes ROLO Nicholas CNP   sacubitril-valsartan (ENTRESTO) 24-26 MG per tablet Taking 1/2 tablet twice a day 10/22/21  Yes ROLO Norton CNP   digoxin (LANOXIN) 125 MCG tablet Take 1 tablet by mouth See Admin Instructions 1 tablet M,W,F, Sun  Patient taking differently: Take 125 mcg by mouth See Admin Instructions 1 tablet every other day 8/26/21  Yes Otilia Robertson MD   VITAMIN D PO Take by mouth   Yes Historical Provider, MD   Nebulizers MISC by Does not apply route   Yes Historical Provider, MD   OXYGEN Inhale into the lungs 2.5 L at HS   Yes Historical Provider, MD   ipratropium-albuterol (DUONEB) 0.5-2.5 (3) MG/3ML SOLN nebulizer solution Inhale 3 mLs into the lungs every 4 hours 5/6/21  Yes Porfirio Chang MD   albuterol sulfate HFA (VENTOLIN HFA) 108 (90 Base) MCG/ACT inhaler Inhale 2 puffs into the lungs 4 times daily as needed for Wheezing 4/13/21  Yes Porfirio Chang MD   insulin glargine (BASAGLAR KWIKPEN) 100 UNIT/ML injection pen Inject 55 Units into the skin nightly    Yes Historical Provider, MD   amitriptyline (ELAVIL) 25 MG tablet Take 25 mg by mouth nightly   Yes Historical Provider, MD   clopidogrel (PLAVIX) 75 MG tablet Take 1 tablet by mouth daily 2/7/19  Yes Elvis Adame MD   tolterodine (DETROL) 2 MG tablet Take 2 mg by mouth 2 times daily   Yes Historical Provider, MD   Dulaglutide (TRULICITY) 1.5 KA/1.5WN SOPN Inject 1.5 mg into the skin    Yes Historical Provider, MD   famotidine (PEPCID) 20 MG tablet Take 20 mg by mouth 2 times daily   Yes Historical Provider, MD   nitroGLYCERIN (NITROSTAT) 0.4 MG SL tablet Place 1 tablet under the tongue every 5 minutes as needed for Chest pain (No more than 3 tablets in a 15 minute period. ). 6/12/14  Yes Elvis Adame MD   glucose blood VI test strips (ASCENSIA AUTODISC VI;ONE TOUCH ULTRA TEST VI) strip 1 each by Does not apply route daily. As needed. Yes Historical Provider, MD   metformin (GLUCOPHAGE) 1000 MG tablet Take 1 tablet by mouth 2 times daily (with meals). 10/28/11  Yes Salty Marquez MD   ferrous sulfate 325 (65 FE) MG tablet Take 325 mg by mouth 2 times daily. Yes Historical Provider, MD   gabapentin (NEURONTIN) 600 MG tablet Take 600 mg by mouth 2 times daily. .   Yes Historical Provider, MD   therapeutic multivitamin-minerals (THERAGRAN-M) tablet Take 2 tablets by mouth daily. Yes Historical Provider, MD        Allergies:  Penicillins     Review of Systems:   · Constitutional: there has been no unanticipated weight loss. There's been no change in energy level, sleep pattern, or activity level. · Eyes: No visual changes or diplopia. No scleral icterus. · ENT: No Headaches, hearing loss or vertigo. No mouth sores or sore throat. · Cardiovascular: Reviewed in HPI  · Respiratory: No cough or wheezing, no sputum production. No hematemesis. · Gastrointestinal: No abdominal pain, appetite loss, blood in stools. No change in bowel or bladder habits.   · Genitourinary: No dysuria, trouble voiding, or hematuria. · Musculoskeletal:  No gait disturbance, weakness or joint complaints. · Integumentary: No rash or pruritis. · Neurological: No headache, diplopia, change in muscle strength, numbness or tingling. No change in gait, balance, coordination, mood, affect, memory, mentation, behavior. · Psychiatric: No anxiety, no depression. · Endocrine: No malaise, fatigue or temperature intolerance. No excessive thirst, fluid intake, or urination. No tremor. · Hematologic/Lymphatic: No abnormal bruising or bleeding, blood clots or swollen lymph nodes. · Allergic/Immunologic: No nasal congestion or hives. Physical Examination:    Vitals:    01/07/22 0826   BP: 96/60   Site: Left Upper Arm   Position: Sitting   Cuff Size: Medium Adult   Pulse: 93   SpO2: 95%   Weight: 151 lb (68.5 kg)   Height: 5' 5\" (1.651 m)        Constitutional and General Appearance: Warm and dry, no apparent distress, normal coloration  HEENT:  Normocephalic, atraumatic  Respiratory:  · Normal excursion and expansion without use of accessory muscles  · Resp Auscultation: no rhonchi, rales or wheezing  Cardiovascular:  · The apical impulses not displaced  · Heart tones are crisp and normal  · JVP normal cm H2O  · Regular rate and rhythm  · Peripheral pulses are symmetrical and full  · There is no clubbing, cyanosis of the extremities.   · no edema  · Pedal Pulses: 2+ and equal   Abdomen:  · No masses or tenderness  · Liver/Spleen: No Abnormalities Noted  Neurological/Psychiatric:  · Alert and oriented in all spheres  · Moves all extremities well  · Exhibits normal gait balance and coordination  · No abnormalities of mood, affect, memory, mentation, or behavior are noted    Lab Data:    CBC:   Lab Results   Component Value Date    WBC 11.3 12/10/2021    WBC 11.0 10/08/2021    WBC 11.0 09/24/2021    RBC 4.56 12/10/2021    RBC 4.73 10/08/2021    RBC 4.63 09/24/2021    HGB 13.3 12/10/2021    HGB 13.9 10/08/2021    HGB 13.7 09/24/2021    HCT 40.1 12/10/2021    HCT 41.9 10/08/2021    HCT 41.0 09/24/2021    MCV 87.9 12/10/2021    MCV 88.6 10/08/2021    MCV 88.6 09/24/2021    RDW 14.2 12/10/2021    RDW 15.4 10/08/2021    RDW 16.2 09/24/2021     12/10/2021     10/08/2021     09/24/2021     BMP:  Lab Results   Component Value Date     01/05/2022     12/10/2021     11/03/2021    K 5.2 01/05/2022    K 4.4 12/10/2021    K 4.9 11/03/2021    K 3.7 03/04/2021    CL 97 01/05/2022    CL 95 12/10/2021    CL 95 11/03/2021    CO2 27 01/05/2022    CO2 26 12/10/2021    CO2 27 11/03/2021    BUN 51 01/05/2022    BUN 51 12/10/2021    BUN 44 11/03/2021    CREATININE 1.6 01/05/2022    CREATININE 1.4 12/10/2021    CREATININE 1.3 11/03/2021     BNP:   Lab Results   Component Value Date    PROBNP 1,593 01/05/2022    PROBNP 1,316 12/10/2021    PROBNP 891 11/03/2021     Cardiac Imaging:  Echo: 08/23/21   Left ventricular systolic function is reduced with ejection fraction   estimated at 20 %. Akinesis of the inferior, septal and apical septal walls with severe   hypokinesis of the remaining walls. Left ventricular cavity size is mildly dilated with mild concentric left   ventricular hypertrophy. Grade III diastolic dysfunction with elevated filling pressure. TAPSE is measured at 12.9 mm.   S'' prime velocity is measured at 6.2 cm/s. Right ventricular systolic function is moderately reduced . Biatrial enlargement. The aortic root is mildly dilated. Moderate mitral and tricuspid regurgitation. Systolic pulmonary artery pressure (SPAP) estimated at 57 mmHg (right atrial   pressure 8 mmHg), consistent with moderate pulmonary hypertension. Cardiac cath: 04/28/21  Intervention  ~Successful PCI to LAD with 3.0x32 JOANNA. PD with 3.5x15 NC to 22atm. Excellent Result. Contrast: 77  Flouro Time: 17.2  Access: R CFA/CFV.  Perc stick in sfa (high bifurcation)     Impression  ~Coronary Angiography w/ severe single vessel CAD  ~LVG with LVEF of 20 and LAD regional wall motion abnormalities  ~Successful complex angioplasty and stenting of LAD    Assessment:    1. Chronic systolic heart failure (HCC) on arni, bb, digoxin, aldosterone antagonist and farxiga    2. Vitamin D deficiency    3. Essential hypertension    4. LBBB (left bundle branch block)    5. CAD S/P percutaneous coronary angioplasty    6. Pulmonary fibrosis (HCC) on 2.5 L of oxygen at night   7. ICD in place      Plan:   Patient Instructions   1. Check echo  2. Continue torsemide 50 mg alternating with a 100; if weight >155 then increase to 100 mg daily until weight back down  3. Change aldactone to 25 mg six days of the week  4. Increase metoprolol to 75 mg daily  5. Add tsh and free t4 to labs done 1/5  6.   RTO in 4 weeks    NYHA IV    I appreciate the opportunity of cooperating in the care of this individual.    ROLO Rosales - CNS, CNS, 1/7/2022, 9:16 AM

## 2022-01-07 NOTE — PROGRESS NOTES
[address] transmission received 01.07.22 @ 9:34am from [facility] for patient's CRT-D.    REASON FOR TRANSMISSION  Presence of cardiac device  TECHNICAL FINDINGS  Potential device or lead performance issue(s) observed  ADDITIONAL NOTES  Patient Name: Cintia Allred   DEVICE ASSESSMENT: Possible high LV threshold on 07-Jan-2022. LV Capture Management determined that threshold increased by 1.625 V from 10-Nov-2021 to 14-Nov-2021. This increase was greater than Amplitude Safety Margin (+0.5 V) and may have compromised capture. LV threshold last measured on 14-NOV-2021. All other available daily battery/lead measurements within expected range. Lead trends stable. 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 See attached episode list & stored EGM's for details. OBSERVATIONS: Device appears to be currently functioning as programmed. *No call to account with verbal report requested, fax only. Pt on Eliquis, Toprol XL. Optivol is within normal range. NP will review. See interrogation under cardiology tab in the [address] [place] [address] [address] field for more details.

## 2022-01-07 NOTE — PATIENT INSTRUCTIONS
1. Check echo  2. Continue torsemide 50 mg alternating with a 100; if weight >155 then increase to 100 mg daily until weight back down  3. Change aldactone to 25 mg six days of the week  4. Increase metoprolol to 75 mg daily  5. Add tsh and free t4 to labs done 1/5  6.   RTO in 4 weeks

## 2022-01-24 ENCOUNTER — PROCEDURE VISIT (OUTPATIENT)
Dept: CARDIOLOGY CLINIC | Age: 82
End: 2022-01-24
Payer: COMMERCIAL

## 2022-01-24 DIAGNOSIS — I50.22 CHRONIC SYSTOLIC HEART FAILURE (HCC): ICD-10-CM

## 2022-01-24 LAB
LV EF: 20 %
LVEF MODALITY: NORMAL

## 2022-01-24 PROCEDURE — 93306 TTE W/DOPPLER COMPLETE: CPT | Performed by: INTERNAL MEDICINE

## 2022-02-10 ENCOUNTER — NURSE ONLY (OUTPATIENT)
Dept: CARDIOLOGY CLINIC | Age: 82
End: 2022-02-10
Payer: COMMERCIAL

## 2022-02-10 ENCOUNTER — HOSPITAL ENCOUNTER (OUTPATIENT)
Age: 82
Discharge: HOME | End: 2022-02-10
Payer: COMMERCIAL

## 2022-02-10 ENCOUNTER — TELEPHONE (OUTPATIENT)
Dept: CARDIOLOGY CLINIC | Age: 82
End: 2022-02-10

## 2022-02-10 DIAGNOSIS — I50.32 CHRONIC DIASTOLIC CONGESTIVE HEART FAILURE (HCC): ICD-10-CM

## 2022-02-10 DIAGNOSIS — I50.22 CHRONIC SYSTOLIC HEART FAILURE (HCC): ICD-10-CM

## 2022-02-10 DIAGNOSIS — Z95.810 ICD (IMPLANTABLE CARDIOVERTER-DEFIBRILLATOR), BIVENTRICULAR, IN SITU: ICD-10-CM

## 2022-02-10 DIAGNOSIS — I25.5 ISCHEMIC CARDIOMYOPATHY: ICD-10-CM

## 2022-02-10 LAB
ANION GAP SERPL CALCULATED.3IONS-SCNC: 19 MMOL/L (ref 3–16)
BUN BLDV-MCNC: 38 MG/DL (ref 7–20)
CALCIUM SERPL-MCNC: 9.8 MG/DL (ref 8.3–10.6)
CHLORIDE BLD-SCNC: 94 MMOL/L (ref 99–110)
CO2: 24 MMOL/L (ref 21–32)
CREAT SERPL-MCNC: 1.5 MG/DL (ref 0.8–1.3)
GFR AFRICAN AMERICAN: 54
GFR NON-AFRICAN AMERICAN: 45
GLUCOSE BLD-MCNC: 255 MG/DL (ref 70–99)
POTASSIUM SERPL-SCNC: 4.4 MMOL/L (ref 3.5–5.1)
PRO-BNP: 1802 PG/ML (ref 0–449)
SODIUM BLD-SCNC: 137 MMOL/L (ref 136–145)
T4 FREE: 1.1 NG/DL (ref 0.9–1.8)
TSH SERPL DL<=0.05 MIU/L-ACNC: 4.37 UIU/ML (ref 0.27–4.2)

## 2022-02-10 PROCEDURE — 83880 ASSAY OF NATRIURETIC PEPTIDE: CPT

## 2022-02-10 PROCEDURE — 93295 DEV INTERROG REMOTE 1/2/MLT: CPT | Performed by: INTERNAL MEDICINE

## 2022-02-10 PROCEDURE — 83036 HEMOGLOBIN GLYCOSYLATED A1C: CPT

## 2022-02-10 PROCEDURE — 36415 COLL VENOUS BLD VENIPUNCTURE: CPT

## 2022-02-10 PROCEDURE — 84443 ASSAY THYROID STIM HORMONE: CPT

## 2022-02-10 PROCEDURE — 84439 ASSAY OF FREE THYROXINE: CPT

## 2022-02-10 PROCEDURE — 93296 REM INTERROG EVL PM/IDS: CPT | Performed by: INTERNAL MEDICINE

## 2022-02-10 PROCEDURE — 80048 BASIC METABOLIC PNL TOTAL CA: CPT

## 2022-02-10 NOTE — TELEPHONE ENCOUNTER
Please call Patient and scheduled him for an in office Device check to do in office manual testing. If Patient is coming in tomorrow to see [address] [place] [address] I will have [employer] come out to do the check. Otherwise, please schedule Patient for a device check at his earliest convenience.  Thanks

## 2022-02-10 NOTE — PROGRESS NOTES
We received remote transmission from patient's monitor at home. Transmission shows normal sensing and pacing function. Patient's effective bi-v pacing is low. This has changed since last check. Will notify office staff. EP physician will review. See interrogation under cardiology tab in the [address] [place] [address] [address] field for more details. Optivol is within normal range.

## 2022-02-10 NOTE — Clinical Note
Patients bi-v pacing has drastically changed since last check in November. He may need an OV. Thanks.

## 2022-02-11 ENCOUNTER — OFFICE VISIT (OUTPATIENT)
Dept: CARDIOLOGY CLINIC | Age: 82
End: 2022-02-11
Payer: COMMERCIAL

## 2022-02-11 ENCOUNTER — NURSE ONLY (OUTPATIENT)
Dept: CARDIOLOGY CLINIC | Age: 82
End: 2022-02-11
Payer: COMMERCIAL

## 2022-02-11 VITALS
SYSTOLIC BLOOD PRESSURE: 90 MMHG | HEIGHT: 65 IN | BODY MASS INDEX: 24.49 KG/M2 | HEART RATE: 100 BPM | DIASTOLIC BLOOD PRESSURE: 50 MMHG | WEIGHT: 147 LBS | OXYGEN SATURATION: 93 %

## 2022-02-11 DIAGNOSIS — I44.7 LBBB (LEFT BUNDLE BRANCH BLOCK): ICD-10-CM

## 2022-02-11 DIAGNOSIS — I25.10 CAD S/P PERCUTANEOUS CORONARY ANGIOPLASTY: ICD-10-CM

## 2022-02-11 DIAGNOSIS — I50.22 CHRONIC SYSTOLIC HEART FAILURE (HCC): ICD-10-CM

## 2022-02-11 DIAGNOSIS — I10 ESSENTIAL HYPERTENSION: ICD-10-CM

## 2022-02-11 DIAGNOSIS — Z98.61 CAD S/P PERCUTANEOUS CORONARY ANGIOPLASTY: ICD-10-CM

## 2022-02-11 DIAGNOSIS — E55.9 VITAMIN D DEFICIENCY: Primary | ICD-10-CM

## 2022-02-11 DIAGNOSIS — I25.5 ISCHEMIC CARDIOMYOPATHY: ICD-10-CM

## 2022-02-11 DIAGNOSIS — Z95.810 ICD (IMPLANTABLE CARDIOVERTER-DEFIBRILLATOR), BIVENTRICULAR, IN SITU: ICD-10-CM

## 2022-02-11 DIAGNOSIS — I50.32 CHRONIC DIASTOLIC CONGESTIVE HEART FAILURE, NYHA CLASS 3 (HCC): ICD-10-CM

## 2022-02-11 LAB
ESTIMATED AVERAGE GLUCOSE: 188.6 MG/DL
HBA1C MFR BLD: 8.2 %

## 2022-02-11 PROCEDURE — 99214 OFFICE O/P EST MOD 30 MIN: CPT | Performed by: CLINICAL NURSE SPECIALIST

## 2022-02-11 RX ORDER — DIGOXIN 125 MCG
125 TABLET ORAL SEE ADMIN INSTRUCTIONS
Qty: 60 TABLET | Refills: 1 | Status: SHIPPED | OUTPATIENT
Start: 2022-02-11 | End: 2022-02-28 | Stop reason: SDUPTHER

## 2022-02-11 RX ORDER — ATORVASTATIN CALCIUM 40 MG/1
40 TABLET, FILM COATED ORAL DAILY
Qty: 90 TABLET | Refills: 1 | Status: SHIPPED | OUTPATIENT
Start: 2022-02-11

## 2022-02-11 RX ORDER — SPIRONOLACTONE 25 MG/1
25 TABLET ORAL DAILY
Qty: 90 TABLET | Refills: 1
Start: 2022-02-11

## 2022-02-11 RX ORDER — SACUBITRIL AND VALSARTAN 24; 26 MG/1; MG/1
TABLET, FILM COATED ORAL
Qty: 90 TABLET | Refills: 1 | Status: SHIPPED | OUTPATIENT
Start: 2022-02-11

## 2022-02-11 RX ORDER — LEVOTHYROXINE SODIUM 0.03 MG/1
TABLET ORAL
COMMUNITY
Start: 2022-01-12

## 2022-02-11 RX ORDER — METOPROLOL SUCCINATE 100 MG/1
100 TABLET, EXTENDED RELEASE ORAL DAILY
Qty: 90 TABLET | Refills: 1 | Status: SHIPPED | OUTPATIENT
Start: 2022-02-11 | End: 2022-08-11

## 2022-02-11 RX ORDER — TORSEMIDE 100 MG/1
TABLET ORAL
Qty: 90 TABLET | Refills: 1 | Status: SHIPPED | OUTPATIENT
Start: 2022-02-11 | End: 2022-08-11

## 2022-02-11 NOTE — PROGRESS NOTES
[facility]  Progress Note    Primary Care Doctor:  Ambika Medrano MD    Chief Complaint   Patient presents with    Follow-up        History of Present Illness:  80 y.o. male with history of CAD status post PTCA of circumflex/OM1 with JOANNA, pulmonary fibrosis 3/2021, hypertension, hyperlipidemia. LVEF 20% 4/21. [facility] 4/20/21 successful PCI to LAD with JOANNA. Echo still with LVEF of 20% and moderate PHTN with 57mmHg. ICD placed 10/8/2021  Hayes Meyers ([facility]) nationality-      I had the pleasure of seeing Stacie Cuba in follow up for systolic heart failure. He is in a wheelchair with his son, Francine Inman. No interpretor. His optival is normal.  His LV lead is dislodged and needs adjusted per Dr Edward Cid. , sats He is doing well, weight staying 147 sats 91-96% oxygen prn, BP /60 and HR 80-90. His echo shows no improvement in LVEF of 20% but PASP 57->41. He is not having any chest pain, increased shortness of breath or palpitations. Past Medical History:   has a past medical history of Acute MI ([facility] [address] CAD (coronary artery disease), CHF (congestive heart failure), NYHA class III ([address] Diabetes mellitus ([address] HTN (hypertension), and Hyperlipidemia. Surgical History:   has a past surgical history that includes Cardiac surgery; eye surgery (Bilateral); Carotid endarterectomy (Left, 11/02/2016); and Coronary angioplasty with stent (05/31/2018). Social History:   reports that he has never smoked. He has never used smokeless tobacco. He reports that he does not drink alcohol and does not use drugs. Family History:   Family History   Problem Relation Age of Onset    Heart Attack Brother     Heart Disease Brother     Heart Attack Brother        Home Medications:  Prior to Admission medications    Medication Sig Start Date End Date Taking?  Authorizing Provider   digoxin (LANOXIN) 125 MCG tablet Take 1 tablet by mouth See Admin Instructions 125 mcg every day except mon and fri 2/11/22  Yes Pita Armstrong APRN - CNS   spironolactone (ALDACTONE) 25 MG tablet Take 1 tablet by mouth daily 2/11/22  Yes ROLO Herring - TAWANNA   metoprolol succinate (TOPROL XL) 100 MG extended release tablet Take 1 tablet by mouth daily 2/11/22  Yes ROLO Iraheta - CNS   dapagliflozin (FARXIGA) 10 MG tablet Take 1 tablet by mouth every morning 2/11/22  Yes ROLO Antoine - CNS   torsemide (DEMADEX) 100 MG tablet 50 mg alternating 100 mg and as directed 2/11/22  Yes ROLO Iraheta - CNS   atorvastatin (LIPITOR) 40 MG tablet Take 1 tablet by mouth daily 2/11/22  Yes ROLO Iraheta   sacubitril-valsartan (ENTRESTO) 24-26 MG per tablet Taking 1/2 tablet twice a day 2/11/22  Yes ROLO Iraheta - CNS   VITAMIN D PO Take by mouth   Yes Historical Provider, MD   Nebulizers MISC by Does not apply route   Yes Historical Provider, MD   OXYGEN Inhale into the lungs 2.5 L at HS   Yes Historical Provider, MD   ipratropium-albuterol (Norva Patron) 0.5-2.5 (3) MG/3ML SOLN nebulizer solution Inhale 3 mLs into the lungs every 4 hours 5/6/21  Yes Willam Grey MD   albuterol sulfate HFA (VENTOLIN HFA) 108 (90 Base) MCG/ACT inhaler Inhale 2 puffs into the lungs 4 times daily as needed for Wheezing 4/13/21  Yes Willam Grey MD   insulin glargine (BASAGLAR KWIKPEN) 100 UNIT/ML injection pen Inject 55 Units into the skin nightly    Yes Historical Provider, MD   amitriptyline (ELAVIL) 25 MG tablet Take 25 mg by mouth nightly   Yes Historical Provider, MD   clopidogrel (PLAVIX) 75 MG tablet Take 1 tablet by mouth daily 2/7/19  Yes Zoe Knox MD   tolterodine (DETROL) 2 MG tablet Take 2 mg by mouth 2 times daily   Yes Historical Provider, MD   Dulaglutide (TRULICITY) 1.5 AA/3.7SL SOPN Inject 1.5 mg into the skin once a week    Yes Historical Provider, MD   famotidine (PEPCID) 20 MG tablet Take 20 mg by mouth 2 times daily   Yes Historical Provider, MD   nitroGLYCERIN (NITROSTAT) 0.4 MG SL tablet Place 1 tablet under the tongue every 5 minutes as needed for Chest pain (No more than 3 tablets in a 15 minute period. ). 6/12/14  Yes Quoc Don MD   glucose blood VI test strips (ASCENSIA AUTODISC VI;ONE TOUCH ULTRA TEST VI) strip 1 each by Does not apply route daily. As needed. Yes Historical Provider, MD   metformin (GLUCOPHAGE) 1000 MG tablet Take 1 tablet by mouth 2 times daily (with meals). 10/28/11  Yes Idalia Resendez MD   ferrous sulfate 325 (65 FE) MG tablet Take 325 mg by mouth 2 times daily. Yes Historical Provider, MD   gabapentin (NEURONTIN) 600 MG tablet Take 600 mg by mouth 2 times daily. .   Yes Historical Provider, MD   therapeutic multivitamin-minerals (THERAGRAN-M) tablet Take 2 tablets by mouth daily. Yes Historical Provider, MD   levothyroxine (SYNTHROID) 25 MCG tablet  1/12/22   Historical Provider, MD   apixaban (ELIQUIS) 2.5 MG TABS tablet Take 1 tablet by mouth 2 times daily 2/11/22   ROLO Tellez - CNP        Allergies:  Penicillins     Review of Systems:   · Constitutional: there has been no unanticipated weight loss. There's been no change in energy level, sleep pattern, or activity level. · Eyes: No visual changes or diplopia. No scleral icterus. · ENT: No Headaches, hearing loss or vertigo. No mouth sores or sore throat. · Cardiovascular: Reviewed in HPI  · Respiratory: No cough or wheezing, no sputum production. No hematemesis. · Gastrointestinal: No abdominal pain, appetite loss, blood in stools. No change in bowel or bladder habits. · Genitourinary: No dysuria, trouble voiding, or hematuria. · Musculoskeletal:  No gait disturbance, weakness or joint complaints. · Integumentary: No rash or pruritis. · Neurological: No headache, diplopia, change in muscle strength, numbness or tingling. No change in gait, balance, coordination, mood, affect, memory, mentation, behavior.   · Psychiatric: No anxiety, no depression. · Endocrine: No malaise, fatigue or temperature intolerance. No excessive thirst, fluid intake, or urination. No tremor. · Hematologic/Lymphatic: No abnormal bruising or bleeding, blood clots or swollen lymph nodes. · Allergic/Immunologic: No nasal congestion or hives. Physical Examination:    Vitals:    02/11/22 1329   BP: (!) 90/50   Pulse: 100   SpO2: 93%   Weight: 147 lb (66.7 kg)   Height: 5' 5\" (1.651 m)        Constitutional and General Appearance: Warm and dry, no apparent distress, normal coloration  HEENT:  Normocephalic, atraumatic  Respiratory:  · Normal excursion and expansion without use of accessory muscles  · Resp Auscultation: no rhonchi, rales or wheezing  Cardiovascular:  · The apical impulses not displaced  · Heart tones are crisp and normal  · JVP normal cm H2O  · Regular rate and rhythm  · Peripheral pulses are symmetrical and full  · There is no clubbing, cyanosis of the extremities.   · no edema  · Pedal Pulses: 2+ and equal   Abdomen:  · No masses or tenderness  · Liver/Spleen: No Abnormalities Noted  Neurological/Psychiatric:  · Alert and oriented in all spheres  · Moves all extremities well  · Exhibits normal gait balance and coordination  · No abnormalities of mood, affect, memory, mentation, or behavior are noted    Lab Data:    CBC:   Lab Results   Component Value Date    WBC 11.3 12/10/2021    WBC 11.0 10/08/2021    WBC 11.0 09/24/2021    RBC 4.56 12/10/2021    RBC 4.73 10/08/2021    RBC 4.63 09/24/2021    HGB 13.3 12/10/2021    HGB 13.9 10/08/2021    HGB 13.7 09/24/2021    HCT 40.1 12/10/2021    HCT 41.9 10/08/2021    HCT 41.0 09/24/2021    MCV 87.9 12/10/2021    MCV 88.6 10/08/2021    MCV 88.6 09/24/2021    RDW 14.2 12/10/2021    RDW 15.4 10/08/2021    RDW 16.2 09/24/2021     12/10/2021     10/08/2021     09/24/2021     BMP:  Lab Results   Component Value Date     02/10/2022     01/05/2022     12/10/2021    K 4.4 02/10/2022    K 5.2 01/05/2022    K 4.4 12/10/2021    K 3.7 03/04/2021    CL 94 02/10/2022    CL 97 01/05/2022    CL 95 12/10/2021    CO2 24 02/10/2022    CO2 27 01/05/2022    CO2 26 12/10/2021    BUN 38 02/10/2022    BUN 51 01/05/2022    BUN 51 12/10/2021    CREATININE 1.5 02/10/2022    CREATININE 1.6 01/05/2022    CREATININE 1.4 12/10/2021     BNP:   Lab Results   Component Value Date    PROBNP 1,802 02/10/2022    PROBNP 1,593 01/05/2022    PROBNP 1,316 12/10/2021     Cardiac Imaging:  Echo 1/24/2022  Summary   -Upper normal left ventricle size, wall thickness and severely reduced   systolic function with an estimated ejection fraction of 20%. -There is severe global hypokinesis with exception of the basal region.   -There is akinesis of the apical walls. -Grade II diastolic dysfunction with elevated LV filling   pressures. E/e\"=16.95.   -Mitral annular calcification is present. -Mild mitral regurgitation.   -The aortic valve is mildly thickened/calcified but opens well with normal   gradients.   -Trivial aortic regurgitation.   -Mild tricuspid regurgitation.   -Estimated pulmonary artery systolic pressure is mildly elevated at 41 mmHg   assuming a right atrial pressure of 8 mmHg. -The left atrium is mildly dilated. -Right ventricular systolic function is mildly reduced with apical   hypokinesis. TAPSE 1.4 cm.   -Pacer / ICD wire is visualized in the right ventricle    Echo: 08/23/21   Left ventricular systolic function is reduced with ejection fraction   estimated at 20 %. Akinesis of the inferior, septal and apical septal walls with severe   hypokinesis of the remaining walls. Left ventricular cavity size is mildly dilated with mild concentric left   ventricular hypertrophy. Grade III diastolic dysfunction with elevated filling pressure. TAPSE is measured at 12.9 mm.   S'' prime velocity is measured at 6.2 cm/s. Right ventricular systolic function is moderately reduced . Biatrial enlargement.    The aortic root is mildly dilated. Moderate mitral and tricuspid regurgitation. Systolic pulmonary artery pressure (SPAP) estimated at 57 mmHg (right atrial   pressure 8 mmHg), consistent with moderate pulmonary hypertension. Cardiac cath: 04/28/21  Intervention  ~Successful PCI to LAD with 3.0x32 JOANNA. PD with 3.5x15 NC to 22atm. Excellent Result. Contrast: 77  Flouro Time: 17.2  Access: R CFA/CFV. Perc stick in sfa (high bifurcation)     Impression  ~Coronary Angiography w/ severe single vessel CAD  ~LVG with LVEF of 20 and LAD regional wall motion abnormalities  ~Successful complex angioplasty and stenting of LAD    Assessment:    1. Chronic systolic heart failure (HCC) on arni, bb, digoxin, aldosterone antagonist and farxiga    2. Vitamin D deficiency    3. Essential hypertension    4. LBBB (left bundle branch block)    5. CAD S/P percutaneous coronary angioplasty    6. Pulmonary fibrosis (HCC) on 2.5 L of oxygen at night   7. ICD in place      Plan:   Patient Instructions   1. Refills done  2. Increase toprol to 100 mg daily  3. Increase digoxin 125 mcg every day except mon and fri  4. Continue all other medications  5. Labs monthly  6. Call when he gets back from [facility] to schedule follow up    Dr Beni Buchanan came into the room to confirm the above changes and see how well patient is doing. appt with Dr Beni Buchanan cancelled for 2/18/2022    The son will get labs done in [facility] and send them to us to review. He will also get patient seen by a cardiologist while in [facility].     NYHA IV    I appreciate the opportunity of cooperating in the care of this individual.    Morris Marquez, APRN - CNS, CNS, 2/11/2022, 3:12 PM

## 2022-02-14 PROCEDURE — 93284 PRGRMG EVAL IMPLANTABLE DFB: CPT | Performed by: INTERNAL MEDICINE

## 2022-02-14 NOTE — PROGRESS NOTES
Patient comes in for programming evaluation for his defibrillator. Brought Patient in to the device clinic today dot to:   · CRT Pacing is effective less than 90% of the time. · Possible high LV threshold on 11-Feb-2022. · Unable to measure LV thresholds in last 7 days. S/p mdtbivicd on 10/8/2021. Battery life 5.5 years  AP 0.6%.  97.0%. Effective 0.3%  No episodes noted. Patient remains on Eliquis, metoprolol and Digoxin. Changes made this Session and Patient will be scheduled for LV lead Revision. Please see interrogation for more detail. Optivol is WNL. Patient will follow up after LV lead revision.

## 2022-02-15 ENCOUNTER — HOSPITAL ENCOUNTER (OUTPATIENT)
Dept: CARDIAC CATH/INVASIVE PROCEDURES | Age: 82
Discharge: HOME | End: 2022-02-15
Attending: INTERNAL MEDICINE | Admitting: INTERNAL MEDICINE
Payer: COMMERCIAL

## 2022-02-15 ENCOUNTER — APPOINTMENT (OUTPATIENT)
Dept: GENERAL RADIOLOGY | Age: 82
End: 2022-02-15
Attending: INTERNAL MEDICINE
Payer: COMMERCIAL

## 2022-02-15 VITALS
SYSTOLIC BLOOD PRESSURE: 128 MMHG | WEIGHT: 147 LBS | TEMPERATURE: 98 F | DIASTOLIC BLOOD PRESSURE: 72 MMHG | BODY MASS INDEX: 24.49 KG/M2 | HEART RATE: 83 BPM | OXYGEN SATURATION: 95 % | HEIGHT: 65 IN

## 2022-02-15 DIAGNOSIS — Z95.810 ICD (IMPLANTABLE CARDIOVERTER-DEFIBRILLATOR), BIVENTRICULAR, IN SITU: Primary | ICD-10-CM

## 2022-02-15 LAB
ANION GAP SERPL CALCULATED.3IONS-SCNC: 11 MMOL/L (ref 3–16)
BUN BLDV-MCNC: 37 MG/DL (ref 7–20)
CALCIUM SERPL-MCNC: 9.5 MG/DL (ref 8.3–10.6)
CHLORIDE BLD-SCNC: 98 MMOL/L (ref 99–110)
CO2: 31 MMOL/L (ref 21–32)
CREAT SERPL-MCNC: 1.4 MG/DL (ref 0.8–1.3)
GFR AFRICAN AMERICAN: 59
GFR NON-AFRICAN AMERICAN: 49
GLUCOSE BLD-MCNC: 136 MG/DL (ref 70–99)
HCT VFR BLD CALC: 42.6 % (ref 40.5–52.5)
HEMOGLOBIN: 13.9 G/DL (ref 13.5–17.5)
MCH RBC QN AUTO: 29.3 PG (ref 26–34)
MCHC RBC AUTO-ENTMCNC: 32.6 G/DL (ref 31–36)
MCV RBC AUTO: 89.9 FL (ref 80–100)
PDW BLD-RTO: 14.2 % (ref 12.4–15.4)
PLATELET # BLD: 223 K/UL (ref 135–450)
PMV BLD AUTO: 9.9 FL (ref 5–10.5)
POTASSIUM SERPL-SCNC: 4.9 MMOL/L (ref 3.5–5.1)
RBC # BLD: 4.74 M/UL (ref 4.2–5.9)
SODIUM BLD-SCNC: 140 MMOL/L (ref 136–145)
WBC # BLD: 12.6 K/UL (ref 4–11)

## 2022-02-15 PROCEDURE — C1900 LEAD, CORONARY VENOUS: HCPCS

## 2022-02-15 PROCEDURE — C1887 CATHETER, GUIDING: HCPCS

## 2022-02-15 PROCEDURE — 33226 REPOSITION L VENTRIC LEAD: CPT | Performed by: INTERNAL MEDICINE

## 2022-02-15 PROCEDURE — C1769 GUIDE WIRE: HCPCS

## 2022-02-15 PROCEDURE — 6360000002 HC RX W HCPCS

## 2022-02-15 PROCEDURE — 2500000003 HC RX 250 WO HCPCS

## 2022-02-15 PROCEDURE — 2580000003 HC RX 258

## 2022-02-15 PROCEDURE — 99153 MOD SED SAME PHYS/QHP EA: CPT

## 2022-02-15 PROCEDURE — 2780000010 HC IMPLANT OTHER

## 2022-02-15 PROCEDURE — 71045 X-RAY EXAM CHEST 1 VIEW: CPT

## 2022-02-15 PROCEDURE — 80048 BASIC METABOLIC PNL TOTAL CA: CPT

## 2022-02-15 PROCEDURE — 93005 ELECTROCARDIOGRAM TRACING: CPT | Performed by: INTERNAL MEDICINE

## 2022-02-15 PROCEDURE — 33224 INSERT PACING LEAD & CONNECT: CPT

## 2022-02-15 PROCEDURE — 99152 MOD SED SAME PHYS/QHP 5/>YRS: CPT | Performed by: INTERNAL MEDICINE

## 2022-02-15 PROCEDURE — 85027 COMPLETE CBC AUTOMATED: CPT

## 2022-02-15 PROCEDURE — 33999 UNLISTED PX CARDIAC SURGERY: CPT

## 2022-02-15 PROCEDURE — 99152 MOD SED SAME PHYS/QHP 5/>YRS: CPT

## 2022-02-15 PROCEDURE — 36415 COLL VENOUS BLD VENIPUNCTURE: CPT

## 2022-02-15 RX ORDER — OXYCODONE HYDROCHLORIDE 5 MG/1
5 TABLET ORAL EVERY 4 HOURS PRN
Status: CANCELLED | OUTPATIENT
Start: 2022-02-15

## 2022-02-15 RX ORDER — ONDANSETRON 2 MG/ML
4 INJECTION INTRAMUSCULAR; INTRAVENOUS EVERY 6 HOURS PRN
Status: CANCELLED | OUTPATIENT
Start: 2022-02-15

## 2022-02-15 RX ORDER — OXYCODONE HYDROCHLORIDE 5 MG/1
10 TABLET ORAL EVERY 4 HOURS PRN
Status: CANCELLED | OUTPATIENT
Start: 2022-02-15

## 2022-02-15 RX ORDER — SODIUM CHLORIDE 9 MG/ML
25 INJECTION, SOLUTION INTRAVENOUS PRN
Status: CANCELLED | OUTPATIENT
Start: 2022-02-15

## 2022-02-15 RX ORDER — SODIUM CHLORIDE 0.9 % (FLUSH) 0.9 %
5-40 SYRINGE (ML) INJECTION PRN
Status: DISCONTINUED | OUTPATIENT
Start: 2022-02-15 | End: 2022-02-15 | Stop reason: HOSPADM

## 2022-02-15 RX ORDER — OXYCODONE HYDROCHLORIDE AND ACETAMINOPHEN 5; 325 MG/1; MG/1
1 TABLET ORAL EVERY 6 HOURS PRN
Qty: 12 TABLET | Refills: 0 | Status: SHIPPED | OUTPATIENT
Start: 2022-02-15 | End: 2022-02-18

## 2022-02-15 RX ORDER — SODIUM CHLORIDE 0.9 % (FLUSH) 0.9 %
5-40 SYRINGE (ML) INJECTION PRN
Status: CANCELLED | OUTPATIENT
Start: 2022-02-15

## 2022-02-15 RX ORDER — SODIUM CHLORIDE 0.9 % (FLUSH) 0.9 %
5-40 SYRINGE (ML) INJECTION EVERY 12 HOURS SCHEDULED
Status: CANCELLED | OUTPATIENT
Start: 2022-02-15

## 2022-02-15 NOTE — PROCEDURES
[address]     Electrophysiology Procedure Note       Date of Procedure: 2/15/2022  Patient's Name: Aidan Mcneil  YOB: 1940   Medical Record Number: [id number]  Procedure Performed by: Madonna Jane MD    Procedures performed:    ·    · Removal of ICD generator  · I   · Removal of previously inserted LV lead  · Insertion of MRI compatible left ventricular lead under fluoroscopy  · Reinsertion of BiV ICD  ·    · Electronic analysis of lead and device. · IV sedation       Blood loss: 10 cc  Complications: none   This was an unusually difficult and complicated procedure due to stenosis of venous track and need for multiple dilators applications and also difficulty accessing the coronary sinus which made the procedure twice as long as average. Indication of the procedure:    Aidan Mcneil is a 80 y.o. male with ishcemic  cardiomyopathy, status post BiV ICD. The LV lead had dislodged. Details of procedure: The patient was brought to the electrophysiology laboratory in stable condition. The patient was in a fasting and non-sedated state. The risks, benefits and alternatives of the procedure were discussed with the patient. The risks including, but not limited to, the risks of vascular injury, bleeding, infection, device malfunction, lead dislodgement, radiation exposure, injury to cardiac and surrounding structures (including pneumothorax), stroke, myocardial infarction and death were discussed in detail. The patient opted to proceed with the device implantation. Written informed consent was signed and placed in the chart. Prophylactic antibiotic was given. An independent trained observer pushed medications at my direction. We monitored the patient's level of consciousness and vital signs/physiologic status throughout the procedure duration (see start and stop times below).   Sedation:  3 mg Versed, 125 mcg Fentanyl  Sedation start: 1553  Sedation stop: 1724       The patient was prepped and draped in a sterile fashion. A timeout protocol was completed to identify the patient and the procedure being performed. Pre-sedation evaluation and airway assessment (Mallampatti classification, class lI) was completed. IV sedation was provided with IV Versed, Fentanyl. An incision was made in the left pectoral area after administration of lidocaine. Using electrocautery and blunt dissection, a pocket was opened. Device was removed. The LV lead was removed and removed from the scar tissue. Then a whisper wire was advanced into the knee. The lead was pulled back over the wire to maintain access. And using a small sheath a whisper wire was changed to a Glidewire. There was significant stenosis of the venous track. We had to use multiple dilators to allow for the CS catheter to be advanced. Once the dilation was done the CS catheter was advanced and several attempts were able to engage the coronary sinus. In be engaged the posterior lateral branch and the new LV lead was advanced into the branch. There were no other options. The pocket was irrigated with   solution. The leads were then connected to the new pulse generator (Cardiac resynchronization therapy(CRT)  ) which was then placed into the cleaned pocket. The pulse generator was sutured inside the pocket. The pocket was then closed in three separate subcutaneous layers using 3-0  Vicryl and subcuticular layer using 4-0 Vicryl. . Steristrips was used on the skin. The skin was covered with pressure dressing. Antibiotic envelope Tyrex was used. All sponge and needle counts were reported as correct at the end of the procedure. The patient tolerated the procedure well and there were no complications. Post-sedation evaluation was completed. Patient was transported to the holding area in stable condition.     DEVICE and LEADS information:          Plan:   The patient will be observed for a few hours and discharged home if is stable and have usual post-implant care, including chest x-ray,  and interrogation of the device.

## 2022-02-16 LAB
EKG ATRIAL RATE: 83 BPM
EKG DIAGNOSIS: NORMAL
EKG P AXIS: 100 DEGREES
EKG P-R INTERVAL: 252 MS
EKG Q-T INTERVAL: 406 MS
EKG QRS DURATION: 166 MS
EKG QTC CALCULATION (BAZETT): 477 MS
EKG R AXIS: -30 DEGREES
EKG T AXIS: 145 DEGREES
EKG VENTRICULAR RATE: 83 BPM

## 2022-02-16 PROCEDURE — 93010 ELECTROCARDIOGRAM REPORT: CPT | Performed by: INTERNAL MEDICINE

## 2022-02-23 ENCOUNTER — NURSE ONLY (OUTPATIENT)
Dept: CARDIOLOGY CLINIC | Age: 82
End: 2022-02-23
Payer: COMMERCIAL

## 2022-02-23 DIAGNOSIS — Z95.810 ICD (IMPLANTABLE CARDIOVERTER-DEFIBRILLATOR), BIVENTRICULAR, IN SITU: Primary | ICD-10-CM

## 2022-02-23 DIAGNOSIS — I25.5 ISCHEMIC CARDIOMYOPATHY: ICD-10-CM

## 2022-02-23 NOTE — PROGRESS NOTES
Patient comes in for programming evaluation for his defibrillator. All sensing and pacing parameters are within normal range. S/p LV lead revision on 2/15/2022  Lead trends stable  Battery life 6.5 years  AP 0.2%.  97.9%. No episodes noted. Patient remains on Eliquis, metoprolol, digoxin and torsemide. Patients incision is healing nicely. Patient to call the office immediately with any signs on infection. No changes need to be made at this time. Please see interrogation for more detail. Optivol is slightly elevated. Possible OptiVol fluid accumulation: 19-Feb-2022 -- ongoing. Possibly from surgery. Patient will follow up in 3 months in office or remotely.

## 2022-02-24 PROCEDURE — 93284 PRGRMG EVAL IMPLANTABLE DFB: CPT | Performed by: INTERNAL MEDICINE

## 2022-02-26 ENCOUNTER — PATIENT MESSAGE (OUTPATIENT)
Dept: CARDIOLOGY CLINIC | Age: 82
End: 2022-02-26

## 2022-02-26 DIAGNOSIS — I50.22 CHRONIC SYSTOLIC HEART FAILURE (HCC): ICD-10-CM

## 2022-02-28 RX ORDER — DIGOXIN 125 MCG
125 TABLET ORAL SEE ADMIN INSTRUCTIONS
Qty: 60 TABLET | Refills: 1 | Status: SHIPPED | OUTPATIENT
Start: 2022-02-28

## 2022-02-28 NOTE — TELEPHONE ENCOUNTER
Prescription refill    Last OV:02/11/2022    Last Refill:02/11/2022    Labs:02/15/2022    Future Appt:10/10/2022

## 2022-02-28 NOTE — TELEPHONE ENCOUNTER
From: Kolby Kwon  To: Cruz Seth  Sent: 2/26/2022 5:18 PM EST  Subject: Digoxin 125mcg refill    Hi Adriana,    During my Dad's last visit on 2/11, Dr. Goldy Jeong and you recommended taking Digoxin 125 mcg every day except Monday and Friday. But when I checked with [address] [place] [address] today they mentioned they have not received any prescription (they have only from the last year). I would appreciate it if you can send Digoxin 125mcg refill prescription to Marissa at [facility] for 90 days supply. Thanks a lot in [employer] for your help.   Farzana Riggs  [phone]

## 2022-03-02 ENCOUNTER — OFFICE VISIT (OUTPATIENT)
Dept: ENT CLINIC | Age: 82
End: 2022-03-02
Payer: COMMERCIAL

## 2022-03-02 VITALS — SYSTOLIC BLOOD PRESSURE: 103 MMHG | DIASTOLIC BLOOD PRESSURE: 65 MMHG | TEMPERATURE: 97.1 F

## 2022-03-02 DIAGNOSIS — H93.13 SUBJECTIVE TINNITUS OF BOTH EARS: Chronic | ICD-10-CM

## 2022-03-02 DIAGNOSIS — H91.93 BILATERAL HEARING LOSS, UNSPECIFIED HEARING LOSS TYPE: Primary | Chronic | ICD-10-CM

## 2022-03-02 PROCEDURE — 99213 OFFICE O/P EST LOW 20 MIN: CPT | Performed by: OTOLARYNGOLOGY

## 2022-03-02 NOTE — PATIENT INSTRUCTIONS
1. You may proceed with hearing testing and hearing aid check with Dr. Milly Segal. 2. Schedule an appointment for ear recheck and possible cleaning in the future if your hearing is decreased, or you have a sensation of ear wax build up or are told you have ear wax build up by your primary physician or other health care provider. 3. You may use an over the counter ear wax removal kit (such as Murine, Bausch and Lomb, NeilMed, or Debrox wax removal system) for ear wax removal, as needed. 4. It may help to use Debrox (OTC) for 4 days prior to future visits for ear cleaning. This may soften your ear wax and facilitate removal of the wax. NO Q-TIPS OR OTHER INSTRUMENTS/OBJECTS IN THE EARS   You should never clean your ears with a Q-tip, cotton tipped applicator, Natalie pin, paper clip, safety pin, pen cap, or any other instrument. This will tend to push wax in deeper and pack the ear canal with wax. There is a high risk and danger of this practice, especially rupture of ear drum, dislocation or other damage to ossicles, and permanent, irreversible, and irreparable hearing loss. It may cause inflammation and irritation of the ear canal and cause itching or pain. I recommend only use of one the several ear wax removal kits available \"over the counter\" if you feel a need to try to remove ear wax. For example, Murine, Bausch and Lomb, NeilMed, or Debrox ear wax removal kits may be used for ear wax removal, as needed. No other methods should be self used for cleaning your ears.

## 2022-03-02 NOTE — PROGRESS NOTES
[address] [facility]        PCP:  Juan Velez MD      CHIEF COMPLAINT  Chief Complaint   Patient presents with    Hearing Loss    Tinnitus       HISTORY OF PRESENT ILLNESS       Suyapa Bullock is a 80 y.o. male here for recheck and follow up of hearing loss and tinnitus. He wears hearing aids. He is going to return to [facility] soon and will be there for six months. He is going to see Rei Hsu for hearing and hearing aid check later today. He is concerned (son) regarding was build up in the ear or worsening of his hearing. He seems to be having increased difficulty hearing gradually over the past year. He has tinnitus in both ears. REVIEW OF SYSTEMS   Review of Systems   Constitutional: Negative for chills and fever. HENT: Positive for hearing loss and tinnitus. Negative for ear discharge and ear pain.           PAST MEDICAL HISTORY    Past Medical History:   Diagnosis Date    Acute MI ([address]     CAD (coronary artery disease)     CHF (congestive heart failure), NYHA class III ([address]     Diabetes mellitus ([address]     HTN (hypertension) 10/26/2011    Hyperlipidemia 10/26/2011         Past Surgical History:   Procedure Laterality Date    CARDIAC SURGERY      stent  x1   2008    CAROTID ENDARTERECTOMY Left 11/02/2016    LEFT CAROTID ENDARTERECTOMY WITH PATCH ANGIOPLASTY    CORONARY ANGIOPLASTY WITH STENT PLACEMENT  05/31/2018    EYE SURGERY Bilateral     cataracts w/lens         EXAMINATION    Vitals:    03/02/22 0819   BP: 103/65   Temp: 97.1 °F (36.2 °C)   TempSrc: Temporal       ENT Physical Exam  Consititutional  Appearance: patient appears well-developed, well-nourished and well-groomed,  Head and Face  Appearance: head appears normal, face appears normal and face appears atraumatic;  Ear  Auricles: right auricle normal; left auricle normal;  Ear Canals: right ear canal normal; left ear canal normal;  Ear comments: TMs dull and thick bilaterally with normal mobility and no ROBBY. PORSCHE / Glenn Smith / Narciso Mayi was seen today for hearing loss and tinnitus. Diagnoses and all orders for this visit:    Bilateral hearing loss, unspecified hearing loss type  Comments:  increased over the past year    Subjective tinnitus of both ears  Comments:  stable         RECOMMENDATIONS/PLAN      1. Debrox (OTC) as needed. 2. Proceed with hearing evaluation with Dr. Sandra Chinchilla  3. Return for any further ENT or sinus problems or symptoms.

## 2022-03-24 ENCOUNTER — NURSE ONLY (OUTPATIENT)
Dept: CARDIOLOGY CLINIC | Age: 82
End: 2022-03-24
Payer: COMMERCIAL

## 2022-03-24 ENCOUNTER — TELEPHONE (OUTPATIENT)
Dept: CARDIOLOGY CLINIC | Age: 82
End: 2022-03-24

## 2022-03-24 DIAGNOSIS — I50.22 CHRONIC SYSTOLIC (CONGESTIVE) HEART FAILURE (HCC): ICD-10-CM

## 2022-03-24 DIAGNOSIS — Z95.810 ICD (IMPLANTABLE CARDIOVERTER-DEFIBRILLATOR), BIVENTRICULAR, IN SITU: ICD-10-CM

## 2022-03-24 DIAGNOSIS — I25.5 ISCHEMIC CARDIOMYOPATHY: ICD-10-CM

## 2022-03-24 PROCEDURE — 93297 REM INTERROG DEV EVAL ICPMS: CPT | Performed by: NURSE PRACTITIONER

## 2022-03-24 PROCEDURE — G2066 INTER DEVC REMOTE 30D: HCPCS | Performed by: NURSE PRACTITIONER

## 2022-03-24 NOTE — RESULT ENCOUNTER NOTE
Reviewed, pt due f/u with RG if he is back from [facility].   Viki Iglesias

## 2022-03-24 NOTE — PROGRESS NOTES
Remote transmission received from patient's CRT-D monitor at home. Transmission shows normal sensing and pacing function. No new arrhythmias/events recorded. Ap 0.3%  BiVp 97.8%, Effective 97.7%, VSRp 1.5%    Possible Optivol fluid accumulation 02.19.22-ongoing, currently elevated up to 100 w decreasing trend noted; TI back near reference line. NP will review. See interrogation under cardiology tab in the [address] [facility] [address] field for more details. Will continue to monitor remotely.

## 2022-03-24 NOTE — TELEPHONE ENCOUNTER
----- Message from ROLO Sands - CNP sent at 3/24/2022  2:30 PM EDT -----  Reviewed, pt due f/u with RG if he is back from [facility].   Sandy Bahenas

## 2022-05-10 NOTE — TELEPHONE ENCOUNTER
Received refill request for Eliquis from [facility].     Last ov:08/19/2021 MXA    Last labs:02/15/2022 CBC & BBMP    Last Refill:02/11/2022 #180 tabs w/ 1 refill    Next appointment:09/26/2022 MARCIA

## 2022-05-12 ENCOUNTER — NURSE ONLY (OUTPATIENT)
Dept: CARDIOLOGY CLINIC | Age: 82
End: 2022-05-12
Payer: COMMERCIAL

## 2022-05-12 DIAGNOSIS — Z95.810 ICD (IMPLANTABLE CARDIOVERTER-DEFIBRILLATOR), BIVENTRICULAR, IN SITU: ICD-10-CM

## 2022-05-12 DIAGNOSIS — I50.22 CHRONIC SYSTOLIC HEART FAILURE (HCC): ICD-10-CM

## 2022-05-12 DIAGNOSIS — I50.32 CHRONIC DIASTOLIC CONGESTIVE HEART FAILURE (HCC): ICD-10-CM

## 2022-05-12 DIAGNOSIS — I25.5 ISCHEMIC CARDIOMYOPATHY: ICD-10-CM

## 2022-05-12 PROCEDURE — 93296 REM INTERROG EVL PM/IDS: CPT | Performed by: INTERNAL MEDICINE

## 2022-05-12 PROCEDURE — 93297 REM INTERROG DEV EVAL ICPMS: CPT | Performed by: INTERNAL MEDICINE

## 2022-05-12 PROCEDURE — 93295 DEV INTERROG REMOTE 1/2/MLT: CPT | Performed by: INTERNAL MEDICINE

## 2022-05-12 NOTE — PROGRESS NOTES
We received remote transmission from patient's monitor at home. Transmission shows normal sensing and pacing function. EP physician will review. See interrogation under cardiology tab in the [address] [facility] [address] field for more details. Optivol is within normal range. not this time

## 2022-06-23 ENCOUNTER — NURSE ONLY (OUTPATIENT)
Dept: CARDIOLOGY CLINIC | Age: 82
End: 2022-06-23
Payer: COMMERCIAL

## 2022-06-23 DIAGNOSIS — I25.5 ISCHEMIC CARDIOMYOPATHY: ICD-10-CM

## 2022-06-23 DIAGNOSIS — I50.22 CHRONIC SYSTOLIC HF (HEART FAILURE) (HCC): ICD-10-CM

## 2022-06-23 DIAGNOSIS — Z95.810 ICD (IMPLANTABLE CARDIOVERTER-DEFIBRILLATOR), BIVENTRICULAR, IN SITU: ICD-10-CM

## 2022-06-23 PROCEDURE — 93297 REM INTERROG DEV EVAL ICPMS: CPT | Performed by: CLINICAL NURSE SPECIALIST

## 2022-06-23 PROCEDURE — G2066 INTER DEVC REMOTE 30D: HCPCS | Performed by: CLINICAL NURSE SPECIALIST

## 2022-06-23 NOTE — PROGRESS NOTES
Remote transmission received from patient's CRT-D home monitor. Transmission shows normal sensing and pacing function. No new arrhythmias/ events recorded. AP 11.0%, CRT 98.4%, Effective 98.4%, VSRp 1.4%    Optivol is within normal range. TriageHF Heart Failure Risk Status on 23-Jun-2022 is Medium*    NP will review. See interrogation under cardiology tab in the [address] [place] [address] [address] field for more details. Will continue to monitor remotely. (End of 31-day monitoring period 6/23/22, HF diagnostics).

## 2022-08-10 DIAGNOSIS — I50.22 CHRONIC SYSTOLIC HEART FAILURE (HCC): ICD-10-CM

## 2022-08-11 ENCOUNTER — NURSE ONLY (OUTPATIENT)
Dept: CARDIOLOGY CLINIC | Age: 82
End: 2022-08-11
Payer: COMMERCIAL

## 2022-08-11 DIAGNOSIS — I25.5 ISCHEMIC CARDIOMYOPATHY: Primary | ICD-10-CM

## 2022-08-11 DIAGNOSIS — I50.22 CHRONIC SYSTOLIC HF (HEART FAILURE) (HCC): ICD-10-CM

## 2022-08-11 DIAGNOSIS — Z95.810 ICD (IMPLANTABLE CARDIOVERTER-DEFIBRILLATOR), BIVENTRICULAR, IN SITU: ICD-10-CM

## 2022-08-11 PROCEDURE — 93297 REM INTERROG DEV EVAL ICPMS: CPT | Performed by: INTERNAL MEDICINE

## 2022-08-11 PROCEDURE — 93296 REM INTERROG EVL PM/IDS: CPT | Performed by: INTERNAL MEDICINE

## 2022-08-11 PROCEDURE — 93295 DEV INTERROG REMOTE 1/2/MLT: CPT | Performed by: INTERNAL MEDICINE

## 2022-08-11 RX ORDER — METOPROLOL SUCCINATE 100 MG/1
TABLET, EXTENDED RELEASE ORAL
Qty: 90 TABLET | Refills: 1 | Status: SHIPPED | OUTPATIENT
Start: 2022-08-11

## 2022-08-11 RX ORDER — TORSEMIDE 100 MG/1
TABLET ORAL
Qty: 68 TABLET | Refills: 1 | Status: SHIPPED | OUTPATIENT
Start: 2022-08-11

## 2022-08-11 NOTE — PROGRESS NOTES
Remote transmission received from patient's CRT-D home monitor. Transmission shows normal sensing and pacing function. No new arrhythmias/ events recorded. AP 0.4%, CRT 98.3%, Effective 98.2%, VSRp 1.6%    Optivol is within normal range. TriageF Heart Failure Risk Status on 11-Aug-2022 is Medium*    EP/ HF MD will review. See interrogation under cardiology tab in the [address] [place] [address] [address] field for more details. Will continue to monitor remotely. (End of 91-day monitoring period 8/11/22).

## 2022-09-22 ENCOUNTER — NURSE ONLY (OUTPATIENT)
Dept: CARDIOLOGY CLINIC | Age: 82
End: 2022-09-22
Payer: COMMERCIAL

## 2022-09-22 DIAGNOSIS — I25.5 ISCHEMIC CARDIOMYOPATHY: ICD-10-CM

## 2022-09-22 DIAGNOSIS — I50.22 CHRONIC SYSTOLIC CONGESTIVE HEART FAILURE, NYHA CLASS 3 (HCC): ICD-10-CM

## 2022-09-22 DIAGNOSIS — Z95.810 ICD (IMPLANTABLE CARDIOVERTER-DEFIBRILLATOR), BIVENTRICULAR, IN SITU: Primary | ICD-10-CM

## 2022-09-22 PROCEDURE — G2066 INTER DEVC REMOTE 30D: HCPCS | Performed by: INTERNAL MEDICINE

## 2022-09-22 PROCEDURE — 93297 REM INTERROG DEV EVAL ICPMS: CPT | Performed by: INTERNAL MEDICINE

## 2022-11-10 ENCOUNTER — NURSE ONLY (OUTPATIENT)
Dept: CARDIOLOGY CLINIC | Age: 82
End: 2022-11-10
Payer: COMMERCIAL

## 2022-11-10 DIAGNOSIS — Z95.810 ICD (IMPLANTABLE CARDIOVERTER-DEFIBRILLATOR), BIVENTRICULAR, IN SITU: Primary | ICD-10-CM

## 2022-11-10 DIAGNOSIS — I25.5 ISCHEMIC CARDIOMYOPATHY: ICD-10-CM

## 2022-11-10 DIAGNOSIS — I50.22 CHRONIC SYSTOLIC HF (HEART FAILURE) (HCC): ICD-10-CM

## 2022-11-10 PROCEDURE — 93296 REM INTERROG EVL PM/IDS: CPT | Performed by: INTERNAL MEDICINE

## 2022-11-10 PROCEDURE — 93295 DEV INTERROG REMOTE 1/2/MLT: CPT | Performed by: INTERNAL MEDICINE

## 2022-11-10 PROCEDURE — 93297 REM INTERROG DEV EVAL ICPMS: CPT | Performed by: INTERNAL MEDICINE

## 2022-11-10 NOTE — PROGRESS NOTES
Remote transmission received from patient's CRT-D home monitor. Transmission shows normal sensing and pacing function. No new arrhythmias/ events recorded. AP 1.6%, CRT 99.9%, Effective 99.9%, VSRp <0.1%    Optivol is within normal range  TriageHF Heart Failure Risk Status on 10-Nov-2022 is Medium    EP/ HF MD will review. See interrogation under cardiology tab in the [address] [place] [address] [address] field for more details. Will continue to monitor remotely.     (End of 91-day monitoring period 11/10/22)

## 2022-12-22 ENCOUNTER — NURSE ONLY (OUTPATIENT)
Dept: CARDIOLOGY CLINIC | Age: 82
End: 2022-12-22

## 2022-12-22 DIAGNOSIS — I25.5 ISCHEMIC CARDIOMYOPATHY: ICD-10-CM

## 2022-12-22 DIAGNOSIS — I50.22 CHRONIC SYSTOLIC CONGESTIVE HEART FAILURE, NYHA CLASS 3 (HCC): ICD-10-CM

## 2022-12-22 DIAGNOSIS — Z95.810 ICD (IMPLANTABLE CARDIOVERTER-DEFIBRILLATOR), BIVENTRICULAR, IN SITU: Primary | ICD-10-CM

## 2022-12-22 NOTE — PROGRESS NOTES
Remote transmission received from patient's CRT-D home monitor. Transmission shows normal sensing and pacing function. No new arrhythmias/ events recorded. AP 5.0%, .0%, Effective 99.9%, VSRp <0.1%    Optivol is within normal range  TriageHF Heart Failure Risk Status on 22-Dec-2022 is Medium    HF MD will review. See interrogation under cardiology tab in the [address] [place] [address] [address] field for more details. Will continue to monitor remotely. (End of 31-day monitoring period 12/22/22).

## 2023-01-23 ENCOUNTER — NURSE ONLY (OUTPATIENT)
Dept: CARDIOLOGY CLINIC | Age: 83
End: 2023-01-23
Payer: COMMERCIAL

## 2023-01-23 DIAGNOSIS — Z95.810 ICD (IMPLANTABLE CARDIOVERTER-DEFIBRILLATOR), BIVENTRICULAR, IN SITU: Primary | ICD-10-CM

## 2023-01-23 DIAGNOSIS — I25.5 ISCHEMIC CARDIOMYOPATHY: ICD-10-CM

## 2023-01-23 DIAGNOSIS — I50.22 CHRONIC SYSTOLIC HF (HEART FAILURE) (HCC): ICD-10-CM

## 2023-01-23 PROCEDURE — G2066 INTER DEVC REMOTE 30D: HCPCS | Performed by: CLINICAL NURSE SPECIALIST

## 2023-01-23 PROCEDURE — 93297 REM INTERROG DEV EVAL ICPMS: CPT | Performed by: CLINICAL NURSE SPECIALIST

## 2023-02-23 ENCOUNTER — NURSE ONLY (OUTPATIENT)
Dept: CARDIOLOGY CLINIC | Age: 83
End: 2023-02-23
Payer: COMMERCIAL

## 2023-02-23 DIAGNOSIS — Z95.810 ICD (IMPLANTABLE CARDIOVERTER-DEFIBRILLATOR), BIVENTRICULAR, IN SITU: Primary | ICD-10-CM

## 2023-02-23 DIAGNOSIS — I50.22 CHRONIC SYSTOLIC HF (HEART FAILURE) (HCC): ICD-10-CM

## 2023-02-23 DIAGNOSIS — I25.5 ISCHEMIC CARDIOMYOPATHY: ICD-10-CM

## 2023-02-23 PROCEDURE — 93297 REM INTERROG DEV EVAL ICPMS: CPT | Performed by: INTERNAL MEDICINE

## 2023-02-23 PROCEDURE — 93295 DEV INTERROG REMOTE 1/2/MLT: CPT | Performed by: INTERNAL MEDICINE

## 2023-02-23 PROCEDURE — 93296 REM INTERROG EVL PM/IDS: CPT | Performed by: INTERNAL MEDICINE

## 2023-02-23 NOTE — PROGRESS NOTES
Remote transmission received from patient's CRT-D home monitor. Transmission shows normal sensing and pacing function. No new arrhythmias/ events recorded. AP 23.3%, %, Effective 99.9%, VSRp <0.1%    Optivol is within normal range  TriageHF Heart Failure Risk Status on 23-Feb-2023 is Medium    EP/ HF MD will review. See interrogation under cardiology tab in the [address] [place] [address] [address] field for more details. Will continue to monitor remotely. (End of 91-day monitoring period 2/23/23).

## 2023-03-16 ENCOUNTER — TELEPHONE (OUTPATIENT)
Dept: CARDIOLOGY CLINIC | Age: 83
End: 2023-03-16

## 2023-03-16 NOTE — TELEPHONE ENCOUNTER
Tried to reach patient's son to find out if patient is back from [place]. Patient needs a appointment with [facility] for refills.

## 2023-03-30 ENCOUNTER — NURSE ONLY (OUTPATIENT)
Dept: CARDIOLOGY CLINIC | Age: 83
End: 2023-03-30
Payer: COMMERCIAL

## 2023-03-30 DIAGNOSIS — Z95.810 ICD (IMPLANTABLE CARDIOVERTER-DEFIBRILLATOR), BIVENTRICULAR, IN SITU: Primary | ICD-10-CM

## 2023-03-30 DIAGNOSIS — I50.22 CHRONIC SYSTOLIC HF (HEART FAILURE) (HCC): ICD-10-CM

## 2023-03-30 DIAGNOSIS — I25.5 ISCHEMIC CARDIOMYOPATHY: ICD-10-CM

## 2023-03-30 PROCEDURE — 93297 REM INTERROG DEV EVAL ICPMS: CPT | Performed by: INTERNAL MEDICINE

## 2023-03-30 PROCEDURE — G2066 INTER DEVC REMOTE 30D: HCPCS | Performed by: INTERNAL MEDICINE

## 2023-03-30 NOTE — PROGRESS NOTES
Remote transmission received from patient's CRT-D home monitor. Transmission shows normal sensing and pacing function. No new arrhythmias/ events recorded. AP 19.6%, %, Effective 100%, VSRp <0.1%    Optivol is within normal range  TriageHF Heart Failure Risk Status on 30-Mar-2023 is Medium    HF MD will review. See interrogation under cardiology tab in the [address] [place] [address] [address] field for more details. Will continue to monitor remotely. (End of 31-day monitoring period 3/30/23).

## 2023-05-04 ENCOUNTER — NURSE ONLY (OUTPATIENT)
Dept: CARDIOLOGY CLINIC | Age: 83
End: 2023-05-04

## 2023-05-04 DIAGNOSIS — I50.22 CHRONIC SYSTOLIC HF (HEART FAILURE) (HCC): ICD-10-CM

## 2023-05-04 DIAGNOSIS — Z95.810 ICD (IMPLANTABLE CARDIOVERTER-DEFIBRILLATOR), BIVENTRICULAR, IN SITU: Primary | ICD-10-CM

## 2023-05-04 DIAGNOSIS — I25.5 ISCHEMIC CARDIOMYOPATHY: ICD-10-CM

## 2023-05-04 NOTE — PROGRESS NOTES
Remote transmission received from patient's CRT-D home monitor. Transmission shows normal sensing and pacing function. No new arrhythmias/ events recorded. AP 27.7%, %, Effective 100%, VSRp <0.1%    Optivol is within normal range  TriageHF Heart Failure Risk Status on 04-May-2023 is Medium    HF MD will review. See interrogation under cardiology tab in the [address] [place] [address] [address] field for more details. Will continue to monitor remotely.     (End of 31-day monitoring period 5/4/23)

## 2023-08-25 RX ORDER — METOPROLOL SUCCINATE 100 MG/1
TABLET, EXTENDED RELEASE ORAL
Qty: 30 TABLET | Refills: 0 | OUTPATIENT
Start: 2023-08-25

## 2023-10-30 PROCEDURE — 93297 REM INTERROG DEV EVAL ICPMS: CPT | Performed by: CLINICAL NURSE SPECIALIST

## 2023-10-30 PROCEDURE — G2066 INTER DEVC REMOTE 30D: HCPCS | Performed by: CLINICAL NURSE SPECIALIST

## 2023-11-30 PROCEDURE — 93297 REM INTERROG DEV EVAL ICPMS: CPT | Performed by: CLINICAL NURSE SPECIALIST

## 2023-11-30 PROCEDURE — G2066 INTER DEVC REMOTE 30D: HCPCS | Performed by: CLINICAL NURSE SPECIALIST

## 2023-12-31 PROCEDURE — 93297 REM INTERROG DEV EVAL ICPMS: CPT | Performed by: NURSE PRACTITIONER

## 2023-12-31 PROCEDURE — G2066 INTER DEVC REMOTE 30D: HCPCS | Performed by: NURSE PRACTITIONER

## 2024-01-31 PROCEDURE — 93297 REM INTERROG DEV EVAL ICPMS: CPT | Performed by: CLINICAL NURSE SPECIALIST

## 2024-03-18 ENCOUNTER — TELEPHONE (OUTPATIENT)
Dept: CARDIOLOGY CLINIC | Age: 84
End: 2024-03-18

## 2024-03-18 NOTE — TELEPHONE ENCOUNTER
His optival is elevated over the past week  Please call and ask HF questions   Notes mentioning he was in [place]   thanks

## 2024-03-18 NOTE — TELEPHONE ENCOUNTER
Spoke to patient's son patient is in [place] and was seen by cardiologist they took him off his water pill and was seen by a new cardiologist and restarted the water pill. Patient's son kaitlin stated his dad is stuck in [place] he is not state side to ask chf questions.

## 2025-06-30 ENCOUNTER — TELEPHONE (OUTPATIENT)
Dept: CARDIOLOGY CLINIC | Age: 85
End: 2025-06-30

## 2025-06-30 NOTE — TELEPHONE ENCOUNTER
Optival elevated since the beginning of June  Please call and make an appt with me   His optival was elevated a year ago and he was in [place] at the time

## 2025-06-30 NOTE — TELEPHONE ENCOUNTER
Tried to reach patient's son Berto PRAKASH for him to call the office to make a follow up appointment with [facility] for his elevated optivol.